# Patient Record
Sex: FEMALE | Race: OTHER | Employment: FULL TIME | URBAN - METROPOLITAN AREA
[De-identification: names, ages, dates, MRNs, and addresses within clinical notes are randomized per-mention and may not be internally consistent; named-entity substitution may affect disease eponyms.]

---
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
## 2019-01-18 ENCOUNTER — ANESTHESIA (OUTPATIENT)
Dept: LABOR AND DELIVERY | Age: 19
DRG: 540 | End: 2019-01-18
Payer: MEDICAID

## 2019-01-18 ENCOUNTER — HOSPITAL ENCOUNTER (INPATIENT)
Age: 19
LOS: 3 days | Discharge: HOME | DRG: 540 | End: 2019-01-21
Attending: OBSTETRICS & GYNECOLOGY | Admitting: OBSTETRICS & GYNECOLOGY
Payer: MEDICAID

## 2019-01-18 ENCOUNTER — ROUTINE PRENATAL (OUTPATIENT)
Dept: OBGYN CLINIC | Age: 19
DRG: 540 | End: 2019-01-18
Payer: MEDICAID

## 2019-01-18 ENCOUNTER — ANESTHESIA EVENT (OUTPATIENT)
Dept: LABOR AND DELIVERY | Age: 19
DRG: 540 | End: 2019-01-18
Payer: MEDICAID

## 2019-01-18 VITALS
WEIGHT: 242 LBS | BODY MASS INDEX: 40.27 KG/M2 | DIASTOLIC BLOOD PRESSURE: 83 MMHG | SYSTOLIC BLOOD PRESSURE: 128 MMHG | HEART RATE: 109 BPM

## 2019-01-18 DIAGNOSIS — R10.9 ACUTE POSTOPERATIVE PAIN OF ABDOMEN: Primary | ICD-10-CM

## 2019-01-18 DIAGNOSIS — O99.213 OBESITY AFFECTING PREGNANCY IN THIRD TRIMESTER: ICD-10-CM

## 2019-01-18 DIAGNOSIS — O41.03X0 OLIGOHYDRAMNIOS IN THIRD TRIMESTER, SINGLE OR UNSPECIFIED FETUS: Primary | ICD-10-CM

## 2019-01-18 DIAGNOSIS — O98.811 CHLAMYDIA INFECTION AFFECTING PREGNANCY, ANTEPARTUM, FIRST TRIMESTER: ICD-10-CM

## 2019-01-18 DIAGNOSIS — O28.8 NON-REACTIVE NST (NON-STRESS TEST): ICD-10-CM

## 2019-01-18 DIAGNOSIS — G89.18 ACUTE POSTOPERATIVE PAIN OF ABDOMEN: Primary | ICD-10-CM

## 2019-01-18 DIAGNOSIS — A74.9 CHLAMYDIA INFECTION AFFECTING PREGNANCY, ANTEPARTUM, FIRST TRIMESTER: ICD-10-CM

## 2019-01-18 DIAGNOSIS — O99.013 ANEMIA AFFECTING PREGNANCY IN THIRD TRIMESTER: ICD-10-CM

## 2019-01-18 DIAGNOSIS — Z78.9 NON-ENGLISH SPEAKING PATIENT: ICD-10-CM

## 2019-01-18 DIAGNOSIS — O36.8390 VARIABLE FETAL HEART RATE DECELERATIONS, ANTEPARTUM: ICD-10-CM

## 2019-01-18 DIAGNOSIS — Z3A.39 39 WEEKS GESTATION OF PREGNANCY: ICD-10-CM

## 2019-01-18 LAB
ABO/RH: NORMAL
ANTIBODY SCREEN: NORMAL
GLUCOSE URINE, POC: NEGATIVE
HCT VFR BLD CALC: 36.3 % (ref 34–48)
HEMOGLOBIN: 12.5 G/DL (ref 11.5–15.5)
MCH RBC QN AUTO: 32.5 PG (ref 26–35)
MCHC RBC AUTO-ENTMCNC: 34.4 % (ref 32–34.5)
MCV RBC AUTO: 94.3 FL (ref 80–99.9)
PDW BLD-RTO: 13.2 FL (ref 11.5–15)
PLATELET # BLD: 259 E9/L (ref 130–450)
PMV BLD AUTO: 9.5 FL (ref 7–12)
PROTEIN UA: NEGATIVE
RBC # BLD: 3.85 E12/L (ref 3.5–5.5)
WBC # BLD: 12 E9/L (ref 4.5–11.5)

## 2019-01-18 PROCEDURE — 1220000001 HC SEMI PRIVATE L&D R&B

## 2019-01-18 PROCEDURE — 86850 RBC ANTIBODY SCREEN: CPT

## 2019-01-18 PROCEDURE — G8427 DOCREV CUR MEDS BY ELIG CLIN: HCPCS | Performed by: OBSTETRICS & GYNECOLOGY

## 2019-01-18 PROCEDURE — 6360000002 HC RX W HCPCS: Performed by: OBSTETRICS & GYNECOLOGY

## 2019-01-18 PROCEDURE — 99211 OFF/OP EST MAY X REQ PHY/QHP: CPT | Performed by: OBSTETRICS & GYNECOLOGY

## 2019-01-18 PROCEDURE — 6370000000 HC RX 637 (ALT 250 FOR IP): Performed by: OBSTETRICS & GYNECOLOGY

## 2019-01-18 PROCEDURE — 86901 BLOOD TYPING SEROLOGIC RH(D): CPT

## 2019-01-18 PROCEDURE — G8482 FLU IMMUNIZE ORDER/ADMIN: HCPCS | Performed by: OBSTETRICS & GYNECOLOGY

## 2019-01-18 PROCEDURE — G8417 CALC BMI ABV UP PARAM F/U: HCPCS | Performed by: OBSTETRICS & GYNECOLOGY

## 2019-01-18 PROCEDURE — 81002 URINALYSIS NONAUTO W/O SCOPE: CPT | Performed by: OBSTETRICS & GYNECOLOGY

## 2019-01-18 PROCEDURE — 1036F TOBACCO NON-USER: CPT | Performed by: OBSTETRICS & GYNECOLOGY

## 2019-01-18 PROCEDURE — 86900 BLOOD TYPING SEROLOGIC ABO: CPT

## 2019-01-18 PROCEDURE — 76820 UMBILICAL ARTERY ECHO: CPT | Performed by: OBSTETRICS & GYNECOLOGY

## 2019-01-18 PROCEDURE — 36415 COLL VENOUS BLD VENIPUNCTURE: CPT

## 2019-01-18 PROCEDURE — 76805 OB US >/= 14 WKS SNGL FETUS: CPT | Performed by: OBSTETRICS & GYNECOLOGY

## 2019-01-18 PROCEDURE — 99213 OFFICE O/P EST LOW 20 MIN: CPT | Performed by: OBSTETRICS & GYNECOLOGY

## 2019-01-18 PROCEDURE — 2580000003 HC RX 258: Performed by: OBSTETRICS & GYNECOLOGY

## 2019-01-18 PROCEDURE — 76818 FETAL BIOPHYS PROFILE W/NST: CPT | Performed by: OBSTETRICS & GYNECOLOGY

## 2019-01-18 PROCEDURE — 85027 COMPLETE CBC AUTOMATED: CPT

## 2019-01-18 RX ORDER — SODIUM CHLORIDE, SODIUM LACTATE, POTASSIUM CHLORIDE, CALCIUM CHLORIDE 600; 310; 30; 20 MG/100ML; MG/100ML; MG/100ML; MG/100ML
INJECTION, SOLUTION INTRAVENOUS CONTINUOUS
Status: DISCONTINUED | OUTPATIENT
Start: 2019-01-18 | End: 2019-01-19 | Stop reason: SDUPTHER

## 2019-01-18 RX ADMIN — DINOPROSTONE 10 MG: 10 INSERT VAGINAL at 15:25

## 2019-01-18 RX ADMIN — BUTORPHANOL TARTRATE 1 MG: 2 INJECTION, SOLUTION INTRAMUSCULAR; INTRAVENOUS at 20:03

## 2019-01-18 RX ADMIN — BUTORPHANOL TARTRATE 1 MG: 2 INJECTION, SOLUTION INTRAMUSCULAR; INTRAVENOUS at 23:10

## 2019-01-18 RX ADMIN — BUTORPHANOL TARTRATE 1 MG: 2 INJECTION, SOLUTION INTRAMUSCULAR; INTRAVENOUS at 20:02

## 2019-01-18 RX ADMIN — SODIUM CHLORIDE, POTASSIUM CHLORIDE, SODIUM LACTATE AND CALCIUM CHLORIDE: 600; 310; 30; 20 INJECTION, SOLUTION INTRAVENOUS at 15:00

## 2019-01-18 RX ADMIN — BUTORPHANOL TARTRATE 1 MG: 2 INJECTION, SOLUTION INTRAMUSCULAR; INTRAVENOUS at 23:11

## 2019-01-18 ASSESSMENT — PAIN SCALES - GENERAL
PAINLEVEL_OUTOF10: 10

## 2019-01-19 VITALS — SYSTOLIC BLOOD PRESSURE: 128 MMHG | OXYGEN SATURATION: 100 % | DIASTOLIC BLOOD PRESSURE: 71 MMHG

## 2019-01-19 PROCEDURE — 2500000003 HC RX 250 WO HCPCS: Performed by: NURSE ANESTHETIST, CERTIFIED REGISTERED

## 2019-01-19 PROCEDURE — 3609079900 HC CESAREAN SECTION: Performed by: OBSTETRICS & GYNECOLOGY

## 2019-01-19 PROCEDURE — 7100000001 HC PACU RECOVERY - ADDTL 15 MIN: Performed by: OBSTETRICS & GYNECOLOGY

## 2019-01-19 PROCEDURE — 6360000002 HC RX W HCPCS

## 2019-01-19 PROCEDURE — 1220000000 HC SEMI PRIVATE OB R&B

## 2019-01-19 PROCEDURE — 6360000002 HC RX W HCPCS: Performed by: NURSE ANESTHETIST, CERTIFIED REGISTERED

## 2019-01-19 PROCEDURE — C1765 ADHESION BARRIER: HCPCS | Performed by: OBSTETRICS & GYNECOLOGY

## 2019-01-19 PROCEDURE — 51701 INSERT BLADDER CATHETER: CPT

## 2019-01-19 PROCEDURE — 2580000003 HC RX 258: Performed by: NURSE ANESTHETIST, CERTIFIED REGISTERED

## 2019-01-19 PROCEDURE — 2500000003 HC RX 250 WO HCPCS: Performed by: ANESTHESIOLOGY

## 2019-01-19 PROCEDURE — 2580000003 HC RX 258: Performed by: OBSTETRICS & GYNECOLOGY

## 2019-01-19 PROCEDURE — 6370000000 HC RX 637 (ALT 250 FOR IP)

## 2019-01-19 PROCEDURE — 3700000025 ANESTHESIA EPIDURAL BLOCK: Performed by: ANESTHESIOLOGY

## 2019-01-19 PROCEDURE — 4A1HXCZ MONITORING OF PRODUCTS OF CONCEPTION, CARDIAC RATE, EXTERNAL APPROACH: ICD-10-PCS | Performed by: OBSTETRICS & GYNECOLOGY

## 2019-01-19 PROCEDURE — 2709999900 HC NON-CHARGEABLE SUPPLY: Performed by: OBSTETRICS & GYNECOLOGY

## 2019-01-19 PROCEDURE — 7100000000 HC PACU RECOVERY - FIRST 15 MIN: Performed by: OBSTETRICS & GYNECOLOGY

## 2019-01-19 PROCEDURE — 6360000002 HC RX W HCPCS: Performed by: ANESTHESIOLOGY

## 2019-01-19 PROCEDURE — 2780000010 HC IMPLANT OTHER: Performed by: OBSTETRICS & GYNECOLOGY

## 2019-01-19 PROCEDURE — 94761 N-INVAS EAR/PLS OXIMETRY MLT: CPT

## 2019-01-19 PROCEDURE — 2500000003 HC RX 250 WO HCPCS: Performed by: OBSTETRICS & GYNECOLOGY

## 2019-01-19 RX ORDER — ONDANSETRON 2 MG/ML
4 INJECTION INTRAMUSCULAR; INTRAVENOUS EVERY 6 HOURS PRN
Status: DISCONTINUED | OUTPATIENT
Start: 2019-01-19 | End: 2019-01-19

## 2019-01-19 RX ORDER — DIPHENHYDRAMINE HYDROCHLORIDE 50 MG/ML
25 INJECTION INTRAMUSCULAR; INTRAVENOUS EVERY 6 HOURS PRN
Status: DISCONTINUED | OUTPATIENT
Start: 2019-01-19 | End: 2019-01-21 | Stop reason: HOSPADM

## 2019-01-19 RX ORDER — NALOXONE HYDROCHLORIDE 0.4 MG/ML
0.4 INJECTION, SOLUTION INTRAMUSCULAR; INTRAVENOUS; SUBCUTANEOUS PRN
Status: DISCONTINUED | OUTPATIENT
Start: 2019-01-19 | End: 2019-01-19

## 2019-01-19 RX ORDER — METHYLERGONOVINE MALEATE 0.2 MG/ML
200 INJECTION INTRAVENOUS PRN
Status: DISCONTINUED | OUTPATIENT
Start: 2019-01-19 | End: 2019-01-21 | Stop reason: HOSPADM

## 2019-01-19 RX ORDER — KETOROLAC TROMETHAMINE 30 MG/ML
15 INJECTION, SOLUTION INTRAMUSCULAR; INTRAVENOUS EVERY 6 HOURS PRN
Status: DISPENSED | OUTPATIENT
Start: 2019-01-19 | End: 2019-01-20

## 2019-01-19 RX ORDER — BETAMETHASONE SODIUM PHOSPHATE AND BETAMETHASONE ACETATE 3; 3 MG/ML; MG/ML
INJECTION, SUSPENSION INTRA-ARTICULAR; INTRALESIONAL; INTRAMUSCULAR; SOFT TISSUE
Status: DISCONTINUED
Start: 2019-01-19 | End: 2019-01-19 | Stop reason: WASHOUT

## 2019-01-19 RX ORDER — ONDANSETRON 2 MG/ML
4 INJECTION INTRAMUSCULAR; INTRAVENOUS EVERY 6 HOURS PRN
Status: DISCONTINUED | OUTPATIENT
Start: 2019-01-19 | End: 2019-01-21 | Stop reason: HOSPADM

## 2019-01-19 RX ORDER — DOCUSATE SODIUM 100 MG/1
100 CAPSULE, LIQUID FILLED ORAL 2 TIMES DAILY
Status: DISCONTINUED | OUTPATIENT
Start: 2019-01-19 | End: 2019-01-21 | Stop reason: HOSPADM

## 2019-01-19 RX ORDER — LIDOCAINE HYDROCHLORIDE 10 MG/ML
INJECTION, SOLUTION INFILTRATION; PERINEURAL
Status: DISPENSED
Start: 2019-01-19 | End: 2019-01-19

## 2019-01-19 RX ORDER — NALBUPHINE HCL 10 MG/ML
5 AMPUL (ML) INJECTION EVERY 4 HOURS PRN
Status: DISCONTINUED | OUTPATIENT
Start: 2019-01-19 | End: 2019-01-19

## 2019-01-19 RX ORDER — TRISODIUM CITRATE DIHYDRATE AND CITRIC ACID MONOHYDRATE 500; 334 MG/5ML; MG/5ML
SOLUTION ORAL
Status: COMPLETED
Start: 2019-01-19 | End: 2019-01-19

## 2019-01-19 RX ORDER — ACETAMINOPHEN 325 MG/1
650 TABLET ORAL EVERY 4 HOURS PRN
Status: DISCONTINUED | OUTPATIENT
Start: 2019-01-19 | End: 2019-01-21 | Stop reason: HOSPADM

## 2019-01-19 RX ORDER — LIDOCAINE HYDROCHLORIDE AND EPINEPHRINE 20; 5 MG/ML; UG/ML
INJECTION, SOLUTION EPIDURAL; INFILTRATION; INTRACAUDAL; PERINEURAL PRN
Status: DISCONTINUED | OUTPATIENT
Start: 2019-01-19 | End: 2019-01-19 | Stop reason: SDUPTHER

## 2019-01-19 RX ORDER — IBUPROFEN 800 MG/1
800 TABLET ORAL EVERY 8 HOURS
Status: DISCONTINUED | OUTPATIENT
Start: 2019-01-20 | End: 2019-01-21 | Stop reason: HOSPADM

## 2019-01-19 RX ORDER — OXYCODONE HYDROCHLORIDE AND ACETAMINOPHEN 5; 325 MG/1; MG/1
2 TABLET ORAL EVERY 4 HOURS PRN
Status: DISCONTINUED | OUTPATIENT
Start: 2019-01-19 | End: 2019-01-21 | Stop reason: HOSPADM

## 2019-01-19 RX ORDER — SODIUM CHLORIDE, SODIUM LACTATE, POTASSIUM CHLORIDE, CALCIUM CHLORIDE 600; 310; 30; 20 MG/100ML; MG/100ML; MG/100ML; MG/100ML
INJECTION, SOLUTION INTRAVENOUS CONTINUOUS PRN
Status: DISCONTINUED | OUTPATIENT
Start: 2019-01-19 | End: 2019-01-19 | Stop reason: SDUPTHER

## 2019-01-19 RX ORDER — ACETAMINOPHEN 650 MG
TABLET, EXTENDED RELEASE ORAL
Status: DISPENSED
Start: 2019-01-19 | End: 2019-01-19

## 2019-01-19 RX ORDER — CLINDAMYCIN PHOSPHATE 900 MG/50ML
900 INJECTION INTRAVENOUS EVERY 8 HOURS
Status: DISCONTINUED | OUTPATIENT
Start: 2019-01-19 | End: 2019-01-20

## 2019-01-19 RX ORDER — SODIUM CHLORIDE, SODIUM LACTATE, POTASSIUM CHLORIDE, CALCIUM CHLORIDE 600; 310; 30; 20 MG/100ML; MG/100ML; MG/100ML; MG/100ML
INJECTION, SOLUTION INTRAVENOUS CONTINUOUS
Status: DISCONTINUED | OUTPATIENT
Start: 2019-01-19 | End: 2019-01-21 | Stop reason: HOSPADM

## 2019-01-19 RX ORDER — LANOLIN 100 %
OINTMENT (GRAM) TOPICAL
Status: DISCONTINUED | OUTPATIENT
Start: 2019-01-19 | End: 2019-01-21 | Stop reason: HOSPADM

## 2019-01-19 RX ORDER — SODIUM CHLORIDE 0.9 % (FLUSH) 0.9 %
10 SYRINGE (ML) INJECTION EVERY 12 HOURS SCHEDULED
Status: DISCONTINUED | OUTPATIENT
Start: 2019-01-19 | End: 2019-01-21 | Stop reason: HOSPADM

## 2019-01-19 RX ORDER — OXYCODONE HYDROCHLORIDE AND ACETAMINOPHEN 5; 325 MG/1; MG/1
1 TABLET ORAL EVERY 4 HOURS PRN
Status: DISPENSED | OUTPATIENT
Start: 2019-01-19 | End: 2019-01-20

## 2019-01-19 RX ORDER — FENTANYL CITRATE 50 UG/ML
INJECTION, SOLUTION INTRAMUSCULAR; INTRAVENOUS PRN
Status: DISCONTINUED | OUTPATIENT
Start: 2019-01-19 | End: 2019-01-19

## 2019-01-19 RX ORDER — METHYLERGONOVINE MALEATE 0.2 MG/ML
INJECTION INTRAVENOUS PRN
Status: DISCONTINUED | OUTPATIENT
Start: 2019-01-19 | End: 2019-01-19

## 2019-01-19 RX ORDER — SODIUM CHLORIDE 0.9 % (FLUSH) 0.9 %
10 SYRINGE (ML) INJECTION PRN
Status: DISCONTINUED | OUTPATIENT
Start: 2019-01-19 | End: 2019-01-19 | Stop reason: SDUPTHER

## 2019-01-19 RX ORDER — DIPHENHYDRAMINE HYDROCHLORIDE 50 MG/ML
25 INJECTION INTRAMUSCULAR; INTRAVENOUS EVERY 6 HOURS PRN
Status: ACTIVE | OUTPATIENT
Start: 2019-01-19 | End: 2019-01-20

## 2019-01-19 RX ORDER — CEFAZOLIN SODIUM 2 G/50ML
SOLUTION INTRAVENOUS
Status: COMPLETED
Start: 2019-01-19 | End: 2019-01-19

## 2019-01-19 RX ORDER — LIDOCAINE HYDROCHLORIDE 10 MG/ML
INJECTION, SOLUTION EPIDURAL; INFILTRATION; INTRACAUDAL; PERINEURAL
Status: DISPENSED
Start: 2019-01-19 | End: 2019-01-20

## 2019-01-19 RX ORDER — BUPIVACAINE HYDROCHLORIDE 7.5 MG/ML
INJECTION, SOLUTION INTRASPINAL
Status: DISPENSED
Start: 2019-01-19 | End: 2019-01-20

## 2019-01-19 RX ORDER — SODIUM CHLORIDE 0.9 % (FLUSH) 0.9 %
10 SYRINGE (ML) INJECTION EVERY 12 HOURS SCHEDULED
Status: DISCONTINUED | OUTPATIENT
Start: 2019-01-19 | End: 2019-01-19 | Stop reason: SDUPTHER

## 2019-01-19 RX ORDER — TRISODIUM CITRATE DIHYDRATE AND CITRIC ACID MONOHYDRATE 500; 334 MG/5ML; MG/5ML
30 SOLUTION ORAL ONCE
Status: COMPLETED | OUTPATIENT
Start: 2019-01-19 | End: 2019-01-19

## 2019-01-19 RX ORDER — PRENATAL WITH FERROUS FUM AND FOLIC ACID 3080; 920; 120; 400; 22; 1.84; 3; 20; 10; 1; 12; 200; 27; 25; 2 [IU]/1; [IU]/1; MG/1; [IU]/1; MG/1; MG/1; MG/1; MG/1; MG/1; MG/1; UG/1; MG/1; MG/1; MG/1; MG/1
1 TABLET ORAL DAILY
Status: DISCONTINUED | OUTPATIENT
Start: 2019-01-19 | End: 2019-01-21 | Stop reason: HOSPADM

## 2019-01-19 RX ORDER — OXYCODONE HYDROCHLORIDE AND ACETAMINOPHEN 5; 325 MG/1; MG/1
1 TABLET ORAL EVERY 4 HOURS PRN
Status: DISCONTINUED | OUTPATIENT
Start: 2019-01-19 | End: 2019-01-21 | Stop reason: HOSPADM

## 2019-01-19 RX ORDER — ONDANSETRON 2 MG/ML
INJECTION INTRAMUSCULAR; INTRAVENOUS PRN
Status: DISCONTINUED | OUTPATIENT
Start: 2019-01-19 | End: 2019-01-19

## 2019-01-19 RX ORDER — ONDANSETRON 2 MG/ML
4 INJECTION INTRAMUSCULAR; INTRAVENOUS EVERY 6 HOURS PRN
Status: ACTIVE | OUTPATIENT
Start: 2019-01-19 | End: 2019-01-20

## 2019-01-19 RX ORDER — MORPHINE SULFATE 10 MG/ML
INJECTION, SOLUTION INTRAMUSCULAR; INTRAVENOUS PRN
Status: DISCONTINUED | OUTPATIENT
Start: 2019-01-19 | End: 2019-01-19

## 2019-01-19 RX ORDER — SODIUM CHLORIDE, SODIUM LACTATE, POTASSIUM CHLORIDE, CALCIUM CHLORIDE 600; 310; 30; 20 MG/100ML; MG/100ML; MG/100ML; MG/100ML
INJECTION, SOLUTION INTRAVENOUS CONTINUOUS
Status: DISCONTINUED | OUTPATIENT
Start: 2019-01-19 | End: 2019-01-19 | Stop reason: SDUPTHER

## 2019-01-19 RX ADMIN — MORPHINE SULFATE 2 MG: 10 INJECTION INTRAVENOUS at 15:53

## 2019-01-19 RX ADMIN — Medication 5 ML: at 01:27

## 2019-01-19 RX ADMIN — SODIUM CHLORIDE, POTASSIUM CHLORIDE, SODIUM LACTATE AND CALCIUM CHLORIDE: 600; 310; 30; 20 INJECTION, SOLUTION INTRAVENOUS at 01:36

## 2019-01-19 RX ADMIN — SODIUM CHLORIDE, POTASSIUM CHLORIDE, SODIUM LACTATE AND CALCIUM CHLORIDE: 600; 310; 30; 20 INJECTION, SOLUTION INTRAVENOUS at 15:21

## 2019-01-19 RX ADMIN — SODIUM BICARBONATE 2 MEQ: 84 INJECTION, SOLUTION INTRAVENOUS at 15:19

## 2019-01-19 RX ADMIN — SODIUM CHLORIDE, POTASSIUM CHLORIDE, SODIUM LACTATE AND CALCIUM CHLORIDE: 600; 310; 30; 20 INJECTION, SOLUTION INTRAVENOUS at 14:30

## 2019-01-19 RX ADMIN — CLINDAMYCIN PHOSPHATE 900 MG: 900 INJECTION, SOLUTION INTRAVENOUS at 01:47

## 2019-01-19 RX ADMIN — FENTANYL CITRATE 100 MCG: 50 INJECTION, SOLUTION INTRAMUSCULAR; INTRAVENOUS at 15:16

## 2019-01-19 RX ADMIN — CEFAZOLIN SODIUM 2 G: 2 SOLUTION INTRAVENOUS at 14:50

## 2019-01-19 RX ADMIN — KETOROLAC TROMETHAMINE 15 MG: 30 INJECTION, SOLUTION INTRAMUSCULAR; INTRAVENOUS at 18:05

## 2019-01-19 RX ADMIN — SODIUM CHLORIDE, POTASSIUM CHLORIDE, SODIUM LACTATE AND CALCIUM CHLORIDE: 600; 310; 30; 20 INJECTION, SOLUTION INTRAVENOUS at 08:30

## 2019-01-19 RX ADMIN — Medication 15 ML/HR: at 01:39

## 2019-01-19 RX ADMIN — TRISODIUM CITRATE DIHYDRATE AND CITRIC ACID MONOHYDRATE 30 ML: 500; 334 SOLUTION ORAL at 14:49

## 2019-01-19 RX ADMIN — LIDOCAINE HYDROCHLORIDE,EPINEPHRINE BITARTRATE 10 ML: 20; .005 INJECTION, SOLUTION EPIDURAL; INFILTRATION; INTRACAUDAL; PERINEURAL at 15:17

## 2019-01-19 RX ADMIN — SODIUM CITRATE AND CITRIC ACID MONOHYDRATE 30 ML: 500; 334 SOLUTION ORAL at 14:49

## 2019-01-19 RX ADMIN — LIDOCAINE HYDROCHLORIDE,EPINEPHRINE BITARTRATE 10 ML: 20; .005 INJECTION, SOLUTION EPIDURAL; INFILTRATION; INTRACAUDAL; PERINEURAL at 15:25

## 2019-01-19 RX ADMIN — Medication 999 ML/HR: at 15:48

## 2019-01-19 RX ADMIN — ONDANSETRON HYDROCHLORIDE 4 MG: 2 INJECTION, SOLUTION INTRAMUSCULAR; INTRAVENOUS at 15:48

## 2019-01-19 RX ADMIN — Medication 1 MILLI-UNITS/MIN: at 12:45

## 2019-01-19 RX ADMIN — SODIUM CHLORIDE, POTASSIUM CHLORIDE, SODIUM LACTATE AND CALCIUM CHLORIDE: 600; 310; 30; 20 INJECTION, SOLUTION INTRAVENOUS at 03:50

## 2019-01-19 RX ADMIN — CLINDAMYCIN PHOSPHATE 900 MG: 900 INJECTION, SOLUTION INTRAVENOUS at 09:35

## 2019-01-19 RX ADMIN — METHYLERGONOVINE MALEATE 200 MCG: 0.2 INJECTION INTRAVENOUS at 16:09

## 2019-01-19 RX ADMIN — Medication 15 ML/HR: at 10:01

## 2019-01-19 RX ADMIN — Medication 2 ML: at 01:38

## 2019-01-19 ASSESSMENT — PULMONARY FUNCTION TESTS
PIF_VALUE: 0

## 2019-01-19 ASSESSMENT — PAIN SCALES - GENERAL: PAINLEVEL_OUTOF10: 9

## 2019-01-20 VITALS
BODY MASS INDEX: 40.32 KG/M2 | DIASTOLIC BLOOD PRESSURE: 86 MMHG | TEMPERATURE: 98.2 F | WEIGHT: 242 LBS | OXYGEN SATURATION: 98 % | HEART RATE: 120 BPM | RESPIRATION RATE: 18 BRPM | HEIGHT: 65 IN | SYSTOLIC BLOOD PRESSURE: 137 MMHG

## 2019-01-20 LAB
HCT VFR BLD CALC: 26.6 % (ref 34–48)
HEMOGLOBIN: 8.7 G/DL (ref 11.5–15.5)
MCH RBC QN AUTO: 31.4 PG (ref 26–35)
MCHC RBC AUTO-ENTMCNC: 32.7 % (ref 32–34.5)
MCV RBC AUTO: 96 FL (ref 80–99.9)
PDW BLD-RTO: 13.2 FL (ref 11.5–15)
PLATELET # BLD: 212 E9/L (ref 130–450)
PMV BLD AUTO: 9.3 FL (ref 7–12)
RBC # BLD: 2.77 E12/L (ref 3.5–5.5)
WBC # BLD: 12 E9/L (ref 4.5–11.5)

## 2019-01-20 PROCEDURE — 36415 COLL VENOUS BLD VENIPUNCTURE: CPT

## 2019-01-20 PROCEDURE — 6370000000 HC RX 637 (ALT 250 FOR IP): Performed by: OBSTETRICS & GYNECOLOGY

## 2019-01-20 PROCEDURE — 6360000002 HC RX W HCPCS: Performed by: ANESTHESIOLOGY

## 2019-01-20 PROCEDURE — 2580000003 HC RX 258: Performed by: OBSTETRICS & GYNECOLOGY

## 2019-01-20 PROCEDURE — 1220000000 HC SEMI PRIVATE OB R&B

## 2019-01-20 PROCEDURE — 85027 COMPLETE CBC AUTOMATED: CPT

## 2019-01-20 RX ADMIN — Medication 10 ML: at 08:35

## 2019-01-20 RX ADMIN — KETOROLAC TROMETHAMINE 15 MG: 30 INJECTION, SOLUTION INTRAMUSCULAR; INTRAVENOUS at 16:08

## 2019-01-20 RX ADMIN — IBUPROFEN 800 MG: 800 TABLET ORAL at 21:23

## 2019-01-20 RX ADMIN — DOCUSATE SODIUM 100 MG: 100 CAPSULE, LIQUID FILLED ORAL at 08:35

## 2019-01-20 RX ADMIN — OXYCODONE AND ACETAMINOPHEN 2 TABLET: 5; 325 TABLET ORAL at 20:27

## 2019-01-20 RX ADMIN — Medication 10 ML: at 16:08

## 2019-01-20 RX ADMIN — Medication: at 06:27

## 2019-01-20 RX ADMIN — Medication 1 TABLET: at 08:35

## 2019-01-20 RX ADMIN — KETOROLAC TROMETHAMINE 15 MG: 30 INJECTION, SOLUTION INTRAMUSCULAR; INTRAVENOUS at 08:35

## 2019-01-20 RX ADMIN — DOCUSATE SODIUM 100 MG: 100 CAPSULE, LIQUID FILLED ORAL at 21:23

## 2019-01-20 RX ADMIN — KETOROLAC TROMETHAMINE 15 MG: 30 INJECTION, SOLUTION INTRAMUSCULAR; INTRAVENOUS at 00:26

## 2019-01-20 RX ADMIN — SODIUM CHLORIDE, POTASSIUM CHLORIDE, SODIUM LACTATE AND CALCIUM CHLORIDE: 600; 310; 30; 20 INJECTION, SOLUTION INTRAVENOUS at 00:27

## 2019-01-20 RX ADMIN — OXYCODONE AND ACETAMINOPHEN 1 TABLET: 5; 325 TABLET ORAL at 06:27

## 2019-01-20 ASSESSMENT — PAIN SCALES - GENERAL
PAINLEVEL_OUTOF10: 10
PAINLEVEL_OUTOF10: 1
PAINLEVEL_OUTOF10: 5
PAINLEVEL_OUTOF10: 6
PAINLEVEL_OUTOF10: 4
PAINLEVEL_OUTOF10: 5

## 2019-01-21 PROCEDURE — 6370000000 HC RX 637 (ALT 250 FOR IP): Performed by: OBSTETRICS & GYNECOLOGY

## 2019-01-21 RX ORDER — PSEUDOEPHEDRINE HCL 30 MG
100 TABLET ORAL 2 TIMES DAILY
Qty: 60 CAPSULE | Refills: 3 | Status: SHIPPED | OUTPATIENT
Start: 2019-01-21 | End: 2021-10-29

## 2019-01-21 RX ORDER — OXYCODONE HYDROCHLORIDE AND ACETAMINOPHEN 5; 325 MG/1; MG/1
1 TABLET ORAL EVERY 6 HOURS PRN
Qty: 25 TABLET | Refills: 0 | Status: SHIPPED | OUTPATIENT
Start: 2019-01-21 | End: 2019-01-28

## 2019-01-21 RX ORDER — LANOLIN 100 %
OINTMENT (GRAM) TOPICAL
Qty: 1 TUBE | Refills: 3 | Status: SHIPPED | OUTPATIENT
Start: 2019-01-21 | End: 2022-06-15

## 2019-01-21 RX ORDER — FERROUS SULFATE 325(65) MG
325 TABLET ORAL 2 TIMES DAILY WITH MEALS
Qty: 60 TABLET | Refills: 3 | Status: SHIPPED | OUTPATIENT
Start: 2019-01-21 | End: 2021-10-29

## 2019-01-21 RX ORDER — IBUPROFEN 800 MG/1
800 TABLET ORAL EVERY 8 HOURS
Qty: 60 TABLET | Refills: 3 | Status: SHIPPED | OUTPATIENT
Start: 2019-01-21 | End: 2020-11-19 | Stop reason: ALTCHOICE

## 2019-01-21 RX ADMIN — OXYCODONE AND ACETAMINOPHEN 2 TABLET: 5; 325 TABLET ORAL at 14:38

## 2019-01-21 RX ADMIN — OXYCODONE AND ACETAMINOPHEN 2 TABLET: 5; 325 TABLET ORAL at 20:16

## 2019-01-21 RX ADMIN — DOCUSATE SODIUM 100 MG: 100 CAPSULE, LIQUID FILLED ORAL at 20:16

## 2019-01-21 RX ADMIN — Medication 1 TABLET: at 09:27

## 2019-01-21 RX ADMIN — OXYCODONE AND ACETAMINOPHEN 2 TABLET: 5; 325 TABLET ORAL at 01:58

## 2019-01-21 RX ADMIN — DOCUSATE SODIUM 100 MG: 100 CAPSULE, LIQUID FILLED ORAL at 09:26

## 2019-01-21 RX ADMIN — OXYCODONE AND ACETAMINOPHEN 2 TABLET: 5; 325 TABLET ORAL at 09:27

## 2019-01-21 RX ADMIN — IBUPROFEN 800 MG: 800 TABLET ORAL at 05:44

## 2019-01-21 RX ADMIN — IBUPROFEN 800 MG: 800 TABLET ORAL at 14:38

## 2019-01-21 ASSESSMENT — PAIN SCALES - GENERAL
PAINLEVEL_OUTOF10: 8
PAINLEVEL_OUTOF10: 8
PAINLEVEL_OUTOF10: 2
PAINLEVEL_OUTOF10: 8
PAINLEVEL_OUTOF10: 8

## 2019-01-21 ASSESSMENT — PAIN DESCRIPTION - RADICULAR PAIN: RADICULAR_PAIN: ABSENT

## 2019-02-13 ENCOUNTER — APPOINTMENT (OUTPATIENT)
Dept: GENERAL RADIOLOGY | Age: 19
End: 2019-02-13
Payer: MEDICAID

## 2019-02-13 ENCOUNTER — HOSPITAL ENCOUNTER (EMERGENCY)
Age: 19
Discharge: HOME | End: 2019-02-14
Attending: EMERGENCY MEDICINE
Payer: MEDICAID

## 2019-02-13 ENCOUNTER — APPOINTMENT (OUTPATIENT)
Dept: ULTRASOUND IMAGING | Age: 19
End: 2019-02-13
Payer: MEDICAID

## 2019-02-13 ENCOUNTER — APPOINTMENT (OUTPATIENT)
Dept: CT IMAGING | Age: 19
End: 2019-02-13
Payer: MEDICAID

## 2019-02-13 VITALS
RESPIRATION RATE: 16 BRPM | OXYGEN SATURATION: 97 % | DIASTOLIC BLOOD PRESSURE: 64 MMHG | BODY MASS INDEX: 36.65 KG/M2 | WEIGHT: 220 LBS | SYSTOLIC BLOOD PRESSURE: 110 MMHG | HEIGHT: 65 IN | TEMPERATURE: 98.4 F | HEART RATE: 71 BPM

## 2019-02-13 DIAGNOSIS — K66.8 PNEUMOPERITONEUM: Primary | ICD-10-CM

## 2019-02-13 DIAGNOSIS — G89.18 POST-OPERATIVE PAIN: ICD-10-CM

## 2019-02-13 LAB
ALBUMIN SERPL-MCNC: 4 G/DL (ref 3.5–5.2)
ALP BLD-CCNC: 93 U/L (ref 35–104)
ALT SERPL-CCNC: 40 U/L (ref 0–32)
ANION GAP SERPL CALCULATED.3IONS-SCNC: 9 MMOL/L (ref 7–16)
AST SERPL-CCNC: 47 U/L (ref 0–31)
BACTERIA: ABNORMAL /HPF
BASOPHILS ABSOLUTE: 0.04 E9/L (ref 0–0.2)
BASOPHILS RELATIVE PERCENT: 0.5 % (ref 0–2)
BILIRUB SERPL-MCNC: 0.2 MG/DL (ref 0–1.2)
BILIRUBIN URINE: NEGATIVE
BLOOD, URINE: NEGATIVE
BUN BLDV-MCNC: 12 MG/DL (ref 6–20)
CALCIUM SERPL-MCNC: 8.9 MG/DL (ref 8.6–10.2)
CHLORIDE BLD-SCNC: 102 MMOL/L (ref 98–107)
CLARITY: CLEAR
CO2: 28 MMOL/L (ref 22–29)
COLOR: YELLOW
CREAT SERPL-MCNC: 1 MG/DL (ref 0.5–1)
D DIMER: 429 NG/ML DDU
EOSINOPHILS ABSOLUTE: 0.23 E9/L (ref 0.05–0.5)
EOSINOPHILS RELATIVE PERCENT: 2.7 % (ref 0–6)
EPITHELIAL CELLS, UA: ABNORMAL /HPF
GFR AFRICAN AMERICAN: >60
GFR NON-AFRICAN AMERICAN: >60 ML/MIN/1.73
GLUCOSE BLD-MCNC: 90 MG/DL (ref 74–99)
GLUCOSE URINE: NEGATIVE MG/DL
HCG, URINE, POC: NEGATIVE
HCT VFR BLD CALC: 34.1 % (ref 34–48)
HEMOGLOBIN: 10.9 G/DL (ref 11.5–15.5)
IMMATURE GRANULOCYTES #: 0.05 E9/L
IMMATURE GRANULOCYTES %: 0.6 % (ref 0–5)
KETONES, URINE: NEGATIVE MG/DL
LACTIC ACID: 1.4 MMOL/L (ref 0.5–2.2)
LEUKOCYTE ESTERASE, URINE: ABNORMAL
LIPASE: 27 U/L (ref 13–60)
LYMPHOCYTES ABSOLUTE: 2.67 E9/L (ref 1.5–4)
LYMPHOCYTES RELATIVE PERCENT: 31.2 % (ref 20–42)
Lab: NORMAL
MCH RBC QN AUTO: 30.5 PG (ref 26–35)
MCHC RBC AUTO-ENTMCNC: 32 % (ref 32–34.5)
MCV RBC AUTO: 95.5 FL (ref 80–99.9)
MONOCYTES ABSOLUTE: 0.49 E9/L (ref 0.1–0.95)
MONOCYTES RELATIVE PERCENT: 5.7 % (ref 2–12)
NEGATIVE QC PASS/FAIL: NORMAL
NEUTROPHILS ABSOLUTE: 5.07 E9/L (ref 1.8–7.3)
NEUTROPHILS RELATIVE PERCENT: 59.3 % (ref 43–80)
NITRITE, URINE: NEGATIVE
PDW BLD-RTO: 12.5 FL (ref 11.5–15)
PH UA: 6 (ref 5–9)
PLATELET # BLD: 274 E9/L (ref 130–450)
PMV BLD AUTO: 8.8 FL (ref 7–12)
POSITIVE QC PASS/FAIL: NORMAL
POTASSIUM REFLEX MAGNESIUM: 3.9 MMOL/L (ref 3.5–5)
PROTEIN UA: NEGATIVE MG/DL
RBC # BLD: 3.57 E12/L (ref 3.5–5.5)
RBC UA: ABNORMAL /HPF (ref 0–2)
SODIUM BLD-SCNC: 139 MMOL/L (ref 132–146)
SPECIFIC GRAVITY UA: 1.02 (ref 1–1.03)
TOTAL PROTEIN: 6.7 G/DL (ref 6.4–8.3)
UROBILINOGEN, URINE: 0.2 E.U./DL
WBC # BLD: 8.6 E9/L (ref 4.5–11.5)
WBC UA: ABNORMAL /HPF (ref 0–5)

## 2019-02-13 PROCEDURE — 83690 ASSAY OF LIPASE: CPT

## 2019-02-13 PROCEDURE — 81001 URINALYSIS AUTO W/SCOPE: CPT

## 2019-02-13 PROCEDURE — 74176 CT ABD & PELVIS W/O CONTRAST: CPT

## 2019-02-13 PROCEDURE — 76705 ECHO EXAM OF ABDOMEN: CPT

## 2019-02-13 PROCEDURE — 2500000003 HC RX 250 WO HCPCS: Performed by: EMERGENCY MEDICINE

## 2019-02-13 PROCEDURE — 85378 FIBRIN DEGRADE SEMIQUANT: CPT

## 2019-02-13 PROCEDURE — 80053 COMPREHEN METABOLIC PANEL: CPT

## 2019-02-13 PROCEDURE — 74177 CT ABD & PELVIS W/CONTRAST: CPT

## 2019-02-13 PROCEDURE — 99284 EMERGENCY DEPT VISIT MOD MDM: CPT

## 2019-02-13 PROCEDURE — 85025 COMPLETE CBC W/AUTO DIFF WBC: CPT

## 2019-02-13 PROCEDURE — 6360000004 HC RX CONTRAST MEDICATION: Performed by: RADIOLOGY

## 2019-02-13 PROCEDURE — 96374 THER/PROPH/DIAG INJ IV PUSH: CPT

## 2019-02-13 PROCEDURE — 71045 X-RAY EXAM CHEST 1 VIEW: CPT

## 2019-02-13 PROCEDURE — 83605 ASSAY OF LACTIC ACID: CPT

## 2019-02-13 PROCEDURE — 36415 COLL VENOUS BLD VENIPUNCTURE: CPT

## 2019-02-13 PROCEDURE — 71275 CT ANGIOGRAPHY CHEST: CPT

## 2019-02-13 PROCEDURE — 6370000000 HC RX 637 (ALT 250 FOR IP): Performed by: EMERGENCY MEDICINE

## 2019-02-13 RX ADMIN — FAMOTIDINE 20 MG: 10 INJECTION, SOLUTION INTRAVENOUS at 20:20

## 2019-02-13 RX ADMIN — LIDOCAINE HYDROCHLORIDE: 20 SOLUTION ORAL; TOPICAL at 20:19

## 2019-02-13 RX ADMIN — IOPAMIDOL 110 ML: 755 INJECTION, SOLUTION INTRAVENOUS at 21:36

## 2019-02-13 ASSESSMENT — ENCOUNTER SYMPTOMS
RHINORRHEA: 0
ABDOMINAL PAIN: 1
COUGH: 0
BACK PAIN: 0
COLOR CHANGE: 0
SHORTNESS OF BREATH: 0
VOMITING: 0
DIARRHEA: 0
CONSTIPATION: 0
BLOOD IN STOOL: 0
NAUSEA: 0
SORE THROAT: 0

## 2019-02-13 ASSESSMENT — PAIN DESCRIPTION - ORIENTATION: ORIENTATION: LOWER

## 2019-02-13 ASSESSMENT — PAIN DESCRIPTION - LOCATION: LOCATION: ABDOMEN;BACK

## 2019-02-13 ASSESSMENT — PAIN DESCRIPTION - FREQUENCY: FREQUENCY: CONTINUOUS

## 2019-02-13 ASSESSMENT — PAIN SCALES - GENERAL: PAINLEVEL_OUTOF10: 5

## 2019-02-13 ASSESSMENT — PAIN DESCRIPTION - PAIN TYPE: TYPE: ACUTE PAIN

## 2019-02-14 PROCEDURE — 6360000004 HC RX CONTRAST MEDICATION: Performed by: RADIOLOGY

## 2019-02-14 RX ADMIN — IOHEXOL 50 ML: 240 INJECTION, SOLUTION INTRATHECAL; INTRAVASCULAR; INTRAVENOUS; ORAL at 01:34

## 2019-02-20 LAB
EKG ATRIAL RATE: 67 BPM
EKG P AXIS: 23 DEGREES
EKG P-R INTERVAL: 170 MS
EKG Q-T INTERVAL: 410 MS
EKG QRS DURATION: 84 MS
EKG QTC CALCULATION (BAZETT): 433 MS
EKG R AXIS: 24 DEGREES
EKG T AXIS: 26 DEGREES
EKG VENTRICULAR RATE: 67 BPM

## 2019-05-07 ENCOUNTER — HOSPITAL ENCOUNTER (EMERGENCY)
Age: 19
Discharge: HOME | End: 2019-05-07
Attending: EMERGENCY MEDICINE
Payer: MEDICAID

## 2019-05-07 ENCOUNTER — APPOINTMENT (OUTPATIENT)
Dept: CT IMAGING | Age: 19
End: 2019-05-07
Payer: MEDICAID

## 2019-05-07 ENCOUNTER — APPOINTMENT (OUTPATIENT)
Dept: ULTRASOUND IMAGING | Age: 19
End: 2019-05-07
Payer: MEDICAID

## 2019-05-07 VITALS
OXYGEN SATURATION: 99 % | TEMPERATURE: 98.2 F | HEIGHT: 65 IN | SYSTOLIC BLOOD PRESSURE: 119 MMHG | BODY MASS INDEX: 36.65 KG/M2 | RESPIRATION RATE: 16 BRPM | HEART RATE: 84 BPM | DIASTOLIC BLOOD PRESSURE: 63 MMHG | WEIGHT: 220 LBS

## 2019-05-07 DIAGNOSIS — R11.2 NON-INTRACTABLE VOMITING WITH NAUSEA, UNSPECIFIED VOMITING TYPE: ICD-10-CM

## 2019-05-07 DIAGNOSIS — R10.84 GENERALIZED ABDOMINAL PAIN: Primary | ICD-10-CM

## 2019-05-07 LAB
ALBUMIN SERPL-MCNC: 4.3 G/DL (ref 3.5–5.2)
ALP BLD-CCNC: 88 U/L (ref 35–104)
ALT SERPL-CCNC: 20 U/L (ref 0–32)
ANION GAP SERPL CALCULATED.3IONS-SCNC: 11 MMOL/L (ref 7–16)
AST SERPL-CCNC: 17 U/L (ref 0–31)
BACTERIA: NORMAL /HPF
BASOPHILS ABSOLUTE: 0.04 E9/L (ref 0–0.2)
BASOPHILS RELATIVE PERCENT: 0.5 % (ref 0–2)
BILIRUB SERPL-MCNC: <0.2 MG/DL (ref 0–1.2)
BILIRUBIN URINE: NEGATIVE
BLOOD, URINE: NEGATIVE
BUN BLDV-MCNC: 10 MG/DL (ref 6–20)
CALCIUM SERPL-MCNC: 8.7 MG/DL (ref 8.6–10.2)
CHLORIDE BLD-SCNC: 100 MMOL/L (ref 98–107)
CLARITY: CLEAR
CO2: 26 MMOL/L (ref 22–29)
COLOR: YELLOW
CREAT SERPL-MCNC: 0.7 MG/DL (ref 0.5–1)
EOSINOPHILS ABSOLUTE: 0.16 E9/L (ref 0.05–0.5)
EOSINOPHILS RELATIVE PERCENT: 2.1 % (ref 0–6)
GFR AFRICAN AMERICAN: >60
GFR NON-AFRICAN AMERICAN: >60 ML/MIN/1.73
GLUCOSE BLD-MCNC: 95 MG/DL (ref 74–99)
GLUCOSE URINE: NEGATIVE MG/DL
HCG(URINE) PREGNANCY TEST: NEGATIVE
HCT VFR BLD CALC: 36.9 % (ref 34–48)
HEMOGLOBIN: 12 G/DL (ref 11.5–15.5)
IMMATURE GRANULOCYTES #: 0.04 E9/L
IMMATURE GRANULOCYTES %: 0.5 % (ref 0–5)
INFLUENZA A BY PCR: NOT DETECTED
INFLUENZA B BY PCR: NOT DETECTED
KETONES, URINE: NEGATIVE MG/DL
LACTIC ACID: 1.6 MMOL/L (ref 0.5–2.2)
LEUKOCYTE ESTERASE, URINE: NEGATIVE
LIPASE: 29 U/L (ref 13–60)
LYMPHOCYTES ABSOLUTE: 2.08 E9/L (ref 1.5–4)
LYMPHOCYTES RELATIVE PERCENT: 26.9 % (ref 20–42)
MCH RBC QN AUTO: 29.6 PG (ref 26–35)
MCHC RBC AUTO-ENTMCNC: 32.5 % (ref 32–34.5)
MCV RBC AUTO: 91.1 FL (ref 80–99.9)
MONOCYTES ABSOLUTE: 0.63 E9/L (ref 0.1–0.95)
MONOCYTES RELATIVE PERCENT: 8.1 % (ref 2–12)
NEUTROPHILS ABSOLUTE: 4.79 E9/L (ref 1.8–7.3)
NEUTROPHILS RELATIVE PERCENT: 61.9 % (ref 43–80)
NITRITE, URINE: NEGATIVE
PDW BLD-RTO: 13.1 FL (ref 11.5–15)
PH UA: 6 (ref 5–9)
PLATELET # BLD: 327 E9/L (ref 130–450)
PMV BLD AUTO: 8.9 FL (ref 7–12)
POTASSIUM SERPL-SCNC: 3.7 MMOL/L (ref 3.5–5)
PROTEIN UA: NEGATIVE MG/DL
RBC # BLD: 4.05 E12/L (ref 3.5–5.5)
RBC UA: NORMAL /HPF (ref 0–2)
SODIUM BLD-SCNC: 137 MMOL/L (ref 132–146)
SPECIFIC GRAVITY UA: 1.02 (ref 1–1.03)
TOTAL PROTEIN: 7.4 G/DL (ref 6.4–8.3)
UROBILINOGEN, URINE: 0.2 E.U./DL
WBC # BLD: 7.7 E9/L (ref 4.5–11.5)
WBC UA: NORMAL /HPF (ref 0–5)

## 2019-05-07 PROCEDURE — 81001 URINALYSIS AUTO W/SCOPE: CPT

## 2019-05-07 PROCEDURE — 81025 URINE PREGNANCY TEST: CPT

## 2019-05-07 PROCEDURE — 74177 CT ABD & PELVIS W/CONTRAST: CPT

## 2019-05-07 PROCEDURE — 83605 ASSAY OF LACTIC ACID: CPT

## 2019-05-07 PROCEDURE — 36415 COLL VENOUS BLD VENIPUNCTURE: CPT

## 2019-05-07 PROCEDURE — 87502 INFLUENZA DNA AMP PROBE: CPT

## 2019-05-07 PROCEDURE — 83690 ASSAY OF LIPASE: CPT

## 2019-05-07 PROCEDURE — C9113 INJ PANTOPRAZOLE SODIUM, VIA: HCPCS | Performed by: PHYSICIAN ASSISTANT

## 2019-05-07 PROCEDURE — 2580000003 HC RX 258: Performed by: PHYSICIAN ASSISTANT

## 2019-05-07 PROCEDURE — 99284 EMERGENCY DEPT VISIT MOD MDM: CPT

## 2019-05-07 PROCEDURE — 96374 THER/PROPH/DIAG INJ IV PUSH: CPT

## 2019-05-07 PROCEDURE — 6360000004 HC RX CONTRAST MEDICATION: Performed by: RADIOLOGY

## 2019-05-07 PROCEDURE — 80053 COMPREHEN METABOLIC PANEL: CPT

## 2019-05-07 PROCEDURE — 6360000002 HC RX W HCPCS: Performed by: PHYSICIAN ASSISTANT

## 2019-05-07 PROCEDURE — 85025 COMPLETE CBC W/AUTO DIFF WBC: CPT

## 2019-05-07 RX ORDER — FAMOTIDINE 20 MG/1
20 TABLET, FILM COATED ORAL 2 TIMES DAILY
Qty: 14 TABLET | Refills: 0 | Status: SHIPPED | OUTPATIENT
Start: 2019-05-07 | End: 2021-10-30 | Stop reason: SDUPTHER

## 2019-05-07 RX ORDER — PANTOPRAZOLE SODIUM 40 MG/10ML
40 INJECTION, POWDER, LYOPHILIZED, FOR SOLUTION INTRAVENOUS ONCE
Status: COMPLETED | OUTPATIENT
Start: 2019-05-07 | End: 2019-05-07

## 2019-05-07 RX ORDER — 0.9 % SODIUM CHLORIDE 0.9 %
1000 INTRAVENOUS SOLUTION INTRAVENOUS ONCE
Status: COMPLETED | OUTPATIENT
Start: 2019-05-07 | End: 2019-05-07

## 2019-05-07 RX ORDER — PANTOPRAZOLE SODIUM 20 MG/1
20 TABLET, DELAYED RELEASE ORAL DAILY
Qty: 30 TABLET | Refills: 0 | Status: SHIPPED | OUTPATIENT
Start: 2019-05-07 | End: 2021-10-29

## 2019-05-07 RX ADMIN — SODIUM CHLORIDE 1000 ML: 9 INJECTION, SOLUTION INTRAVENOUS at 15:01

## 2019-05-07 RX ADMIN — PANTOPRAZOLE SODIUM 40 MG: 40 INJECTION, POWDER, FOR SOLUTION INTRAVENOUS at 15:00

## 2019-05-07 RX ADMIN — IOPAMIDOL 110 ML: 755 INJECTION, SOLUTION INTRAVENOUS at 14:05

## 2019-05-07 ASSESSMENT — PAIN SCALES - GENERAL: PAINLEVEL_OUTOF10: 10

## 2019-05-07 ASSESSMENT — PAIN DESCRIPTION - LOCATION: LOCATION: ABDOMEN

## 2019-05-07 ASSESSMENT — PAIN DESCRIPTION - ORIENTATION: ORIENTATION: UPPER

## 2019-05-07 ASSESSMENT — PAIN DESCRIPTION - PAIN TYPE: TYPE: ACUTE PAIN

## 2019-05-07 NOTE — ED PROVIDER NOTES
ED Attending  CC: Emily      Department of Emergency Medicine   ED  Provider Note  Admit Date/RoomTime: 2019 12:41 PM  ED Room:   MRN: [id number]  Chief Complaint       Abdominal Pain (since yesterday morning, upper abd constant ); Generalized Body Aches; and Emesis (yesterday x 2, today x 1)    History of Present Illness   Source of history provided by:  patient. History/Exam Limitations: pt Israeli speaking and translation services utilized; patient is a vague historian. Dexter Boogie is a 20 y.o. old female who has a past medical history of:   Past Medical History:   Diagnosis Date    Asthma     presents to the emergency department by private vehicle, for complaints of gradual onset, constant aching pain in the epigastrium without radiation which began 1 day(s) prior to arrival.   There has been no similar episodes in the past. She states she has vomited twice yesterday and once today. Denies any diarrhea or fevers. Denies any chest pain, shortness of breath, nasal congestion, cough. She states she has generalized myalgias as well. She states eating seems to make the pain worse. She did not eat or drink much yesterday but today she drank soda. ROS   Pertinent positives and negatives are stated within HPI, all other systems reviewed and are negative. Past Surgical History:   Procedure Laterality Date     SECTION N/A 2019     SECTION performed by J[facility]ad Cain MD at [facility] L[facility]    TONSILLECTOMY     Social History:  reports that she has never smoked. She has never used smokeless tobacco. She reports that she does not drink alcohol or use drugs. Family History: family history is not on file.    Allergies: Penicillins    Physical Exam           ED Triage Vitals   BP Temp Temp Source Pulse Resp SpO2 Height Weight   19 1230 19 1230 19 1230 19 1230 19 1230 19 1230 19 1231 19 1231   120/89 98.2 °F (36.8 °C) Oral 94 20 96 % 5' 5\" (1.651 m) 220 lb (99.8 kg)      Oxygen Saturation Interpretation: Normal.    · General Appearance/Constitutional:  Alert, development consistent with age. · HEENT:  NC/NT. PERRLA. Airway patent. · Neck:  Supple. No lymphadenopathy. · Respiratory:  No retractions. Lungs Clear to auscultation and breath sounds equal.  · CV:  Regular rate and rhythm. · GI:  General Appearance: normal.         Bowel sounds: hypoactive bowel sounds. Distension:  None. Tenderness: mild tenderness is present in the epigastrium and in the RUQ. Liver: non-tender. Spleen:  non-tender. Pulsatile Mass: absent. · Back: CVA Tenderness: No.  · : (chaperone present during examination). Not indicated/deferred  · Integument:  Normal turgor. Warm, dry, without visible rash, unless noted elsewhere. · Lymphatics: No edema, cap.refill <3sec. · Neurological:  Orientation age-appropriate. Motor functions intact.     Lab / Imaging Results   (All laboratory and radiology results have been personally reviewed by myself)  Labs:  Results for orders placed or performed during the hospital encounter of 05/07/19   Rapid influenza A/B antigens   Result Value Ref Range    Influenza A by PCR Not Detected Not Detected    Influenza B by PCR Not Detected Not Detected   CBC Auto Differential   Result Value Ref Range    WBC 7.7 4.5 - 11.5 E9/L    RBC 4.05 3.50 - 5.50 E12/L    Hemoglobin 12.0 11.5 - 15.5 g/dL    Hematocrit 36.9 34.0 - 48.0 %    MCV 91.1 80.0 - 99.9 fL    MCH 29.6 26.0 - 35.0 pg    MCHC 32.5 32.0 - 34.5 %    RDW 13.1 11.5 - 15.0 fL    Platelets 564 412 - 687 E9/L    MPV 8.9 7.0 - 12.0 fL    Neutrophils % 61.9 43.0 - 80.0 %    Immature Granulocytes % 0.5 0.0 - 5.0 %    Lymphocytes % 26.9 20.0 - 42.0 %    Monocytes % 8.1 2.0 - 12.0 %    Eosinophils % 2.1 0.0 - 6.0 %    Basophils % 0.5 0.0 - 2.0 %    Neutrophils # 4.79 1.80 - 7.30 E9/L    Immature Granulocytes # 0.04 E9/L Lymphocytes # 2.08 1.50 - 4.00 E9/L    Monocytes # 0.63 0.10 - 0.95 E9/L    Eosinophils # 0.16 0.05 - 0.50 E9/L    Basophils # 0.04 0.00 - 0.20 E9/L   Comprehensive Metabolic Panel   Result Value Ref Range    Sodium 137 132 - 146 mmol/L    Potassium 3.7 3.5 - 5.0 mmol/L    Chloride 100 98 - 107 mmol/L    CO2 26 22 - 29 mmol/L    Anion Gap 11 7 - 16 mmol/L    Glucose 95 74 - 99 mg/dL    BUN 10 6 - 20 mg/dL    CREATININE 0.7 0.5 - 1.0 mg/dL    GFR Non-African American >60 >=60 mL/min/1.73    GFR African American >60     Calcium 8.7 8.6 - 10.2 mg/dL    Total Protein 7.4 6.4 - 8.3 g/dL    Alb 4.3 3.5 - 5.2 g/dL    Total Bilirubin <0.2 0.0 - 1.2 mg/dL    Alkaline Phosphatase 88 35 - 104 U/L    ALT 20 0 - 32 U/L    AST 17 0 - 31 U/L   Lipase   Result Value Ref Range    Lipase 29 13 - 60 U/L   Lactic Acid, Plasma   Result Value Ref Range    Lactic Acid 1.6 0.5 - 2.2 mmol/L   Urinalysis with Microscopic   Result Value Ref Range    Color, UA Yellow Straw/Yellow    Clarity, UA Clear Clear    Glucose, Ur Negative Negative mg/dL    Bilirubin Urine Negative Negative    Ketones, Urine Negative Negative mg/dL    Specific Gravity, UA 1.025 1.005 - 1.030    Blood, Urine Negative Negative    pH, UA 6.0 5.0 - 9.0    Protein, UA Negative Negative mg/dL    Urobilinogen, Urine 0.2 <2.0 E.U./dL    Nitrite, Urine Negative Negative    Leukocyte Esterase, Urine Negative Negative    WBC, UA NONE 0 - 5 /HPF    RBC, UA NONE 0 - 2 /HPF    Bacteria, UA NONE /HPF   Pregnancy, urine   Result Value Ref Range    HCG(Urine) Pregnancy Test NEGATIVE NEGATIVE     Imaging: All Radiology results interpreted by Radiologist unless otherwise noted. CT ABDOMEN PELVIS W IV CONTRAST Additional Contrast? None   Final Result   Small amount of free fluid within the pelvis, possibly physiologic in   nature.                        ED Course / Medical Decision Making     Medications   0.9 % sodium chloride bolus (0 mLs Intravenous Stopped 5/7/19 4067)   pantoprazole (PROTONIX) injection 40 mg (40 mg Intravenous Given 5/7/19 1500)   iopamidol (ISOVUE-370) 76 % injection 110 mL (110 mLs Intravenous Given 5/7/19 1405)        Re-examination:  5/7/19       Time: 5360    Patients symptoms are improving. Consults:   None    Procedures:   none    MDM:   Patient presenting for upper abdominal pain and vomiting that occurred several times in the past couple days. Upon examination, the patient had an mild tenderness in the epigastrium and right upper quadrant. CT showing no abnormalities besides some free fluid which likely is physiologic. Lab workup grossly benign. Translation services were utilized and she understood the workup and medications that she will go home with. Counseling: The emergency provider has spoken with the patient and discussed todays results, in addition to providing specific details for the plan of care and counseling regarding the diagnosis and prognosis. Questions are answered at this time and they are agreeable with the plan . Assessment      1. Generalized abdominal pain    2. Non-intractable vomiting with nausea, unspecified vomiting type      Plan   Discharge to home  Patient condition is stable    New Medications     Discharge Medication List as of 5/7/2019  3:35 PM      START taking these medications    Details   pantoprazole (PROTONIX) 20 MG tablet Take 1 tablet by mouth daily, Disp-30 tablet, R-0Print      famotidine (PEPCID) 20 MG tablet Take 1 tablet by mouth 2 times daily for 7 days, Disp-14 tablet, R-0Print           Electronically signed by Vandana Patterson PA-C   DD: 5/7/19  **This report was transcribed using voice recognition software. Every effort was made to ensure accuracy; however, inadvertent computerized transcription errors may be present.   END OF ED PROVIDER NOTE        Alison Scott PA-C  05/08/19 0785

## 2019-05-07 NOTE — ED NOTES
Triage completed using intepreter ipad.  ipad placed at bedside for provider use.      Libra Blackman RN  05/07/19 [id number]

## 2019-06-13 ENCOUNTER — APPOINTMENT (OUTPATIENT)
Dept: ULTRASOUND IMAGING | Age: 19
End: 2019-06-13
Payer: MEDICAID

## 2019-06-13 ENCOUNTER — HOSPITAL ENCOUNTER (EMERGENCY)
Age: 19
Discharge: HOME | End: 2019-06-13
Payer: MEDICAID

## 2019-06-13 VITALS
HEIGHT: 65 IN | BODY MASS INDEX: 36.65 KG/M2 | SYSTOLIC BLOOD PRESSURE: 132 MMHG | RESPIRATION RATE: 16 BRPM | HEART RATE: 94 BPM | OXYGEN SATURATION: 98 % | DIASTOLIC BLOOD PRESSURE: 68 MMHG | TEMPERATURE: 98.3 F | WEIGHT: 220 LBS

## 2019-06-13 DIAGNOSIS — N94.6 DYSMENORRHEA: ICD-10-CM

## 2019-06-13 DIAGNOSIS — R10.2 PELVIC CRAMPING: Primary | ICD-10-CM

## 2019-06-13 LAB
ALBUMIN SERPL-MCNC: 4.4 G/DL (ref 3.5–5.2)
ALP BLD-CCNC: 86 U/L (ref 35–104)
ALT SERPL-CCNC: 21 U/L (ref 0–32)
ANION GAP SERPL CALCULATED.3IONS-SCNC: 11 MMOL/L (ref 7–16)
AST SERPL-CCNC: 16 U/L (ref 0–31)
BACTERIA: NORMAL /HPF
BASOPHILS ABSOLUTE: 0.04 E9/L (ref 0–0.2)
BASOPHILS RELATIVE PERCENT: 0.4 % (ref 0–2)
BILIRUB SERPL-MCNC: 0.2 MG/DL (ref 0–1.2)
BILIRUBIN URINE: NEGATIVE
BLOOD, URINE: ABNORMAL
BUN BLDV-MCNC: 10 MG/DL (ref 6–20)
CALCIUM SERPL-MCNC: 9.5 MG/DL (ref 8.6–10.2)
CHLORIDE BLD-SCNC: 105 MMOL/L (ref 98–107)
CLARITY: CLEAR
CLUE CELLS: NORMAL
CO2: 24 MMOL/L (ref 22–29)
COLOR: YELLOW
CREAT SERPL-MCNC: 0.7 MG/DL (ref 0.5–1)
EOSINOPHILS ABSOLUTE: 0.12 E9/L (ref 0.05–0.5)
EOSINOPHILS RELATIVE PERCENT: 1.2 % (ref 0–6)
EPITHELIAL CELLS, UA: NORMAL /HPF
GFR AFRICAN AMERICAN: >60
GFR NON-AFRICAN AMERICAN: >60 ML/MIN/1.73
GLUCOSE BLD-MCNC: 106 MG/DL (ref 74–99)
GLUCOSE URINE: NEGATIVE MG/DL
HCG, URINE, POC: NEGATIVE
HCT VFR BLD CALC: 37.3 % (ref 34–48)
HEMOGLOBIN: 12.2 G/DL (ref 11.5–15.5)
IMMATURE GRANULOCYTES #: 0.06 E9/L
IMMATURE GRANULOCYTES %: 0.6 % (ref 0–5)
KETONES, URINE: ABNORMAL MG/DL
LEUKOCYTE ESTERASE, URINE: ABNORMAL
LYMPHOCYTES ABSOLUTE: 2.56 E9/L (ref 1.5–4)
LYMPHOCYTES RELATIVE PERCENT: 25.3 % (ref 20–42)
Lab: NORMAL
MCH RBC QN AUTO: 29.6 PG (ref 26–35)
MCHC RBC AUTO-ENTMCNC: 32.7 % (ref 32–34.5)
MCV RBC AUTO: 90.5 FL (ref 80–99.9)
MONOCYTES ABSOLUTE: 0.66 E9/L (ref 0.1–0.95)
MONOCYTES RELATIVE PERCENT: 6.5 % (ref 2–12)
NEGATIVE QC PASS/FAIL: NORMAL
NEUTROPHILS ABSOLUTE: 6.69 E9/L (ref 1.8–7.3)
NEUTROPHILS RELATIVE PERCENT: 66 % (ref 43–80)
NITRITE, URINE: NEGATIVE
PDW BLD-RTO: 12.5 FL (ref 11.5–15)
PH UA: 5.5 (ref 5–9)
PLATELET # BLD: 365 E9/L (ref 130–450)
PMV BLD AUTO: 8.9 FL (ref 7–12)
POSITIVE QC PASS/FAIL: NORMAL
POTASSIUM SERPL-SCNC: 4 MMOL/L (ref 3.5–5)
PROTEIN UA: NEGATIVE MG/DL
RBC # BLD: 4.12 E12/L (ref 3.5–5.5)
RBC UA: NORMAL /HPF (ref 0–2)
SODIUM BLD-SCNC: 140 MMOL/L (ref 132–146)
SOURCE WET PREP: NORMAL
SPECIFIC GRAVITY UA: >=1.03 (ref 1–1.03)
TOTAL PROTEIN: 7.6 G/DL (ref 6.4–8.3)
TRICHOMONAS PREP: NORMAL
UROBILINOGEN, URINE: 0.2 E.U./DL
WBC # BLD: 10.1 E9/L (ref 4.5–11.5)
WBC UA: NORMAL /HPF (ref 0–5)
YEAST WET PREP: NORMAL

## 2019-06-13 PROCEDURE — 80053 COMPREHEN METABOLIC PANEL: CPT

## 2019-06-13 PROCEDURE — 76856 US EXAM PELVIC COMPLETE: CPT

## 2019-06-13 PROCEDURE — 87088 URINE BACTERIA CULTURE: CPT

## 2019-06-13 PROCEDURE — 87210 SMEAR WET MOUNT SALINE/INK: CPT

## 2019-06-13 PROCEDURE — 87591 N.GONORRHOEAE DNA AMP PROB: CPT

## 2019-06-13 PROCEDURE — 85025 COMPLETE CBC W/AUTO DIFF WBC: CPT

## 2019-06-13 PROCEDURE — 81001 URINALYSIS AUTO W/SCOPE: CPT

## 2019-06-13 PROCEDURE — 87491 CHLMYD TRACH DNA AMP PROBE: CPT

## 2019-06-13 PROCEDURE — 99283 EMERGENCY DEPT VISIT LOW MDM: CPT

## 2019-06-13 RX ORDER — NAPROXEN 500 MG/1
500 TABLET ORAL 2 TIMES DAILY WITH MEALS
Qty: 20 TABLET | Refills: 0 | Status: SHIPPED | OUTPATIENT
Start: 2019-06-13 | End: 2021-10-29

## 2019-06-13 NOTE — ED PROVIDER NOTES
[facility]    Department of Emergency Medicine   ED  Provider Note  Admit Date/RoomTime: 2019  2:31 PM  ED Room:   MRN: [id number]  Chief Complaint       Back Pain (right back pain and pelvic pain) and Other    History of Present Illness   Source of history provided by:  patient. History/Exam Limitations: Language barrier, used  iPad      Dexter Boogie is a 20 y.o. old female who has a past medical history of:   Past Medical History:   Diagnosis Date    Asthma     presents to the emergency department by private vehicle, for complaints of sudden onset aching, cramping pain in the lower abdomen with radiation to the lower back which began 2 day(s) prior to arrival.   There has been no similar episodes in the past.  Patient states she is 4 months postpartum and normally sees Dr. Lenny Rivero is her ObGyn. Patient states she has no abnormal vaginal discharge and is not on her menstrual cycle. Patient states that her cycles are irregular since having the Nexplanon placed. Patient states \"I think this is what I'm supposed to have my menstrual cycle. \" Patient denies any new recent sexual partners. Since onset the symptoms have been persistent. The pain is associated with nothing pertinent. The pain is aggravated by none and relieved by nothing. There has been NO chills, cloudy urine, constipation, diarrhea, dysuria, headache, hematuria, urinary frequency, urinary incontinence, urinary urgency, vaginal discharge, vaginal itching or vomiting. GYN History: irregular periods. STD History: history of chlamydia. No LMP recorded. Current Pregnancy: No.     Birth Control: Implanon. Gravid Status:     . ROS   Pertinent positives and negatives are stated within HPI, all other systems reviewed and are negative.     Past Surgical History:   Procedure Laterality Date     SECTION N/A 2019     SECTION performed by Javad Cain MD at [facility] L&D    TONSILLECTOMY Social History:  reports that she has never smoked. She has never used smokeless tobacco. She reports that she does not drink alcohol or use drugs. Family History: family history is not on file. Allergies: Penicillins    Physical Exam           ED Triage Vitals [06/13/19 1432]   BP Temp Temp Source Pulse Resp SpO2 Height Weight   132/68 98.3 °F (36.8 °C) Oral 94 16 98 % 5' 5\" (1.651 m) 220 lb (99.8 kg)      Oxygen Saturation Interpretation: Normal.    · General Appearance/Constitutional:  Alert, development consistent with age. · HEENT:  NC/NT. PERRLA. Airway patent. · Neck:  Supple. No lymphadenopathy. · Respiratory:  No retractions. Lungs Clear to auscultation and breath sounds equal.  · CV:  Regular rate and rhythm. · GI:  General Appearance: normal.         Bowel sounds: normal bowel sounds. Distension:  None. Tenderness: mild tenderness is present in the lower abdomen. Liver: non-tender. Spleen:  non-tender. Pulsatile Mass: absent. Hernia:  no inguinal or femoral hernias noted. · Back: CVA Tenderness: No.  · : (chaperone present during examination). External Genitalia: General appearance; normal, Hair distribution; normal, Lesions absent. Urethral Meatus: Size normal, Location normal, Lesions absent, Prolapse absent. Vagina: discharge thin and white. No odor and Wet Prep/KOH no pathogens. Cervix: normal appearing cervix without discharge or lesions. Uterus:  normal, regular contour, anteverted, tender. Adenexa: no tenderness bilaterally. Anus/Perineum:  normal.  · Integument:  Normal turgor. Warm, dry, without visible rash, unless noted elsewhere. · Lymphatics: No edema, cap.refill <3sec. · Neurological:  Orientation age-appropriate. Motor functions intact.     Lab / Imaging Results   (All laboratory and radiology results have been personally reviewed by myself)  Labs:  Results for orders placed or performed during the hospital encounter of 06/13/19   Wet prep, genital   Result Value Ref Range    Trichomonas Prep None Seen     Yeast, Wet Prep None Seen     Clue Cells, Wet Prep None Seen     Source Wet Prep VAGINAL    C.trachomatis N.gonorrhoeae DNA   Result Value Ref Range    Source Cervix    Comprehensive Metabolic Panel   Result Value Ref Range    Sodium 140 132 - 146 mmol/L    Potassium 4.0 3.5 - 5.0 mmol/L    Chloride 105 98 - 107 mmol/L    CO2 24 22 - 29 mmol/L    Anion Gap 11 7 - 16 mmol/L    Glucose 106 (H) 74 - 99 mg/dL    BUN 10 6 - 20 mg/dL    CREATININE 0.7 0.5 - 1.0 mg/dL    GFR Non-African American >60 >=60 mL/min/1.73    GFR African American >60     Calcium 9.5 8.6 - 10.2 mg/dL    Total Protein 7.6 6.4 - 8.3 g/dL    Alb 4.4 3.5 - 5.2 g/dL    Total Bilirubin 0.2 0.0 - 1.2 mg/dL    Alkaline Phosphatase 86 35 - 104 U/L    ALT 21 0 - 32 U/L    AST 16 0 - 31 U/L   CBC Auto Differential   Result Value Ref Range    WBC 10.1 4.5 - 11.5 E9/L    RBC 4.12 3.50 - 5.50 E12/L    Hemoglobin 12.2 11.5 - 15.5 g/dL    Hematocrit 37.3 34.0 - 48.0 %    MCV 90.5 80.0 - 99.9 fL    MCH 29.6 26.0 - 35.0 pg    MCHC 32.7 32.0 - 34.5 %    RDW 12.5 11.5 - 15.0 fL    Platelets 486 418 - 160 E9/L    MPV 8.9 7.0 - 12.0 fL    Neutrophils % 66.0 43.0 - 80.0 %    Immature Granulocytes % 0.6 0.0 - 5.0 %    Lymphocytes % 25.3 20.0 - 42.0 %    Monocytes % 6.5 2.0 - 12.0 %    Eosinophils % 1.2 0.0 - 6.0 %    Basophils % 0.4 0.0 - 2.0 %    Neutrophils # 6.69 1.80 - 7.30 E9/L    Immature Granulocytes # 0.06 E9/L    Lymphocytes # 2.56 1.50 - 4.00 E9/L    Monocytes # 0.66 0.10 - 0.95 E9/L    Eosinophils # 0.12 0.05 - 0.50 E9/L    Basophils # 0.04 0.00 - 0.20 E9/L   Urinalysis   Result Value Ref Range    Color, UA Yellow Straw/Yellow    Clarity, UA Clear Clear    Glucose, Ur Negative Negative mg/dL    Bilirubin Urine Negative Negative    Ketones, Urine TRACE (A) Negative mg/dL    Specific Gravity, UA >=1.030 1.005 - 1.030    Blood, Urine TRACE-INTACT Negative    pH, UA 5.5 5.0 - 9.0    Protein, UA Negative Negative mg/dL    Urobilinogen, Urine 0.2 <2.0 E.U./dL    Nitrite, Urine Negative Negative    Leukocyte Esterase, Urine TRACE (A) Negative   Microscopic Urinalysis   Result Value Ref Range    WBC, UA 1-3 0 - 5 /HPF    RBC, UA 0-1 0 - 2 /HPF    Epi Cells RARE /HPF    Bacteria, UA NONE /HPF   POC Pregnancy Urine Qual   Result Value Ref Range    HCG, Urine, POC Negative Negative    Lot Number [id number]     Positive QC Pass/Fail Pass     Negative QC Pass/Fail Pass      Imaging: All Radiology results interpreted by Radiologist unless otherwise noted. US PELVIS COMPLETE   Final Result      NORMAL TRANSABDOMINAL PELVIC SONOGRAM      Patient endometrial stripe is prominent and may be secondary to near   menses. .            ED Course / Medical Decision Making   Medications - No data to display     Re-examination:  6/13/19       Time: 1840 Patients symptoms are stable  Consults:   None    Procedures:   none    MDM:   Patient is a 59-year-old female that presented to the emergency department with lower back and pelvic cramping. Patient a full workup including an ultrasound which is noted to be normal. Patient will be diagnosed with dysmenorrhea and told to follow-up with her ObGyn as soon as possible for birth control management. Patient was told she can take naproxen over-the-counter for discomfort she will be sent home with a small prescription. Patient was advised of worsening symptoms and when to return the emergency Department. Patient had negative lab work and urinalysis. Cultures pending. Patient voiced understanding and agreed. Patient was explicitly instructed on specific signs and symptoms on which to return to the emergency room for. Patient was instructed to return to the ER for any new or worsening symptoms. Additional discharge instructions were given verbally. All questions were answered. Patient is comfortable and agreeable with discharge plan. Patient in no acute distress and non-toxic in appearance. At this time the patient is without objective evidence of an acute process requiring hospitalization or inpatient management. They have remained hemodynamically stable throughout their entire ED visit and are stable for discharge with outpatient follow-up. The plan has been discussed in detail and they are aware of the specific conditions for emergent return, as well as the importance of follow-up. Counseling: The emergency provider has spoken with the patient and discussed todays results, in addition to providing specific details for the plan of care and counseling regarding the diagnosis and prognosis. Questions are answered at this time and they are agreeable with the plan. Assessment      1. Pelvic cramping    2. Dysmenorrhea      Plan   Discharge to home  Patient condition is stable    New Medications     New Prescriptions    NAPROXEN (NAPROSYN) 500 MG TABLET    Take 1 tablet by mouth 2 times daily (with meals)     Electronically signed by Darryl Funez PA-C   DD: 6/13/19  **This report was transcribed using voice recognition software. Every effort was made to ensure accuracy; however, inadvertent computerized transcription errors may be present.   END OF ED PROVIDER NOTE      Darryl Funez PA-C  06/13/19 5326

## 2019-06-15 LAB — URINE CULTURE, ROUTINE: NORMAL

## 2019-06-17 LAB
C TRACH DNA GENITAL QL NAA+PROBE: NEGATIVE
N. GONORRHOEAE DNA: NEGATIVE
SOURCE: NORMAL

## 2019-09-02 ENCOUNTER — HOSPITAL ENCOUNTER (EMERGENCY)
Age: 19
Discharge: HOME | End: 2019-09-02
Attending: EMERGENCY MEDICINE
Payer: MEDICAID

## 2019-09-02 ENCOUNTER — APPOINTMENT (OUTPATIENT)
Dept: ULTRASOUND IMAGING | Age: 19
End: 2019-09-02
Payer: MEDICAID

## 2019-09-02 VITALS
WEIGHT: 220 LBS | HEART RATE: 94 BPM | HEIGHT: 64 IN | RESPIRATION RATE: 14 BRPM | OXYGEN SATURATION: 99 % | SYSTOLIC BLOOD PRESSURE: 130 MMHG | BODY MASS INDEX: 37.56 KG/M2 | DIASTOLIC BLOOD PRESSURE: 80 MMHG | TEMPERATURE: 97.9 F

## 2019-09-02 DIAGNOSIS — Z97.5 NEXPLANON IN PLACE: ICD-10-CM

## 2019-09-02 DIAGNOSIS — M79.602 PAIN OF LEFT UPPER EXTREMITY: ICD-10-CM

## 2019-09-02 DIAGNOSIS — Z30.46 ENCOUNTER FOR NEXPLANON REMOVAL: Primary | ICD-10-CM

## 2019-09-02 PROCEDURE — 99284 EMERGENCY DEPT VISIT MOD MDM: CPT

## 2019-09-02 PROCEDURE — 93971 EXTREMITY STUDY: CPT

## 2019-09-02 RX ORDER — CEPHALEXIN 500 MG/1
500 CAPSULE ORAL 3 TIMES DAILY
Qty: 21 CAPSULE | Refills: 0 | Status: SHIPPED | OUTPATIENT
Start: 2019-09-02 | End: 2019-09-09

## 2019-09-02 RX ORDER — LIDOCAINE HYDROCHLORIDE 20 MG/ML
5 INJECTION, SOLUTION INFILTRATION; PERINEURAL ONCE
Status: DISCONTINUED | OUTPATIENT
Start: 2019-09-02 | End: 2019-09-02 | Stop reason: HOSPADM

## 2019-09-02 ASSESSMENT — PAIN DESCRIPTION - FREQUENCY: FREQUENCY: CONTINUOUS

## 2019-09-02 ASSESSMENT — PAIN DESCRIPTION - ORIENTATION: ORIENTATION: LEFT

## 2019-09-02 ASSESSMENT — PAIN SCALES - GENERAL: PAINLEVEL_OUTOF10: 10

## 2019-09-02 ASSESSMENT — PAIN DESCRIPTION - LOCATION: LOCATION: ARM;CHEST

## 2019-09-02 ASSESSMENT — PAIN DESCRIPTION - PAIN TYPE: TYPE: ACUTE PAIN

## 2019-09-02 ASSESSMENT — PAIN DESCRIPTION - DESCRIPTORS: DESCRIPTORS: BURNING

## 2020-02-18 ENCOUNTER — HOSPITAL ENCOUNTER (EMERGENCY)
Age: 20
Discharge: HOME | End: 2020-02-18
Payer: MEDICAID

## 2020-02-18 VITALS
RESPIRATION RATE: 16 BRPM | HEART RATE: 99 BPM | WEIGHT: 266 LBS | BODY MASS INDEX: 42.75 KG/M2 | OXYGEN SATURATION: 98 % | DIASTOLIC BLOOD PRESSURE: 73 MMHG | HEIGHT: 66 IN | SYSTOLIC BLOOD PRESSURE: 132 MMHG | TEMPERATURE: 98.9 F

## 2020-02-18 LAB
BILIRUBIN URINE: NEGATIVE
BLOOD, URINE: NEGATIVE
CLARITY: CLEAR
COLOR: YELLOW
GLUCOSE URINE: NEGATIVE MG/DL
HCG, URINE, POC: NEGATIVE
KETONES, URINE: NEGATIVE MG/DL
LEUKOCYTE ESTERASE, URINE: NEGATIVE
Lab: NORMAL
NEGATIVE QC PASS/FAIL: NORMAL
NITRITE, URINE: NEGATIVE
PH UA: 6 (ref 5–9)
POSITIVE QC PASS/FAIL: NORMAL
PROTEIN UA: NEGATIVE MG/DL
SPECIFIC GRAVITY UA: 1.02 (ref 1–1.03)
UROBILINOGEN, URINE: 0.2 E.U./DL

## 2020-02-18 PROCEDURE — 81003 URINALYSIS AUTO W/O SCOPE: CPT

## 2020-02-18 PROCEDURE — 99283 EMERGENCY DEPT VISIT LOW MDM: CPT

## 2020-02-18 RX ORDER — IBUPROFEN 600 MG/1
600 TABLET ORAL 4 TIMES DAILY PRN
Qty: 40 TABLET | Refills: 0 | Status: SHIPPED | OUTPATIENT
Start: 2020-02-18 | End: 2020-11-19 | Stop reason: ALTCHOICE

## 2020-02-18 ASSESSMENT — ENCOUNTER SYMPTOMS
ABDOMINAL PAIN: 0
COUGH: 0
NAUSEA: 1
SHORTNESS OF BREATH: 0
CHEST TIGHTNESS: 0
BACK PAIN: 0
COLOR CHANGE: 0
DIARRHEA: 0
VOMITING: 0

## 2020-02-18 ASSESSMENT — PAIN SCALES - GENERAL: PAINLEVEL_OUTOF10: 5

## 2020-02-18 ASSESSMENT — PAIN DESCRIPTION - LOCATION: LOCATION: ABDOMEN

## 2020-02-18 ASSESSMENT — PAIN DESCRIPTION - ORIENTATION: ORIENTATION: LOWER

## 2020-02-18 ASSESSMENT — PAIN DESCRIPTION - PAIN TYPE: TYPE: ACUTE PAIN

## 2020-02-19 NOTE — ED PROVIDER NOTES
History:  has a past surgical history that includes Tonsillectomy and  section (N/A, 2019). Social History:  reports that she has never smoked. She has never used smokeless tobacco. She reports that she does not drink alcohol or use drugs. Family History: family history is not on file. The patients home medications have been reviewed. Allergies: Penicillins    -------------------------------------------------- RESULTS -------------------------------------------------  All laboratory and radiology results have been personally reviewed by myself   LABS:  Results for orders placed or performed during the hospital encounter of 20   Urinalysis, reflex to microscopic   Result Value Ref Range    Color, UA Yellow Straw/Yellow    Clarity, UA Clear Clear    Glucose, Ur Negative Negative mg/dL    Bilirubin Urine Negative Negative    Ketones, Urine Negative Negative mg/dL    Specific Gravity, UA 1.025 1.005 - 1.030    Blood, Urine Negative Negative    pH, UA 6.0 5.0 - 9.0    Protein, UA Negative Negative mg/dL    Urobilinogen, Urine 0.2 <2.0 E.U./dL    Nitrite, Urine Negative Negative    Leukocyte Esterase, Urine Negative Negative   POC Pregnancy Urine   Result Value Ref Range    HCG, Urine, POC Negative Negative    Lot Number [id number]     Positive QC Pass/Fail Pass     Negative QC Pass/Fail Pass        RADIOLOGY:  Interpreted by Radiologist.  No orders to display       ------------------------- NURSING NOTES AND VITALS REVIEWED ---------------------------   The nursing notes within the ED encounter and vital signs as below have been reviewed. /73   Pulse 99   Temp 98.9 °F (37.2 °C) (Oral)   Resp 16   Ht 5' 6\" (1.676 m)   Wt 266 lb (120.7 kg)   SpO2 98%   BMI 42.93 kg/m²   Oxygen Saturation Interpretation: Normal      ---------------------------------------------------PHYSICAL EXAM--------------------------------------    Physical Exam  Vitals signs and nursing note reviewed. todays results, in addition to providing specific details for the plan of care and counseling regarding the diagnosis and prognosis. Questions are answered at this time and they are agreeable with the plan.      --------------------------------- IMPRESSION AND DISPOSITION ---------------------------------    IMPRESSION  1. Irregular menses    2. Menstrual cramps        DISPOSITION  Disposition: Discharge to home  Patient condition is good      Electronically signed by Cinda Ferrari PA-C   DD: 2/18/20  **This report was transcribed using voice recognition software. Every effort was made to ensure accuracy; however, inadvertent computerized transcription errors may be present.   END OF ED PROVIDER NOTE          Cinda Ferrari PA-C  02/18/20 9190

## 2020-08-25 ENCOUNTER — APPOINTMENT (OUTPATIENT)
Dept: CT IMAGING | Age: 20
End: 2020-08-25
Payer: MEDICAID

## 2020-08-25 ENCOUNTER — HOSPITAL ENCOUNTER (EMERGENCY)
Age: 20
Discharge: HOME | End: 2020-08-25
Payer: MEDICAID

## 2020-08-25 VITALS
DIASTOLIC BLOOD PRESSURE: 59 MMHG | RESPIRATION RATE: 18 BRPM | TEMPERATURE: 98.4 F | HEIGHT: 66 IN | WEIGHT: 266 LBS | BODY MASS INDEX: 42.75 KG/M2 | HEART RATE: 100 BPM | OXYGEN SATURATION: 98 % | SYSTOLIC BLOOD PRESSURE: 98 MMHG

## 2020-08-25 LAB
ALBUMIN SERPL-MCNC: 4.1 G/DL (ref 3.5–5.2)
ALP BLD-CCNC: 82 U/L (ref 35–104)
ALT SERPL-CCNC: 16 U/L (ref 0–32)
ANION GAP SERPL CALCULATED.3IONS-SCNC: 11 MMOL/L (ref 7–16)
AST SERPL-CCNC: 16 U/L (ref 0–31)
BACTERIA: ABNORMAL /HPF
BASOPHILS ABSOLUTE: 0.05 E9/L (ref 0–0.2)
BASOPHILS RELATIVE PERCENT: 0.4 % (ref 0–2)
BILIRUB SERPL-MCNC: <0.2 MG/DL (ref 0–1.2)
BILIRUBIN URINE: NEGATIVE
BLOOD, URINE: NEGATIVE
BUN BLDV-MCNC: 11 MG/DL (ref 6–20)
CALCIUM SERPL-MCNC: 9.1 MG/DL (ref 8.6–10.2)
CHLORIDE BLD-SCNC: 103 MMOL/L (ref 98–107)
CLARITY: CLEAR
CO2: 24 MMOL/L (ref 22–29)
COLOR: YELLOW
CREAT SERPL-MCNC: 0.6 MG/DL (ref 0.5–1)
EOSINOPHILS ABSOLUTE: 0.33 E9/L (ref 0.05–0.5)
EOSINOPHILS RELATIVE PERCENT: 2.8 % (ref 0–6)
EPITHELIAL CELLS, UA: ABNORMAL /HPF
GFR AFRICAN AMERICAN: >60
GFR NON-AFRICAN AMERICAN: >60 ML/MIN/1.73
GLUCOSE BLD-MCNC: 141 MG/DL (ref 74–99)
GLUCOSE URINE: NEGATIVE MG/DL
HCG, URINE, POC: NEGATIVE
HCT VFR BLD CALC: 40.5 % (ref 34–48)
HEMOGLOBIN: 13.2 G/DL (ref 11.5–15.5)
IMMATURE GRANULOCYTES #: 0.11 E9/L
IMMATURE GRANULOCYTES %: 0.9 % (ref 0–5)
KETONES, URINE: NEGATIVE MG/DL
LEUKOCYTE ESTERASE, URINE: ABNORMAL
LIPASE: 24 U/L (ref 13–60)
LYMPHOCYTES ABSOLUTE: 2.88 E9/L (ref 1.5–4)
LYMPHOCYTES RELATIVE PERCENT: 24.5 % (ref 20–42)
Lab: NORMAL
MCH RBC QN AUTO: 29.5 PG (ref 26–35)
MCHC RBC AUTO-ENTMCNC: 32.6 % (ref 32–34.5)
MCV RBC AUTO: 90.4 FL (ref 80–99.9)
MONOCYTES ABSOLUTE: 0.6 E9/L (ref 0.1–0.95)
MONOCYTES RELATIVE PERCENT: 5.1 % (ref 2–12)
NEGATIVE QC PASS/FAIL: NORMAL
NEUTROPHILS ABSOLUTE: 7.8 E9/L (ref 1.8–7.3)
NEUTROPHILS RELATIVE PERCENT: 66.3 % (ref 43–80)
NITRITE, URINE: NEGATIVE
PDW BLD-RTO: 13.2 FL (ref 11.5–15)
PH UA: 6.5 (ref 5–9)
PLATELET # BLD: 408 E9/L (ref 130–450)
PMV BLD AUTO: 8.6 FL (ref 7–12)
POSITIVE QC PASS/FAIL: NORMAL
POTASSIUM REFLEX MAGNESIUM: 4.2 MMOL/L (ref 3.5–5)
PROTEIN UA: NEGATIVE MG/DL
RBC # BLD: 4.48 E12/L (ref 3.5–5.5)
RBC UA: ABNORMAL /HPF (ref 0–2)
SODIUM BLD-SCNC: 138 MMOL/L (ref 132–146)
SPECIFIC GRAVITY UA: 1.02 (ref 1–1.03)
TOTAL PROTEIN: 7.6 G/DL (ref 6.4–8.3)
UROBILINOGEN, URINE: 0.2 E.U./DL
WBC # BLD: 11.8 E9/L (ref 4.5–11.5)
WBC UA: ABNORMAL /HPF (ref 0–5)

## 2020-08-25 PROCEDURE — 99284 EMERGENCY DEPT VISIT MOD MDM: CPT

## 2020-08-25 PROCEDURE — 85025 COMPLETE CBC W/AUTO DIFF WBC: CPT

## 2020-08-25 PROCEDURE — 80053 COMPREHEN METABOLIC PANEL: CPT

## 2020-08-25 PROCEDURE — 81001 URINALYSIS AUTO W/SCOPE: CPT

## 2020-08-25 PROCEDURE — 6360000002 HC RX W HCPCS: Performed by: PHYSICIAN ASSISTANT

## 2020-08-25 PROCEDURE — 96372 THER/PROPH/DIAG INJ SC/IM: CPT

## 2020-08-25 PROCEDURE — 6370000000 HC RX 637 (ALT 250 FOR IP): Performed by: PHYSICIAN ASSISTANT

## 2020-08-25 PROCEDURE — 99285 EMERGENCY DEPT VISIT HI MDM: CPT

## 2020-08-25 PROCEDURE — 83690 ASSAY OF LIPASE: CPT

## 2020-08-25 PROCEDURE — 74176 CT ABD & PELVIS W/O CONTRAST: CPT

## 2020-08-25 RX ORDER — ONDANSETRON 4 MG/1
4 TABLET, ORALLY DISINTEGRATING ORAL 3 TIMES DAILY PRN
Qty: 21 TABLET | Refills: 0 | Status: SHIPPED | OUTPATIENT
Start: 2020-08-25 | End: 2021-10-29

## 2020-08-25 RX ORDER — ONDANSETRON 2 MG/ML
4 INJECTION INTRAMUSCULAR; INTRAVENOUS ONCE
Status: DISCONTINUED | OUTPATIENT
Start: 2020-08-25 | End: 2020-08-25

## 2020-08-25 RX ORDER — MORPHINE SULFATE 4 MG/ML
4 INJECTION, SOLUTION INTRAMUSCULAR; INTRAVENOUS ONCE
Status: COMPLETED | OUTPATIENT
Start: 2020-08-25 | End: 2020-08-25

## 2020-08-25 RX ORDER — ONDANSETRON 4 MG/1
4 TABLET, ORALLY DISINTEGRATING ORAL ONCE
Status: COMPLETED | OUTPATIENT
Start: 2020-08-25 | End: 2020-08-25

## 2020-08-25 RX ORDER — MORPHINE SULFATE 4 MG/ML
4 INJECTION, SOLUTION INTRAMUSCULAR; INTRAVENOUS ONCE
Status: DISCONTINUED | OUTPATIENT
Start: 2020-08-25 | End: 2020-08-25

## 2020-08-25 RX ORDER — PANTOPRAZOLE SODIUM 20 MG/1
20 TABLET, DELAYED RELEASE ORAL
Qty: 14 TABLET | Refills: 0 | Status: SHIPPED | OUTPATIENT
Start: 2020-08-25 | End: 2021-10-29

## 2020-08-25 RX ADMIN — ONDANSETRON 4 MG: 4 TABLET, ORALLY DISINTEGRATING ORAL at 14:52

## 2020-08-25 RX ADMIN — LIDOCAINE HYDROCHLORIDE: 20 SOLUTION ORAL; TOPICAL at 15:44

## 2020-08-25 RX ADMIN — MORPHINE SULFATE 4 MG: 4 INJECTION, SOLUTION INTRAMUSCULAR; INTRAVENOUS at 14:51

## 2020-08-25 ASSESSMENT — PAIN SCALES - GENERAL
PAINLEVEL_OUTOF10: 4
PAINLEVEL_OUTOF10: 7

## 2020-08-25 ASSESSMENT — PAIN DESCRIPTION - LOCATION: LOCATION: ABDOMEN

## 2020-08-25 ASSESSMENT — PAIN DESCRIPTION - PAIN TYPE: TYPE: ACUTE PAIN

## 2020-08-25 ASSESSMENT — PAIN SCALES - WONG BAKER: WONGBAKER_NUMERICALRESPONSE: 8

## 2020-08-25 NOTE — ED NOTES
Unable to obtain IV access.  Pierre FREED notified            Sangeeta Guthrie [place]  93/61/40 7920

## 2020-08-25 NOTE — ED PROVIDER NOTES
MCV 90.4 80.0 - 99.9 fL    MCH 29.5 26.0 - 35.0 pg    MCHC 32.6 32.0 - 34.5 %    RDW 13.2 11.5 - 15.0 fL    Platelets 373 725 - 533 E9/L    MPV 8.6 7.0 - 12.0 fL    Neutrophils % 66.3 43.0 - 80.0 %    Immature Granulocytes % 0.9 0.0 - 5.0 %    Lymphocytes % 24.5 20.0 - 42.0 %    Monocytes % 5.1 2.0 - 12.0 %    Eosinophils % 2.8 0.0 - 6.0 %    Basophils % 0.4 0.0 - 2.0 %    Neutrophils Absolute 7.80 (H) 1.80 - 7.30 E9/L    Immature Granulocytes # 0.11 E9/L    Lymphocytes Absolute 2.88 1.50 - 4.00 E9/L    Monocytes Absolute 0.60 0.10 - 0.95 E9/L    Eosinophils Absolute 0.33 0.05 - 0.50 E9/L    Basophils Absolute 0.05 0.00 - 0.20 E9/L   Comprehensive Metabolic Panel w/ Reflex to MG   Result Value Ref Range    Sodium 138 132 - 146 mmol/L    Potassium reflex Magnesium 4.2 3.5 - 5.0 mmol/L    Chloride 103 98 - 107 mmol/L    CO2 24 22 - 29 mmol/L    Anion Gap 11 7 - 16 mmol/L    Glucose 141 (H) 74 - 99 mg/dL    BUN 11 6 - 20 mg/dL    CREATININE 0.6 0.5 - 1.0 mg/dL    GFR Non-African American >60 >=60 mL/min/1.73    GFR African American >60     Calcium 9.1 8.6 - 10.2 mg/dL    Total Protein 7.6 6.4 - 8.3 g/dL    Alb 4.1 3.5 - 5.2 g/dL    Total Bilirubin <0.2 0.0 - 1.2 mg/dL    Alkaline Phosphatase 82 35 - 104 U/L    ALT 16 0 - 32 U/L    AST 16 0 - 31 U/L   Lipase   Result Value Ref Range    Lipase 24 13 - 60 U/L   Urinalysis, reflex to microscopic   Result Value Ref Range    Color, UA Yellow Straw/Yellow    Clarity, UA Clear Clear    Glucose, Ur Negative Negative mg/dL    Bilirubin Urine Negative Negative    Ketones, Urine Negative Negative mg/dL    Specific Gravity, UA 1.020 1.005 - 1.030    Blood, Urine Negative Negative    pH, UA 6.5 5.0 - 9.0    Protein, UA Negative Negative mg/dL    Urobilinogen, Urine 0.2 <2.0 E.U./dL    Nitrite, Urine Negative Negative    Leukocyte Esterase, Urine TRACE (A) Negative   Microscopic Urinalysis   Result Value Ref Range    WBC, UA 2-5 0 - 5 /HPF    RBC, UA 0-1 0 - 2 /HPF    Epithelial Cells, UA FEW /HPF    Bacteria, UA FEW (A) None Seen /HPF   POC Pregnancy Urine   Result Value Ref Range    HCG, Urine, POC Negative Negative    Lot Number [id number]     Positive QC Pass/Fail Acceptable     Negative QC Pass/Fail Acceptable        RADIOLOGY:  Interpreted by Radiologist.  CT ABDOMEN PELVIS WO CONTRAST Additional Contrast? None   Final Result   No appendicitis. Left ovarian cyst measuring approximately 2.4 cm may be physiologic. No acute intra-abdominal or intrapelvic process. ------------------------- NURSING NOTES AND VITALS REVIEWED ---------------------------   The nursing notes within the ED encounter and vital signs as below have been reviewed. BP (!) 98/59   Pulse 100   Temp 98.4 °F (36.9 °C) (Oral)   Resp 18   Ht 5' 6\" (1.676 m)   Wt 266 lb (120.7 kg)   SpO2 98%   BMI 42.93 kg/m²   Oxygen Saturation Interpretation: Normal      ---------------------------------------------------PHYSICAL EXAM--------------------------------------      Constitutional/General: Alert and oriented x3, well appearing, non toxic in NAD, obese  Head: Normocephalic and atraumatic  Eyes: PERRL, EOMI  Mouth: Oropharynx clear, handling secretions, no trismus  Neck: Supple, full ROM,   Pulmonary: Lungs clear to auscultation bilaterally, no wheezes, rales, or rhonchi. Not in respiratory distress  Cardiovascular:  Regular rate and rhythm, no murmurs, gallops, or rubs. 2+ distal pulses  Abdomen: Soft, epigastric TTP, non distended,   Extremities: Moves all extremities x 4.  Warm and well perfused  Skin: warm and dry without rash  Neurologic: GCS 15,  Psych: Normal Affect      ------------------------------ ED COURSE/MEDICAL DECISION MAKING----------------------  Medications   ondansetron (ZOFRAN-ODT) disintegrating tablet 4 mg (4 mg Oral Given 8/25/20 1452)   morphine injection 4 mg (4 mg Intramuscular Given 8/25/20 1451)   aluminum & magnesium hydroxide-simethicone (MAALOX) 30 mL, lidocaine viscous hcl (XYLOCAINE) 5 mL (GI COCKTAIL) ( Oral Given 8/25/20 1544)         ED COURSE:  ED Course as of Aug 25 1557   Tue Aug 25, 2020   1551 Reassessed patient. Remains stable. Reports symptom improvement in the ED. Discussed results and recommendations. [MS]      ED Course User Index  [MS] Rianna MontoyaNinoska stokes       Medical Decision Making:    Patient is a 25-year-old female presenting emergency department epigastric abdominal pain and vomiting. Lab work is unremarkable. CT does not reveal any acute pathology. Patient symptoms improved after pain medication, nausea medication, and GI cocktail in the ED. Recommended close follow-up with PCP for recheck return to the emergency department any new or worsening symptoms. Patient voiced understanding is agreeable with above treatment plan. Counseling: The emergency provider has spoken with the patient and discussed todays results, in addition to providing specific details for the plan of care and counseling regarding the diagnosis and prognosis. Questions are answered at this time and they are agreeable with the plan.      --------------------------------- IMPRESSION AND DISPOSITION ---------------------------------    IMPRESSION  1. Gastroesophageal reflux disease, esophagitis presence not specified        DISPOSITION  Disposition: Discharge to home  Patient condition is stable      NOTE: This report was transcribed using voice recognition software.  Every effort was made to ensure accuracy; however, inadvertent computerized transcription errors may be present        Ninoska Kim  08/25/20 1550

## 2020-11-18 ENCOUNTER — HOSPITAL ENCOUNTER (EMERGENCY)
Age: 20
Discharge: HOME | End: 2020-11-19
Payer: MEDICAID

## 2020-11-18 VITALS
BODY MASS INDEX: 38.32 KG/M2 | HEART RATE: 116 BPM | DIASTOLIC BLOOD PRESSURE: 74 MMHG | TEMPERATURE: 98 F | HEIGHT: 65 IN | WEIGHT: 230 LBS | RESPIRATION RATE: 20 BRPM | OXYGEN SATURATION: 97 % | SYSTOLIC BLOOD PRESSURE: 153 MMHG

## 2020-11-18 PROCEDURE — U0003 INFECTIOUS AGENT DETECTION BY NUCLEIC ACID (DNA OR RNA); SEVERE ACUTE RESPIRATORY SYNDROME CORONAVIRUS 2 (SARS-COV-2) (CORONAVIRUS DISEASE [COVID-19]), AMPLIFIED PROBE TECHNIQUE, MAKING USE OF HIGH THROUGHPUT TECHNOLOGIES AS DESCRIBED BY CMS-2020-01-R: HCPCS

## 2020-11-18 PROCEDURE — 99283 EMERGENCY DEPT VISIT LOW MDM: CPT

## 2020-11-18 PROCEDURE — 6370000000 HC RX 637 (ALT 250 FOR IP): Performed by: PHYSICIAN ASSISTANT

## 2020-11-18 PROCEDURE — 87502 INFLUENZA DNA AMP PROBE: CPT

## 2020-11-18 RX ORDER — IBUPROFEN 600 MG/1
600 TABLET ORAL ONCE
Status: COMPLETED | OUTPATIENT
Start: 2020-11-18 | End: 2020-11-18

## 2020-11-18 RX ADMIN — IBUPROFEN 600 MG: 600 TABLET ORAL at 23:45

## 2020-11-18 ASSESSMENT — PAIN DESCRIPTION - LOCATION: LOCATION: GENERALIZED

## 2020-11-18 ASSESSMENT — PAIN SCALES - GENERAL
PAINLEVEL_OUTOF10: 8
PAINLEVEL_OUTOF10: 9

## 2020-11-19 LAB
INFLUENZA A BY PCR: NOT DETECTED
INFLUENZA B BY PCR: NOT DETECTED

## 2020-11-19 RX ORDER — IBUPROFEN 600 MG/1
600 TABLET ORAL EVERY 6 HOURS PRN
Qty: 40 TABLET | Refills: 0 | Status: SHIPPED | OUTPATIENT
Start: 2020-11-19 | End: 2021-10-29

## 2020-11-19 RX ORDER — BROMPHENIRAMINE MALEATE, PSEUDOEPHEDRINE HYDROCHLORIDE, AND DEXTROMETHORPHAN HYDROBROMIDE 2; 30; 10 MG/5ML; MG/5ML; MG/5ML
5 SYRUP ORAL 4 TIMES DAILY PRN
Qty: 120 ML | Refills: 0 | Status: SHIPPED | OUTPATIENT
Start: 2020-11-19 | End: 2020-11-24

## 2020-11-19 NOTE — ED PROVIDER NOTES
section (N/A, 2019). Social History:  reports that she has never smoked. She has never used smokeless tobacco. She reports that she does not drink alcohol or use drugs. Family History: family history is not on file. The patients home medications have been reviewed. Allergies: Penicillins    --------------------------------- RESULTS ------------------------------------------  All laboratory and radiology results have been personally reviewed by myself   LABS:  Results for orders placed or performed during the hospital encounter of 20   Rapid influenza A/B antigens    Specimen: Nares   Result Value Ref Range    Influenza A by PCR Not Detected Not Detected    Influenza B by PCR Not Detected Not Detected   Covid-19 Ambulatory   Result Value Ref Range    Source NP swab        RADIOLOGY:  Interpreted by Radiologist.  No orders to display       ----------------- NURSING NOTES AND VITALS REVIEWED ---------------   The nursing notes within the ED encounter and vital signs as below have been reviewed. BP (!) 153/74   Pulse 116   Temp 98 °F (36.7 °C) (Infrared)   Resp 20   Ht 5' 5\" (1.651 m)   Wt 230 lb (104.3 kg)   LMP  (LMP Unknown)   SpO2 97%   BMI 38.27 kg/m²   Oxygen Saturation Interpretation: Normal      --------------------------------PHYSICAL EXAM------------------------------------      Constitutional/General: Alert and oriented x3, well appearing, non toxic in NAD  Head: NC/AT  Eyes: PERRL, EOMI  TM's intact and clear. Posterior pharynx with mild erythema and clear PND. Mouth: Oropharynx clear, handling secretions, no trismus  Neck: Supple, full ROM, no meningeal signs  Pulmonary: Lungs decreased but clear to auscultation bilaterally, no wheezes, rales, or rhonchi. Not in respiratory distress  Cardiovascular:  Regular rate and rhythm, no murmurs, gallops, or rubs. 2+ distal pulses  Abdomen: Soft, + BS. No distension. Nontender.   No palpable rigidity, rebound or guarding  Extremities: Moves all extremities x 4. Warm and well perfused  Skin: warm and dry without rash  Neurologic: GCS 15,  Intact. No focal deficits  Psych: Normal Affect      ------------------------ ED COURSE/MEDICAL DECISION MAKING----------------------  Medications   ibuprofen (ADVIL;MOTRIN) tablet 600 mg (600 mg Oral Given 11/18/20 5044)         Medical Decision Making:    Pt has URI, likely viral.   She is negative for influenza here. Covid testing sent. Discussed need to self quarantine till she has results and is feeling better. Plan discharge home with Bromfed DM and Motrin. F/U as needed if persistent or ongoing complaints. Counseling: The emergency provider has spoken with the patient and discussed todays results, in addition to providing specific details for the plan of care and counseling regarding the diagnosis and prognosis. Questions are answered at this time and they are agreeable with the plan.      ------------------------ IMPRESSION AND DISPOSITION -------------------------------    IMPRESSION  1. Upper respiratory tract infection, unspecified type        DISPOSITION  Disposition: Discharge to home  Patient condition is stable                   Jocelyn Cerrato PA-C  11/19/20 0016      ATTENDING PROVIDER ATTESTATION:     Supervising Physician, on-site, available for consultation, non-participatory in the evaluation or care of this patient.          [address] [facility],   11/19/20 2057

## 2020-11-20 LAB
SARS-COV-2: NOT DETECTED
SOURCE: NORMAL

## 2021-02-21 ENCOUNTER — HOSPITAL ENCOUNTER (EMERGENCY)
Age: 21
Discharge: HOME | End: 2021-02-21
Attending: EMERGENCY MEDICINE
Payer: MEDICAID

## 2021-02-21 ENCOUNTER — APPOINTMENT (OUTPATIENT)
Dept: GENERAL RADIOLOGY | Age: 21
End: 2021-02-21
Payer: MEDICAID

## 2021-02-21 VITALS
HEART RATE: 87 BPM | BODY MASS INDEX: 38.32 KG/M2 | TEMPERATURE: 98.3 F | RESPIRATION RATE: 17 BRPM | SYSTOLIC BLOOD PRESSURE: 136 MMHG | OXYGEN SATURATION: 100 % | DIASTOLIC BLOOD PRESSURE: 85 MMHG | HEIGHT: 65 IN | WEIGHT: 230 LBS

## 2021-02-21 DIAGNOSIS — R53.83 FATIGUE, UNSPECIFIED TYPE: ICD-10-CM

## 2021-02-21 DIAGNOSIS — R55 SYNCOPE AND COLLAPSE: Primary | ICD-10-CM

## 2021-02-21 DIAGNOSIS — N93.8 DYSFUNCTIONAL UTERINE BLEEDING: ICD-10-CM

## 2021-02-21 LAB
ABO/RH: NORMAL
ANION GAP SERPL CALCULATED.3IONS-SCNC: 11 MMOL/L (ref 7–16)
ANTIBODY SCREEN: NORMAL
APTT: 33.2 SEC (ref 24.5–35.1)
BILIRUBIN URINE: NEGATIVE
BLOOD, URINE: NEGATIVE
BUN BLDV-MCNC: 11 MG/DL (ref 6–20)
CALCIUM SERPL-MCNC: 9.1 MG/DL (ref 8.6–10.2)
CHLORIDE BLD-SCNC: 100 MMOL/L (ref 98–107)
CLARITY: CLEAR
CO2: 26 MMOL/L (ref 22–29)
COLOR: YELLOW
CREAT SERPL-MCNC: 0.7 MG/DL (ref 0.5–1)
D DIMER: <200 NG/ML DDU
GFR AFRICAN AMERICAN: >60
GFR NON-AFRICAN AMERICAN: >60 ML/MIN/1.73
GLUCOSE BLD-MCNC: 112 MG/DL (ref 74–99)
GLUCOSE URINE: NEGATIVE MG/DL
HCG, URINE, POC: NEGATIVE
HCT VFR BLD CALC: 39.6 % (ref 34–48)
HEMOGLOBIN: 13.3 G/DL (ref 11.5–15.5)
INR BLD: 1.1
KETONES, URINE: NEGATIVE MG/DL
LEUKOCYTE ESTERASE, URINE: NEGATIVE
Lab: NORMAL
MCH RBC QN AUTO: 29.8 PG (ref 26–35)
MCHC RBC AUTO-ENTMCNC: 33.6 % (ref 32–34.5)
MCV RBC AUTO: 88.8 FL (ref 80–99.9)
NEGATIVE QC PASS/FAIL: NORMAL
NITRITE, URINE: NEGATIVE
PDW BLD-RTO: 12.5 FL (ref 11.5–15)
PH UA: 6 (ref 5–9)
PLATELET # BLD: 391 E9/L (ref 130–450)
PMV BLD AUTO: 8.3 FL (ref 7–12)
POSITIVE QC PASS/FAIL: NORMAL
POTASSIUM SERPL-SCNC: 4.3 MMOL/L (ref 3.5–5)
PROTEIN UA: NEGATIVE MG/DL
PROTHROMBIN TIME: 12.8 SEC (ref 9.3–12.4)
RBC # BLD: 4.46 E12/L (ref 3.5–5.5)
SODIUM BLD-SCNC: 137 MMOL/L (ref 132–146)
SPECIFIC GRAVITY UA: >=1.03 (ref 1–1.03)
TROPONIN: <0.01 NG/ML (ref 0–0.03)
UROBILINOGEN, URINE: 0.2 E.U./DL
WBC # BLD: 15.3 E9/L (ref 4.5–11.5)

## 2021-02-21 PROCEDURE — 84484 ASSAY OF TROPONIN QUANT: CPT

## 2021-02-21 PROCEDURE — 86900 BLOOD TYPING SEROLOGIC ABO: CPT

## 2021-02-21 PROCEDURE — 85027 COMPLETE CBC AUTOMATED: CPT

## 2021-02-21 PROCEDURE — 86850 RBC ANTIBODY SCREEN: CPT

## 2021-02-21 PROCEDURE — 85378 FIBRIN DEGRADE SEMIQUANT: CPT

## 2021-02-21 PROCEDURE — 80048 BASIC METABOLIC PNL TOTAL CA: CPT

## 2021-02-21 PROCEDURE — 86901 BLOOD TYPING SEROLOGIC RH(D): CPT

## 2021-02-21 PROCEDURE — 2580000003 HC RX 258: Performed by: EMERGENCY MEDICINE

## 2021-02-21 PROCEDURE — 99283 EMERGENCY DEPT VISIT LOW MDM: CPT

## 2021-02-21 PROCEDURE — 71045 X-RAY EXAM CHEST 1 VIEW: CPT

## 2021-02-21 PROCEDURE — 93005 ELECTROCARDIOGRAM TRACING: CPT | Performed by: EMERGENCY MEDICINE

## 2021-02-21 PROCEDURE — 85610 PROTHROMBIN TIME: CPT

## 2021-02-21 PROCEDURE — 85730 THROMBOPLASTIN TIME PARTIAL: CPT

## 2021-02-21 PROCEDURE — 81003 URINALYSIS AUTO W/O SCOPE: CPT

## 2021-02-21 RX ORDER — 0.9 % SODIUM CHLORIDE 0.9 %
1000 INTRAVENOUS SOLUTION INTRAVENOUS ONCE
Status: COMPLETED | OUTPATIENT
Start: 2021-02-21 | End: 2021-02-21

## 2021-02-21 RX ADMIN — SODIUM CHLORIDE 1000 ML: 9 INJECTION, SOLUTION INTRAVENOUS at 22:20

## 2021-02-21 ASSESSMENT — ENCOUNTER SYMPTOMS
ABDOMINAL DISTENTION: 0
SORE THROAT: 0
DIARRHEA: 0
SHORTNESS OF BREATH: 1
COUGH: 0
VOMITING: 0
EYE DISCHARGE: 0
EYE PAIN: 0
SINUS PRESSURE: 0
EYE REDNESS: 0
BACK PAIN: 0
WHEEZING: 0
NAUSEA: 0

## 2021-02-22 LAB
EKG ATRIAL RATE: 108 BPM
EKG P AXIS: 35 DEGREES
EKG P-R INTERVAL: 140 MS
EKG Q-T INTERVAL: 332 MS
EKG QRS DURATION: 80 MS
EKG QTC CALCULATION (BAZETT): 444 MS
EKG R AXIS: 45 DEGREES
EKG T AXIS: 29 DEGREES
EKG VENTRICULAR RATE: 108 BPM

## 2021-02-22 PROCEDURE — 93010 ELECTROCARDIOGRAM REPORT: CPT | Performed by: INTERNAL MEDICINE

## 2021-02-22 NOTE — ED TRIAGE NOTES
Patient had a syncopal episode while walking after standing up quickly. Patient states she had birth control implant removed beginning of 2020, started on injection right away but stopped in April due to constant bleeding. Patient reports bleeding for 3 day and then no bleeding for 4 days and a constant off and on period since April. States her OBGYN is not doing anything for the bleeding. Patient reports feeling weak.

## 2021-02-22 NOTE — ED PROVIDER NOTES
Patient is a 23 y/o female who presents to the ED with vaginal bleeding, fatigue and syncope. Patient states that she has had intermittent vaginal bleeding for the past 10 months. She states that she feels fatigued. Today, she stood up and was walking with her family when she became dizzy and passed out. She denies any chest pain. She does feel that her heart is racing. She does feel short of breath. She states that she has seen her gynecologist for this, however, \"They don't do anything. \" She denies any abdominal pain. Review of Systems   Constitutional: Positive for fatigue. Negative for chills and fever. HENT: Negative for ear pain, sinus pressure and sore throat. Eyes: Negative for pain, discharge and redness. Respiratory: Positive for shortness of breath. Negative for cough and wheezing. Cardiovascular: Positive for palpitations. Negative for chest pain. Gastrointestinal: Negative for abdominal distention, diarrhea, nausea and vomiting. Genitourinary: Positive for vaginal bleeding. Negative for dysuria and frequency. Musculoskeletal: Negative for arthralgias and back pain. Skin: Negative for rash and wound. Neurological: Positive for dizziness and syncope. Negative for weakness and headaches. Hematological: Negative for adenopathy. All other systems reviewed and are negative. Physical Exam  Vitals signs and nursing note reviewed. Constitutional:       Appearance: She is obese. HENT:      Head: Normocephalic and atraumatic. Right Ear: External ear normal.      Left Ear: External ear normal.      Nose: Nose normal.      Mouth/Throat:      Mouth: Mucous membranes are moist.   Eyes:      Conjunctiva/sclera: Conjunctivae normal.      Pupils: Pupils are equal, round, and reactive to light. Neck:      Musculoskeletal: Normal range of motion and neck supple. Cardiovascular:      Rate and Rhythm: Regular rhythm. Tachycardia present. Heart sounds: No murmur. Pulmonary:      Effort: Pulmonary effort is normal. No respiratory distress. Breath sounds: Normal breath sounds. No stridor. No wheezing, rhonchi or rales. Abdominal:      General: Bowel sounds are normal. There is no distension. Palpations: Abdomen is soft. Tenderness: There is no abdominal tenderness. There is no guarding. Musculoskeletal: Normal range of motion. General: No swelling. Skin:     General: Skin is warm and dry. Findings: No rash. Neurological:      Mental Status: She is alert and oriented to person, place, and time. Procedures     MDM           EKG:  Sinus tachycardia with ventricular rate of 108. MD interval, QRS duration and QT interval within normal range. Normal axis. No ST segment abnormalities to suggest acute ischemia.         --------------------------------------------- PAST HISTORY ---------------------------------------------  Past Medical History:  has a past medical history of Asthma. Past Surgical History:  has a past surgical history that includes Tonsillectomy and  section (N/A, 2019). Social History:  reports that she has never smoked. She has never used smokeless tobacco. She reports that she does not drink alcohol or use drugs. Family History: family history is not on file. The patients home medications have been reviewed.     Allergies: Penicillins    -------------------------------------------------- RESULTS -------------------------------------------------  Labs:  Results for orders placed or performed during the hospital encounter of    Basic metabolic panel   Result Value Ref Range    Sodium 137 132 - 146 mmol/L    Potassium 4.3 3.5 - 5.0 mmol/L    Chloride 100 98 - 107 mmol/L    CO2 26 22 - 29 mmol/L    Anion Gap 11 7 - 16 mmol/L    Glucose 112 (H) 74 - 99 mg/dL    BUN 11 6 - 20 mg/dL    CREATININE 0.7 0.5 - 1.0 mg/dL    GFR Non-African American >60 >=60 mL/min/1.73    GFR African American >60 Calcium 9.1 8.6 - 10.2 mg/dL   CBC   Result Value Ref Range    WBC 15.3 (H) 4.5 - 11.5 E9/L    RBC 4.46 3.50 - 5.50 E12/L    Hemoglobin 13.3 11.5 - 15.5 g/dL    Hematocrit 39.6 34.0 - 48.0 %    MCV 88.8 80.0 - 99.9 fL    MCH 29.8 26.0 - 35.0 pg    MCHC 33.6 32.0 - 34.5 %    RDW 12.5 11.5 - 15.0 fL    Platelets 074 837 - 464 E9/L    MPV 8.3 7.0 - 12.0 fL   Troponin   Result Value Ref Range    Troponin <0.01 0.00 - 0.03 ng/mL   D-Dimer, Quantitative   Result Value Ref Range    D-Dimer, Quant <200 ng/mL DDU   Protime-INR   Result Value Ref Range    Protime 12.8 (H) 9.3 - 12.4 sec    INR 1.1    APTT   Result Value Ref Range    aPTT 33.2 24.5 - 35.1 sec   Urinalysis   Result Value Ref Range    Color, UA Yellow Straw/Yellow    Clarity, UA Clear Clear    Glucose, Ur Negative Negative mg/dL    Bilirubin Urine Negative Negative    Ketones, Urine Negative Negative mg/dL    Specific Gravity, UA >=1.030 1.005 - 1.030    Blood, Urine Negative Negative    pH, UA 6.0 5.0 - 9.0    Protein, UA Negative Negative mg/dL    Urobilinogen, Urine 0.2 <2.0 E.U./dL    Nitrite, Urine Negative Negative    Leukocyte Esterase, Urine Negative Negative   POC Pregnancy Urine Qual   Result Value Ref Range    HCG, Urine, POC Negative Negative    Lot Number [id number]     Positive QC Pass/Fail Pass     Negative QC Pass/Fail Pass    EKG 12 Lead   Result Value Ref Range    Ventricular Rate 108 BPM    Atrial Rate 108 BPM    P-R Interval 140 ms    QRS Duration 80 ms    Q-T Interval 332 ms    QTc Calculation (Bazett) 444 ms    P Axis 35 degrees    R Axis 45 degrees    T Axis 29 degrees   TYPE AND SCREEN   Result Value Ref Range    ABO/Rh A POS     Antibody Screen NEG        Radiology:  XR CHEST PORTABLE   Final Result   Low lung volumes. No additional cardiopulmonary pathology seen.           ------------------------- NURSING NOTES AND VITALS REVIEWED ---------------------------  Date / Time Roomed:  2/21/2021  9:16 PM  ED Bed Assignment:  09/09    The nursing notes within the ED encounter and vital signs as below have been reviewed. BP (!) 146/84   Pulse 119   Temp 98.3 °F (36.8 °C) (Oral)   Resp 20   Ht 5' 5\" (1.651 m)   Wt 230 lb (104.3 kg)   SpO2 96%   BMI 38.27 kg/m²   Oxygen Saturation Interpretation: Normal      ------------------------------------------ PROGRESS NOTES ------------------------------------------  I have spoken with the patient and discussed todays results, in addition to providing specific details for the plan of care and counseling regarding the diagnosis and prognosis. Their questions are answered at this time and they are agreeable with the plan. I discussed at length with them reasons for immediate return here for re evaluation. They will followup with primary care by calling their office tomorrow. --------------------------------- ADDITIONAL PROVIDER NOTES ---------------------------------  At this time the patient is without objective evidence of an acute process requiring hospitalization or inpatient management. They have remained hemodynamically stable throughout their entire ED visit and are stable for discharge with outpatient follow-up. The plan has been discussed in detail and they are aware of the specific conditions for emergent return, as well as the importance of follow-up. New Prescriptions    No medications on file       Diagnosis:  1. Syncope and collapse    2. Fatigue, unspecified type    3. Dysfunctional uterine bleeding        Disposition:  Patient's disposition: Discharge to home  Patient's condition is stable.          [address] [facility],   02/21/21 1707

## 2021-03-16 ENCOUNTER — APPOINTMENT (OUTPATIENT)
Dept: GENERAL RADIOLOGY | Age: 21
End: 2021-03-16
Payer: MEDICAID

## 2021-03-16 ENCOUNTER — HOSPITAL ENCOUNTER (EMERGENCY)
Age: 21
Discharge: HOME | End: 2021-03-16
Attending: EMERGENCY MEDICINE
Payer: MEDICAID

## 2021-03-16 VITALS
OXYGEN SATURATION: 100 % | DIASTOLIC BLOOD PRESSURE: 75 MMHG | SYSTOLIC BLOOD PRESSURE: 115 MMHG | RESPIRATION RATE: 20 BRPM | HEIGHT: 65 IN | HEART RATE: 68 BPM | WEIGHT: 240 LBS | BODY MASS INDEX: 39.99 KG/M2 | TEMPERATURE: 98.7 F

## 2021-03-16 DIAGNOSIS — R07.89 CHEST WALL PAIN: Primary | ICD-10-CM

## 2021-03-16 DIAGNOSIS — T78.40XA ALLERGIC REACTION, INITIAL ENCOUNTER: ICD-10-CM

## 2021-03-16 LAB
ANION GAP SERPL CALCULATED.3IONS-SCNC: 11 MMOL/L (ref 7–16)
BACTERIA: ABNORMAL /HPF
BASOPHILS ABSOLUTE: 0.06 E9/L (ref 0–0.2)
BASOPHILS RELATIVE PERCENT: 0.5 % (ref 0–2)
BILIRUBIN URINE: NEGATIVE
BLOOD, URINE: NEGATIVE
BUN BLDV-MCNC: 12 MG/DL (ref 6–20)
CALCIUM SERPL-MCNC: 9.2 MG/DL (ref 8.6–10.2)
CHLORIDE BLD-SCNC: 101 MMOL/L (ref 98–107)
CLARITY: CLEAR
CO2: 25 MMOL/L (ref 22–29)
COLOR: YELLOW
CREAT SERPL-MCNC: 0.8 MG/DL (ref 0.5–1)
EKG ATRIAL RATE: 83 BPM
EKG P AXIS: 39 DEGREES
EKG P-R INTERVAL: 156 MS
EKG Q-T INTERVAL: 372 MS
EKG QRS DURATION: 84 MS
EKG QTC CALCULATION (BAZETT): 437 MS
EKG R AXIS: 54 DEGREES
EKG T AXIS: 47 DEGREES
EKG VENTRICULAR RATE: 83 BPM
EOSINOPHILS ABSOLUTE: 0.35 E9/L (ref 0.05–0.5)
EOSINOPHILS RELATIVE PERCENT: 2.8 % (ref 0–6)
EPITHELIAL CELLS, UA: ABNORMAL /HPF
GFR AFRICAN AMERICAN: >60
GFR NON-AFRICAN AMERICAN: >60 ML/MIN/1.73
GLUCOSE BLD-MCNC: 102 MG/DL (ref 74–99)
GLUCOSE URINE: NEGATIVE MG/DL
HCG, URINE, POC: NEGATIVE
HCT VFR BLD CALC: 39 % (ref 34–48)
HEMOGLOBIN: 12.9 G/DL (ref 11.5–15.5)
IMMATURE GRANULOCYTES #: 0.05 E9/L
IMMATURE GRANULOCYTES %: 0.4 % (ref 0–5)
KETONES, URINE: NEGATIVE MG/DL
LEUKOCYTE ESTERASE, URINE: ABNORMAL
LYMPHOCYTES ABSOLUTE: 3.43 E9/L (ref 1.5–4)
LYMPHOCYTES RELATIVE PERCENT: 27.1 % (ref 20–42)
Lab: NORMAL
MCH RBC QN AUTO: 30 PG (ref 26–35)
MCHC RBC AUTO-ENTMCNC: 33.1 % (ref 32–34.5)
MCV RBC AUTO: 90.7 FL (ref 80–99.9)
MONOCYTES ABSOLUTE: 0.61 E9/L (ref 0.1–0.95)
MONOCYTES RELATIVE PERCENT: 4.8 % (ref 2–12)
NEGATIVE QC PASS/FAIL: NORMAL
NEUTROPHILS ABSOLUTE: 8.15 E9/L (ref 1.8–7.3)
NEUTROPHILS RELATIVE PERCENT: 64.4 % (ref 43–80)
NITRITE, URINE: NEGATIVE
PDW BLD-RTO: 12.4 FL (ref 11.5–15)
PH UA: 5.5 (ref 5–9)
PLATELET # BLD: 372 E9/L (ref 130–450)
PMV BLD AUTO: 8.6 FL (ref 7–12)
POSITIVE QC PASS/FAIL: NORMAL
POTASSIUM REFLEX MAGNESIUM: 3.9 MMOL/L (ref 3.5–5)
PROTEIN UA: NEGATIVE MG/DL
RBC # BLD: 4.3 E12/L (ref 3.5–5.5)
RBC UA: ABNORMAL /HPF (ref 0–2)
SODIUM BLD-SCNC: 137 MMOL/L (ref 132–146)
SPECIFIC GRAVITY UA: >=1.03 (ref 1–1.03)
TROPONIN: <0.01 NG/ML (ref 0–0.03)
UROBILINOGEN, URINE: 0.2 E.U./DL
WBC # BLD: 12.7 E9/L (ref 4.5–11.5)
WBC UA: ABNORMAL /HPF (ref 0–5)

## 2021-03-16 PROCEDURE — 81001 URINALYSIS AUTO W/SCOPE: CPT

## 2021-03-16 PROCEDURE — 71045 X-RAY EXAM CHEST 1 VIEW: CPT

## 2021-03-16 PROCEDURE — 96374 THER/PROPH/DIAG INJ IV PUSH: CPT

## 2021-03-16 PROCEDURE — 6370000000 HC RX 637 (ALT 250 FOR IP): Performed by: STUDENT IN AN ORGANIZED HEALTH CARE EDUCATION/TRAINING PROGRAM

## 2021-03-16 PROCEDURE — 99284 EMERGENCY DEPT VISIT MOD MDM: CPT

## 2021-03-16 PROCEDURE — 84484 ASSAY OF TROPONIN QUANT: CPT

## 2021-03-16 PROCEDURE — 85025 COMPLETE CBC W/AUTO DIFF WBC: CPT

## 2021-03-16 PROCEDURE — 96375 TX/PRO/DX INJ NEW DRUG ADDON: CPT

## 2021-03-16 PROCEDURE — 80048 BASIC METABOLIC PNL TOTAL CA: CPT

## 2021-03-16 PROCEDURE — 6360000002 HC RX W HCPCS: Performed by: STUDENT IN AN ORGANIZED HEALTH CARE EDUCATION/TRAINING PROGRAM

## 2021-03-16 PROCEDURE — 93005 ELECTROCARDIOGRAM TRACING: CPT | Performed by: STUDENT IN AN ORGANIZED HEALTH CARE EDUCATION/TRAINING PROGRAM

## 2021-03-16 RX ORDER — HYDROCODONE BITARTRATE AND ACETAMINOPHEN 5; 325 MG/1; MG/1
2 TABLET ORAL ONCE
Status: COMPLETED | OUTPATIENT
Start: 2021-03-16 | End: 2021-03-16

## 2021-03-16 RX ORDER — PREDNISONE 10 MG/1
10 TABLET ORAL 2 TIMES DAILY
Qty: 6 TABLET | Refills: 0 | Status: SHIPPED | OUTPATIENT
Start: 2021-03-16 | End: 2021-03-16 | Stop reason: SDUPTHER

## 2021-03-16 RX ORDER — METHYLPREDNISOLONE SODIUM SUCCINATE 125 MG/2ML
80 INJECTION, POWDER, LYOPHILIZED, FOR SOLUTION INTRAMUSCULAR; INTRAVENOUS ONCE
Status: COMPLETED | OUTPATIENT
Start: 2021-03-16 | End: 2021-03-16

## 2021-03-16 RX ORDER — DIPHENHYDRAMINE HYDROCHLORIDE 50 MG/ML
25 INJECTION INTRAMUSCULAR; INTRAVENOUS ONCE
Status: COMPLETED | OUTPATIENT
Start: 2021-03-16 | End: 2021-03-16

## 2021-03-16 RX ORDER — DIPHENHYDRAMINE HCL 25 MG
25 CAPSULE ORAL EVERY 6 HOURS PRN
Qty: 12 CAPSULE | Refills: 0 | Status: SHIPPED | OUTPATIENT
Start: 2021-03-16 | End: 2021-10-29

## 2021-03-16 RX ORDER — KETOROLAC TROMETHAMINE 30 MG/ML
30 INJECTION, SOLUTION INTRAMUSCULAR; INTRAVENOUS ONCE
Status: COMPLETED | OUTPATIENT
Start: 2021-03-16 | End: 2021-03-16

## 2021-03-16 RX ORDER — PREDNISONE 10 MG/1
20 TABLET ORAL 2 TIMES DAILY
Qty: 20 TABLET | Refills: 0 | Status: SHIPPED | OUTPATIENT
Start: 2021-03-16 | End: 2021-03-21

## 2021-03-16 RX ADMIN — HYDROCODONE BITARTRATE AND ACETAMINOPHEN 2 TABLET: 5; 325 TABLET ORAL at 03:46

## 2021-03-16 RX ADMIN — DIPHENHYDRAMINE HYDROCHLORIDE 25 MG: 50 INJECTION, SOLUTION INTRAMUSCULAR; INTRAVENOUS at 04:38

## 2021-03-16 RX ADMIN — KETOROLAC TROMETHAMINE 30 MG: 30 INJECTION, SOLUTION INTRAMUSCULAR at 02:50

## 2021-03-16 RX ADMIN — METHYLPREDNISOLONE SODIUM SUCCINATE 80 MG: 125 INJECTION, POWDER, FOR SOLUTION INTRAMUSCULAR; INTRAVENOUS at 04:38

## 2021-03-16 ASSESSMENT — ENCOUNTER SYMPTOMS
NAUSEA: 0
DIARRHEA: 0
SHORTNESS OF BREATH: 0
VOMITING: 0
COUGH: 0
BACK PAIN: 0
SORE THROAT: 0
RHINORRHEA: 0
ABDOMINAL PAIN: 0

## 2021-03-16 ASSESSMENT — PAIN DESCRIPTION - PAIN TYPE: TYPE: ACUTE PAIN

## 2021-03-16 ASSESSMENT — PAIN DESCRIPTION - LOCATION: LOCATION: CHEST

## 2021-03-16 NOTE — ED PROVIDER NOTES
to chief complaint of substernal chest pain. On arrival patient's vitals are stable, she is AAO x4, answers questions and responds to examination appropriately; she is in no acute distress. On exam there is mild tenderness to palpation/pressure over mid sternum; there does not appear to be any rash, swelling, or deformity of her chest wall; she denies any trauma to the area; denies any difficulty breathing; lungs CTA bilaterally; abdomen soft and nontender. EKG shows NSR, no ST elevations; Troponin is negative; CXR unremarkable, labs generally unremarkable; urine pregnancy is negative. Patient was given IV Toradol with some improvement of symptoms however chest wall pain was still present; she was then given 10 mg PO Norco with further relief of symptoms however she began to have a possible allergic reaction after this; she reported feeling slightly itchy all over and her throat felt itchy/scratchy; she denied any difficulty breathing or difficulty swallowing; she was given IV Solu-Medrol and Benadryl with some relief of symptoms; she did not appear to have any rashes or skin changes anywhere; lungs remained CTA bilaterally. Chest pain remains reproducible to palpation, suspect likely musculoskeletal in origin; and her possible allergic reaction symptoms did improve; during ED observation patient did state she was ready to go home, feel this is appropriate and patient was provided prescriptions for prednisone and Benadryl and strict return precautions. She voices understanding and will follow up outpatient with her primary care provider. I have discussed this patient with my attending, who has seen the patient and agrees with this disposition. Patient was seen and evaluated by myself and my attending No att. providers found. Assessment and Plan discussed with attending provider, please see attestation for final plan of care.        ED Course as of Mar 16 1701   Tue Mar 16, 2021   1242 Patient reassessed s/p Toradol, her symptoms have minimally improved, she is resting comfortably and is in no acute distress; repeat exam of chest shows reproducible pain to palpation over sternum; PO Norco ordered. [VG]   [id number] Patient reassessed s/p p.o. Enrico Carrasquillo; she states that she feels slightly itchy all over and her throat feels scratchy; IV Benadryl and Solu-Medrol ordered. [VG]   0501 Patient reassessed s/p Benadryl and Solu-Medrol; her symptoms have improved; she has not had any difficulty breathing whatsoever, states that her throat does feel itchy; there are no skin eruptions or rashes noted anywhere; she does still have minimal chest pain but it is improved during this encounter, and remains reproducible to palpation; likely musculoskeletal in origin, troponin is negative, EKG is unremarkable; feel patient is appropriate for discharge with instructions for outpatient follow-up and return precautions. Will assess patient for about 1 more hour given possible allergic reaction in the department. [VG]   [id number] Patient states she has to go home now; discussed strict return precautions with her; will provide prescriptions for benadryl and prednisone.     [VG]      ED Course User Index  [VG] Katelynn Gillespie, DO       --------------------------------------------- PAST HISTORY ---------------------------------------------  Past Medical History:  has a past medical history of Asthma. Past Surgical History:  has a past surgical history that includes Tonsillectomy and  section (N/A, 2019). Social History:  reports that she has never smoked. She has never used smokeless tobacco. She reports that she does not drink alcohol or use drugs. Family History: family history is not on file. The patients home medications have been reviewed.     Allergies: Norco [hydrocodone-acetaminophen] and Penicillins    -------------------------------------------------- RESULTS -------------------------------------------------  Labs:  Results for orders placed or performed during the hospital encounter of 03/16/21   CBC Auto Differential   Result Value Ref Range    WBC 12.7 (H) 4.5 - 11.5 E9/L    RBC 4.30 3.50 - 5.50 E12/L    Hemoglobin 12.9 11.5 - 15.5 g/dL    Hematocrit 39.0 34.0 - 48.0 %    MCV 90.7 80.0 - 99.9 fL    MCH 30.0 26.0 - 35.0 pg    MCHC 33.1 32.0 - 34.5 %    RDW 12.4 11.5 - 15.0 fL    Platelets 252 615 - 057 E9/L    MPV 8.6 7.0 - 12.0 fL    Neutrophils % 64.4 43.0 - 80.0 %    Immature Granulocytes % 0.4 0.0 - 5.0 %    Lymphocytes % 27.1 20.0 - 42.0 %    Monocytes % 4.8 2.0 - 12.0 %    Eosinophils % 2.8 0.0 - 6.0 %    Basophils % 0.5 0.0 - 2.0 %    Neutrophils Absolute 8.15 (H) 1.80 - 7.30 E9/L    Immature Granulocytes # 0.05 E9/L    Lymphocytes Absolute 3.43 1.50 - 4.00 E9/L    Monocytes Absolute 0.61 0.10 - 0.95 E9/L    Eosinophils Absolute 0.35 0.05 - 0.50 E9/L    Basophils Absolute 0.06 0.00 - 0.20 N6/A   Basic Metabolic Panel w/ Reflex to MG   Result Value Ref Range    Sodium 137 132 - 146 mmol/L    Potassium reflex Magnesium 3.9 3.5 - 5.0 mmol/L    Chloride 101 98 - 107 mmol/L    CO2 25 22 - 29 mmol/L    Anion Gap 11 7 - 16 mmol/L    Glucose 102 (H) 74 - 99 mg/dL    BUN 12 6 - 20 mg/dL    CREATININE 0.8 0.5 - 1.0 mg/dL    GFR Non-African American >60 >=60 mL/min/1.73    GFR African American >60     Calcium 9.2 8.6 - 10.2 mg/dL   Troponin   Result Value Ref Range    Troponin <0.01 0.00 - 0.03 ng/mL   Urinalysis, reflex to microscopic   Result Value Ref Range    Color, UA Yellow Straw/Yellow    Clarity, UA Clear Clear    Glucose, Ur Negative Negative mg/dL    Bilirubin Urine Negative Negative    Ketones, Urine Negative Negative mg/dL    Specific Gravity, UA >=1.030 1.005 - 1.030    Blood, Urine Negative Negative    pH, UA 5.5 5.0 - 9.0    Protein, UA Negative Negative mg/dL    Urobilinogen, Urine 0.2 <2.0 E.U./dL    Nitrite, Urine Negative Negative    Leukocyte Esterase, Urine TRACE (A) Negative   Microscopic Urinalysis   Result Value Ref Range    WBC, UA 1-3 0 - 5 /HPF    RBC, UA NONE 0 - 2 /HPF    Epithelial Cells, UA FEW /HPF    Bacteria, UA RARE (A) None Seen /HPF   POC Pregnancy Urine   Result Value Ref Range    HCG, Urine, POC Negative Negative    Lot Number [id number]     Positive QC Pass/Fail Pass     Negative QC Pass/Fail Pass    EKG 12 Lead   Result Value Ref Range    Ventricular Rate 83 BPM    Atrial Rate 83 BPM    P-R Interval 156 ms    QRS Duration 84 ms    Q-T Interval 372 ms    QTc Calculation (Bazett) 437 ms    P Axis 39 degrees    R Axis 54 degrees    T Axis 47 degrees       Radiology:  XR CHEST PORTABLE   Final Result   Limited study by the patient's body habitus. No definite acute cardiopulmonary process. CT scan may be obtained if   clinical suspicion for airspace disease is high. EKG:  This EKG is signed and interpreted by the emergency department physician. Rate: 83  Rhythm: Sinus  Interpretation: Sinus, normal axis, normal [place], normal QRS, no QTc prolongation, no ST elevations  Comparison: no significant changes compared to previous EKG      ------------------------- NURSING NOTES AND VITALS REVIEWED ---------------------------  Date / Time Roomed:  3/16/2021  2:00 AM  ED Bed Assignment:  07/07    The nursing notes within the ED encounter and vital signs as below have been reviewed. /75   Pulse 68   Temp 98.7 °F (37.1 °C) (Oral)   Resp 20   Ht 5' 5\" (1.651 m)   Wt 240 lb (108.9 kg)   SpO2 100%   BMI 39.94 kg/m²   Oxygen Saturation Interpretation: Normal      ------------------------------------------ PROGRESS NOTES ------------------------------------------  2:07 AM EDT  I have spoken with the patient and discussed todays results, in addition to providing specific details for the plan of care and counseling regarding the diagnosis and prognosis.   Their questions are answered at this time and they are agreeable with the plan. I discussed at length with them reasons for immediate return here for re evaluation. They will followup with their primary care physician by calling their office tomorrow. --------------------------------- ADDITIONAL PROVIDER NOTES ---------------------------------  At this time the patient is without objective evidence of an acute process requiring hospitalization or inpatient management. They have remained hemodynamically stable throughout their entire ED visit and are stable for discharge with outpatient follow-up. The plan has been discussed in detail and they are aware of the specific conditions for emergent return, as well as the importance of follow-up. Discharge Medication List as of 3/16/2021  5:40 AM      START taking these medications    Details   diphenhydrAMINE (BENADRYL ALLERGY) 25 MG capsule Take 1 capsule by mouth every 6 hours as needed for Itching, Disp-12 capsule, R-0Print             Diagnosis:  1. Chest wall pain    2. Allergic reaction, initial encounter        Disposition:  Patient's disposition: Discharge to home  Patient's condition is stable.        Katelynn Gillespie, DO  Resident  03/16/21 [id number]

## 2021-03-16 NOTE — ED NOTES
This RN called to room. Pt. States she is itching and it is hard for her to swallow. Dr. Thompson.      Steph Navarro, EMILEE  03/16/21 [id number]

## 2021-03-16 NOTE — ED NOTES
Utilizing the [address] [facility] interperter. Pt.  Understood and accepted norco 2 tabs (PO).      Mile Love RN  03/16/21 7925

## 2021-03-16 NOTE — ED NOTES
Utilizing [address] [facility]  pt. Made aware that benadryl and prednisone would be given IVP for her complaints. Pt. Understood this.      Bertram Luther RN  03/16/21 8638

## 2021-06-13 ENCOUNTER — HOSPITAL ENCOUNTER (OUTPATIENT)
Dept: ULTRASOUND IMAGING | Age: 21
Discharge: HOME | End: 2021-06-13
Payer: MEDICAID

## 2021-06-13 DIAGNOSIS — R10.2 PERINEAL NEURALGIA, UNSPECIFIED LATERALITY: ICD-10-CM

## 2021-06-13 PROCEDURE — 76856 US EXAM PELVIC COMPLETE: CPT

## 2021-07-14 ENCOUNTER — HOSPITAL ENCOUNTER (EMERGENCY)
Age: 21
Discharge: HOME | End: 2021-07-14
Payer: MEDICAID

## 2021-07-14 ENCOUNTER — APPOINTMENT (OUTPATIENT)
Dept: ULTRASOUND IMAGING | Age: 21
End: 2021-07-14
Payer: MEDICAID

## 2021-07-14 VITALS
RESPIRATION RATE: 19 BRPM | OXYGEN SATURATION: 97 % | HEART RATE: 96 BPM | HEIGHT: 65 IN | WEIGHT: 240 LBS | BODY MASS INDEX: 39.99 KG/M2 | DIASTOLIC BLOOD PRESSURE: 71 MMHG | TEMPERATURE: 98.2 F | SYSTOLIC BLOOD PRESSURE: 153 MMHG

## 2021-07-14 DIAGNOSIS — N93.8 DYSFUNCTIONAL UTERINE BLEEDING: Primary | ICD-10-CM

## 2021-07-14 DIAGNOSIS — N30.00 ACUTE CYSTITIS WITHOUT HEMATURIA: ICD-10-CM

## 2021-07-14 LAB
ALBUMIN SERPL-MCNC: 4 G/DL (ref 3.5–5.2)
ALP BLD-CCNC: 83 U/L (ref 35–104)
ALT SERPL-CCNC: 36 U/L (ref 0–32)
ANION GAP SERPL CALCULATED.3IONS-SCNC: 9 MMOL/L (ref 7–16)
AST SERPL-CCNC: 26 U/L (ref 0–31)
BACTERIA: ABNORMAL /HPF
BASOPHILS ABSOLUTE: 0.06 E9/L (ref 0–0.2)
BASOPHILS RELATIVE PERCENT: 0.6 % (ref 0–2)
BILIRUB SERPL-MCNC: <0.2 MG/DL (ref 0–1.2)
BILIRUBIN URINE: ABNORMAL
BLOOD, URINE: ABNORMAL
BUN BLDV-MCNC: 10 MG/DL (ref 6–20)
CALCIUM SERPL-MCNC: 9.1 MG/DL (ref 8.6–10.2)
CHLORIDE BLD-SCNC: 102 MMOL/L (ref 98–107)
CLARITY: ABNORMAL
CO2: 25 MMOL/L (ref 22–29)
COLOR: ABNORMAL
CREAT SERPL-MCNC: 0.8 MG/DL (ref 0.5–1)
EOSINOPHILS ABSOLUTE: 0.5 E9/L (ref 0.05–0.5)
EOSINOPHILS RELATIVE PERCENT: 4.9 % (ref 0–6)
GFR AFRICAN AMERICAN: >60
GFR NON-AFRICAN AMERICAN: >60 ML/MIN/1.73
GLUCOSE BLD-MCNC: 97 MG/DL (ref 74–99)
GLUCOSE URINE: NEGATIVE MG/DL
HCG, URINE, POC: NEGATIVE
HCT VFR BLD CALC: 38.3 % (ref 34–48)
HEMOGLOBIN: 12.6 G/DL (ref 11.5–15.5)
IMMATURE GRANULOCYTES #: 0.06 E9/L
IMMATURE GRANULOCYTES %: 0.6 % (ref 0–5)
KETONES, URINE: ABNORMAL MG/DL
LEUKOCYTE ESTERASE, URINE: ABNORMAL
LYMPHOCYTES ABSOLUTE: 2.43 E9/L (ref 1.5–4)
LYMPHOCYTES RELATIVE PERCENT: 23.7 % (ref 20–42)
Lab: NORMAL
MCH RBC QN AUTO: 29.9 PG (ref 26–35)
MCHC RBC AUTO-ENTMCNC: 32.9 % (ref 32–34.5)
MCV RBC AUTO: 91 FL (ref 80–99.9)
MONOCYTES ABSOLUTE: 0.64 E9/L (ref 0.1–0.95)
MONOCYTES RELATIVE PERCENT: 6.3 % (ref 2–12)
NEGATIVE QC PASS/FAIL: NORMAL
NEUTROPHILS ABSOLUTE: 6.55 E9/L (ref 1.8–7.3)
NEUTROPHILS RELATIVE PERCENT: 63.9 % (ref 43–80)
NITRITE, URINE: POSITIVE
PDW BLD-RTO: 12.3 FL (ref 11.5–15)
PH UA: 5.5 (ref 5–9)
PLATELET # BLD: 379 E9/L (ref 130–450)
PMV BLD AUTO: 8.5 FL (ref 7–12)
POSITIVE QC PASS/FAIL: NORMAL
POTASSIUM REFLEX MAGNESIUM: 4.1 MMOL/L (ref 3.5–5)
PROTEIN UA: 100 MG/DL
RBC # BLD: 4.21 E12/L (ref 3.5–5.5)
RBC UA: ABNORMAL /HPF (ref 0–2)
SODIUM BLD-SCNC: 136 MMOL/L (ref 132–146)
SPECIFIC GRAVITY UA: >=1.03 (ref 1–1.03)
TOTAL PROTEIN: 7.3 G/DL (ref 6.4–8.3)
UROBILINOGEN, URINE: 1 E.U./DL
WBC # BLD: 10.2 E9/L (ref 4.5–11.5)
WBC UA: ABNORMAL /HPF (ref 0–5)

## 2021-07-14 PROCEDURE — 80053 COMPREHEN METABOLIC PANEL: CPT

## 2021-07-14 PROCEDURE — 76830 TRANSVAGINAL US NON-OB: CPT

## 2021-07-14 PROCEDURE — 96365 THER/PROPH/DIAG IV INF INIT: CPT

## 2021-07-14 PROCEDURE — 81001 URINALYSIS AUTO W/SCOPE: CPT

## 2021-07-14 PROCEDURE — 6360000002 HC RX W HCPCS: Performed by: PHYSICIAN ASSISTANT

## 2021-07-14 PROCEDURE — 99284 EMERGENCY DEPT VISIT MOD MDM: CPT

## 2021-07-14 PROCEDURE — 6370000000 HC RX 637 (ALT 250 FOR IP): Performed by: PHYSICIAN ASSISTANT

## 2021-07-14 PROCEDURE — 2580000003 HC RX 258: Performed by: PHYSICIAN ASSISTANT

## 2021-07-14 PROCEDURE — 85025 COMPLETE CBC W/AUTO DIFF WBC: CPT

## 2021-07-14 PROCEDURE — 36415 COLL VENOUS BLD VENIPUNCTURE: CPT

## 2021-07-14 PROCEDURE — 76856 US EXAM PELVIC COMPLETE: CPT

## 2021-07-14 RX ORDER — DICYCLOMINE HYDROCHLORIDE 10 MG/1
10 CAPSULE ORAL 4 TIMES DAILY
Qty: 20 CAPSULE | Refills: 0 | Status: SHIPPED | OUTPATIENT
Start: 2021-07-14 | End: 2021-10-29

## 2021-07-14 RX ORDER — GUAIFENESIN 600 MG/1
600 TABLET, EXTENDED RELEASE ORAL 2 TIMES DAILY
Qty: 30 TABLET | Refills: 0 | Status: SHIPPED | OUTPATIENT
Start: 2021-07-14 | End: 2021-07-29

## 2021-07-14 RX ORDER — CEPHALEXIN 500 MG/1
500 CAPSULE ORAL 3 TIMES DAILY
Qty: 21 CAPSULE | Refills: 0 | Status: SHIPPED | OUTPATIENT
Start: 2021-07-14 | End: 2021-07-21

## 2021-07-14 RX ORDER — DICYCLOMINE HYDROCHLORIDE 10 MG/1
10 CAPSULE ORAL ONCE
Status: COMPLETED | OUTPATIENT
Start: 2021-07-14 | End: 2021-07-14

## 2021-07-14 RX ADMIN — CEFTRIAXONE SODIUM 1000 MG: 1 INJECTION, POWDER, FOR SOLUTION INTRAMUSCULAR; INTRAVENOUS at 22:46

## 2021-07-14 RX ADMIN — DICYCLOMINE HYDROCHLORIDE 10 MG: 10 CAPSULE ORAL at 22:47

## 2021-07-14 ASSESSMENT — PAIN DESCRIPTION - LOCATION: LOCATION: ABDOMEN

## 2021-07-14 ASSESSMENT — PAIN DESCRIPTION - PAIN TYPE: TYPE: ACUTE PAIN

## 2021-07-14 ASSESSMENT — PAIN SCALES - GENERAL: PAINLEVEL_OUTOF10: 8

## 2021-07-14 NOTE — ED PROVIDER NOTES
hospital encounter of 07/14/21   CBC Auto Differential   Result Value Ref Range    WBC 10.2 4.5 - 11.5 E9/L    RBC 4.21 3.50 - 5.50 E12/L    Hemoglobin 12.6 11.5 - 15.5 g/dL    Hematocrit 38.3 34.0 - 48.0 %    MCV 91.0 80.0 - 99.9 fL    MCH 29.9 26.0 - 35.0 pg    MCHC 32.9 32.0 - 34.5 %    RDW 12.3 11.5 - 15.0 fL    Platelets 431 245 - 327 E9/L    MPV 8.5 7.0 - 12.0 fL    Neutrophils % 63.9 43.0 - 80.0 %    Immature Granulocytes % 0.6 0.0 - 5.0 %    Lymphocytes % 23.7 20.0 - 42.0 %    Monocytes % 6.3 2.0 - 12.0 %    Eosinophils % 4.9 0.0 - 6.0 %    Basophils % 0.6 0.0 - 2.0 %    Neutrophils Absolute 6.55 1.80 - 7.30 E9/L    Immature Granulocytes # 0.06 E9/L    Lymphocytes Absolute 2.43 1.50 - 4.00 E9/L    Monocytes Absolute 0.64 0.10 - 0.95 E9/L    Eosinophils Absolute 0.50 0.05 - 0.50 E9/L    Basophils Absolute 0.06 0.00 - 0.20 E9/L   Comprehensive Metabolic Panel w/ Reflex to MG   Result Value Ref Range    Sodium 136 132 - 146 mmol/L    Potassium reflex Magnesium 4.1 3.5 - 5.0 mmol/L    Chloride 102 98 - 107 mmol/L    CO2 25 22 - 29 mmol/L    Anion Gap 9 7 - 16 mmol/L    Glucose 97 74 - 99 mg/dL    BUN 10 6 - 20 mg/dL    CREATININE 0.8 0.5 - 1.0 mg/dL    GFR Non-African American >60 >=60 mL/min/1.73    GFR African American >60     Calcium 9.1 8.6 - 10.2 mg/dL    Total Protein 7.3 6.4 - 8.3 g/dL    Albumin 4.0 3.5 - 5.2 g/dL    Total Bilirubin <0.2 0.0 - 1.2 mg/dL    Alkaline Phosphatase 83 35 - 104 U/L    ALT 36 (H) 0 - 32 U/L    AST 26 0 - 31 U/L   Urinalysis, reflex to microscopic   Result Value Ref Range    Color, UA RED (A) Straw/Yellow    Clarity, UA CLOUDY (A) Clear    Glucose, Ur Negative Negative mg/dL    Bilirubin Urine SMALL (A) Negative    Ketones, Urine TRACE (A) Negative mg/dL    Specific Gravity, UA >=1.030 1.005 - 1.030    Blood, Urine LARGE (A) Negative    pH, UA 5.5 5.0 - 9.0    Protein,  (A) Negative mg/dL    Urobilinogen, Urine 1.0 <2.0 E.U./dL    Nitrite, Urine POSITIVE (A) Negative Leukocyte Esterase, Urine TRACE (A) Negative   Microscopic Urinalysis   Result Value Ref Range    WBC, UA 0-1 0 - 5 /HPF    RBC, UA PACKED 0 - 2 /HPF    Bacteria, UA FEW (A) None Seen /HPF   POC Pregnancy Urine   Result Value Ref Range    HCG, Urine, POC Negative Negative    Lot Number [id number]     Positive QC Pass/Fail Pass     Negative QC Pass/Fail Pass        RADIOLOGY:  Interpreted by Radiologist.  US PELVIS COMPLETE   Final Result   Unremarkable pelvic ultrasound. US NON OB TRANSVAGINAL   Final Result   Unremarkable pelvic ultrasound. No Doppler evidence of ovarian torsion.             ------------------------- NURSING NOTES AND VITALS REVIEWED ---------------------------   The nursing notes within the ED encounter and vital signs as below have been reviewed. BP (!) 153/71   Pulse 96   Temp 98.2 °F (36.8 °C) (Temporal)   Resp 19   Ht 5' 5\" (1.651 m)   Wt 240 lb (108.9 kg)   LMP 03/14/2021   SpO2 97%   BMI 39.94 kg/m²   Oxygen Saturation Interpretation: Normal      ---------------------------------------------------PHYSICAL EXAM--------------------------------------      Constitutional/General: Alert and oriented x3, well appearing, non toxic in NAD  Head: Normocephalic and atraumatic  Eyes: PERRL, EOMI  Mouth: Oropharynx clear, handling secretions, no trismus  Neck: Supple, full ROM,   Pulmonary: Lungs clear to auscultation bilaterally, no wheezes, rales, or rhonchi. Not in respiratory distress  Cardiovascular:  Regular rate and rhythm, no murmurs, gallops, or rubs. 2+ distal pulses  Abdomen: Soft, non tender, non distended,   Extremities: Moves all extremities x 4.  Warm and well perfused  Skin: warm and dry without rash  Neurologic: GCS 15,  Psych: Normal Affect      ------------------------------ ED COURSE/MEDICAL DECISION MAKING----------------------  Medications   cefTRIAXone (ROCEPHIN) 1,000 mg in sterile water 10 mL IV syringe (has no administration in time range)   dicyclomine (BENTYL) capsule 10 mg (has no administration in time range)         ED COURSE:       Medical Decision Making:    Patient is a 79-year-old female presented emergency department lower abdominal pain and vaginal bleeding. Ultrasound is revealing acute pathology. Lab work is stable. Patient was found have urinary tract infection. At this time I understand biotics for the UTI as well as symptomatically treat. She is to follow-up with PCP for recheck return to the emergency department with any new or worsening symptoms. Patient voiced understanding is agreeable to the above treatment plan. Counseling: The emergency provider has spoken with the patient and discussed todays results, in addition to providing specific details for the plan of care and counseling regarding the diagnosis and prognosis. Questions are answered at this time and they are agreeable with the plan.      --------------------------------- IMPRESSION AND DISPOSITION ---------------------------------    IMPRESSION  1. Dysfunctional uterine bleeding    2. Acute cystitis without hematuria        DISPOSITION  Disposition: Discharge to home  Patient condition is stable      NOTE: This report was transcribed using voice recognition software.  Every effort was made to ensure accuracy; however, inadvertent computerized transcription errors may be present        Ninoska Frost  07/14/21 1443

## 2021-10-29 ENCOUNTER — APPOINTMENT (OUTPATIENT)
Dept: GENERAL RADIOLOGY | Age: 21
End: 2021-10-29
Payer: MEDICAID

## 2021-10-29 ENCOUNTER — APPOINTMENT (OUTPATIENT)
Dept: CT IMAGING | Age: 21
End: 2021-10-29
Payer: MEDICAID

## 2021-10-29 ENCOUNTER — HOSPITAL ENCOUNTER (EMERGENCY)
Age: 21
Discharge: HOME | End: 2021-10-30
Attending: EMERGENCY MEDICINE
Payer: MEDICAID

## 2021-10-29 VITALS
DIASTOLIC BLOOD PRESSURE: 70 MMHG | SYSTOLIC BLOOD PRESSURE: 101 MMHG | OXYGEN SATURATION: 100 % | TEMPERATURE: 98.2 F | HEART RATE: 119 BPM

## 2021-10-29 DIAGNOSIS — R13.10 DYSPHAGIA, UNSPECIFIED TYPE: Primary | ICD-10-CM

## 2021-10-29 LAB
ALBUMIN SERPL-MCNC: 4.6 G/DL (ref 3.5–5.2)
ALP BLD-CCNC: 80 U/L (ref 35–104)
ALT SERPL-CCNC: 86 U/L (ref 0–32)
ANION GAP SERPL CALCULATED.3IONS-SCNC: 15 MMOL/L (ref 7–16)
AST SERPL-CCNC: 47 U/L (ref 0–31)
BASOPHILS ABSOLUTE: 0.06 E9/L (ref 0–0.2)
BASOPHILS RELATIVE PERCENT: 0.4 % (ref 0–2)
BILIRUB SERPL-MCNC: 0.3 MG/DL (ref 0–1.2)
BUN BLDV-MCNC: 9 MG/DL (ref 6–20)
CALCIUM SERPL-MCNC: 10.1 MG/DL (ref 8.6–10.2)
CHLORIDE BLD-SCNC: 100 MMOL/L (ref 98–107)
CO2: 24 MMOL/L (ref 22–29)
CREAT SERPL-MCNC: 0.8 MG/DL (ref 0.5–1)
D DIMER: 884 NG/ML DDU
EOSINOPHILS ABSOLUTE: 0.56 E9/L (ref 0.05–0.5)
EOSINOPHILS RELATIVE PERCENT: 4.2 % (ref 0–6)
GFR AFRICAN AMERICAN: >60
GFR NON-AFRICAN AMERICAN: >60 ML/MIN/1.73
GLUCOSE BLD-MCNC: 86 MG/DL (ref 74–99)
HCG, URINE, POC: NEGATIVE
HCT VFR BLD CALC: 37.3 % (ref 34–48)
HEMOGLOBIN: 12 G/DL (ref 11.5–15.5)
IMMATURE GRANULOCYTES #: 0.1 E9/L
IMMATURE GRANULOCYTES %: 0.7 % (ref 0–5)
LACTIC ACID: 0.9 MMOL/L (ref 0.5–2.2)
LIPASE: 29 U/L (ref 13–60)
LYMPHOCYTES ABSOLUTE: 2.69 E9/L (ref 1.5–4)
LYMPHOCYTES RELATIVE PERCENT: 20.1 % (ref 20–42)
Lab: NORMAL
MCH RBC QN AUTO: 27.6 PG (ref 26–35)
MCHC RBC AUTO-ENTMCNC: 32.2 % (ref 32–34.5)
MCV RBC AUTO: 85.7 FL (ref 80–99.9)
MONOCYTES ABSOLUTE: 0.64 E9/L (ref 0.1–0.95)
MONOCYTES RELATIVE PERCENT: 4.8 % (ref 2–12)
NEGATIVE QC PASS/FAIL: NORMAL
NEUTROPHILS ABSOLUTE: 9.35 E9/L (ref 1.8–7.3)
NEUTROPHILS RELATIVE PERCENT: 69.8 % (ref 43–80)
PDW BLD-RTO: 12.9 FL (ref 11.5–15)
PLATELET # BLD: 501 E9/L (ref 130–450)
PMV BLD AUTO: 8.6 FL (ref 7–12)
POSITIVE QC PASS/FAIL: NORMAL
POTASSIUM REFLEX MAGNESIUM: 3.9 MMOL/L (ref 3.5–5)
RBC # BLD: 4.35 E12/L (ref 3.5–5.5)
SODIUM BLD-SCNC: 139 MMOL/L (ref 132–146)
TOTAL PROTEIN: 8.2 G/DL (ref 6.4–8.3)
TROPONIN, HIGH SENSITIVITY: <6 NG/L (ref 0–9)
WBC # BLD: 13.4 E9/L (ref 4.5–11.5)

## 2021-10-29 PROCEDURE — 85025 COMPLETE CBC W/AUTO DIFF WBC: CPT

## 2021-10-29 PROCEDURE — 83605 ASSAY OF LACTIC ACID: CPT

## 2021-10-29 PROCEDURE — 71046 X-RAY EXAM CHEST 2 VIEWS: CPT

## 2021-10-29 PROCEDURE — 93005 ELECTROCARDIOGRAM TRACING: CPT | Performed by: PHYSICIAN ASSISTANT

## 2021-10-29 PROCEDURE — 6370000000 HC RX 637 (ALT 250 FOR IP): Performed by: EMERGENCY MEDICINE

## 2021-10-29 PROCEDURE — 83690 ASSAY OF LIPASE: CPT

## 2021-10-29 PROCEDURE — 84484 ASSAY OF TROPONIN QUANT: CPT

## 2021-10-29 PROCEDURE — 80053 COMPREHEN METABOLIC PANEL: CPT

## 2021-10-29 PROCEDURE — 71275 CT ANGIOGRAPHY CHEST: CPT

## 2021-10-29 PROCEDURE — 85378 FIBRIN DEGRADE SEMIQUANT: CPT

## 2021-10-29 PROCEDURE — 6360000004 HC RX CONTRAST MEDICATION: Performed by: RADIOLOGY

## 2021-10-29 PROCEDURE — 99283 EMERGENCY DEPT VISIT LOW MDM: CPT

## 2021-10-29 RX ORDER — 0.9 % SODIUM CHLORIDE 0.9 %
1000 INTRAVENOUS SOLUTION INTRAVENOUS ONCE
Status: DISCONTINUED | OUTPATIENT
Start: 2021-10-29 | End: 2021-10-30 | Stop reason: HOSPADM

## 2021-10-29 RX ADMIN — LIDOCAINE HYDROCHLORIDE: 20 SOLUTION ORAL; TOPICAL at 23:16

## 2021-10-29 RX ADMIN — IOPAMIDOL 75 ML: 755 INJECTION, SOLUTION INTRAVENOUS at 22:52

## 2021-10-29 ASSESSMENT — ENCOUNTER SYMPTOMS
CHEST TIGHTNESS: 0
VOMITING: 0
SHORTNESS OF BREATH: 0
NAUSEA: 0
ABDOMINAL PAIN: 0

## 2021-10-29 NOTE — ED NOTES
FIRST PROVIDER CONTACT ASSESSMENT NOTE           Department of Emergency Medicine                 First Provider Note            10/29/21  7:31 PM EDT    Date of Encounter: No admission date for patient encounter. Patient Name: Corona Morris  : 2000  MRN: [id number]    Chief Complaint: No chief complaint on file. History of Present Illness:   Adam Pierre is a 24 y.o. female who presents to the ED for  chest pain history of gastric sleeve on the      Focused Physical Exam:  VS:    ED Triage Vitals [10/29/21 1929]   BP Temp Temp Source Pulse Resp SpO2 Height Weight   -- 98.2 °F (36.8 °C) Infrared 119 -- 100 % -- --        Physical Ex: Constitutional: Alert and non-toxic. Heart rrr   Lungs clear      Medical History:  has a past medical history of Asthma. Surgical History:  has a past surgical history that includes Tonsillectomy and  section (N/A, 2019). Social History:  reports that she has never smoked. She has never used smokeless tobacco. She reports that she does not drink alcohol and does not use drugs. Family History: family history is not on file.     Allergies: Norco [hydrocodone-acetaminophen] and Penicillins     Initial Plan of Care: Initiate Treatment-Testing, Proceed toTreatment Area When Bed Available for ED Attending/MLP to Continue Care      ---END OF FIRST PROVIDER CONTACT ASSESSMENT NOTE---  Electronically signed by ABDIAZIZ Noble   DD: 10/29/21     ABDIAZIZ Noble  10/29/21 1934

## 2021-10-30 LAB
EKG ATRIAL RATE: 104 BPM
EKG P AXIS: 31 DEGREES
EKG P-R INTERVAL: 130 MS
EKG Q-T INTERVAL: 342 MS
EKG QRS DURATION: 84 MS
EKG QTC CALCULATION (BAZETT): 449 MS
EKG R AXIS: 59 DEGREES
EKG T AXIS: 0 DEGREES
EKG VENTRICULAR RATE: 104 BPM

## 2021-10-30 RX ORDER — FAMOTIDINE 20 MG/1
20 TABLET, FILM COATED ORAL 2 TIMES DAILY
Qty: 60 TABLET | Refills: 0 | Status: SHIPPED | OUTPATIENT
Start: 2021-10-30 | End: 2022-06-15

## 2021-10-30 NOTE — ED PROVIDER NOTES
74-year-old female presenting from home with 2 days of chest discomfort. She says it comes and goes and its sometimes worse when she eats or drinks something. She had gastric sleeve surgery about 10 days ago up in [facility] [employer] [facility] [employer]. She was given Lovenox injections but she has not been taking them. She does not feel short of breath, no chest pain currently, no cardiac disease, no high blood pressure and no other risk factors other than the surgery recently. Is awake, alert, oriented x4, no hypoxia, no respiratory distress, no dyspnea. Gradual onset, intermittent, mild severity, 2 to 3 days duration, associate with a recent surgery. No family history on file. Past Surgical History:   Procedure Laterality Date     SECTION N/A 2019     SECTION performed by Dariaan Calix MD at [facility] L[facility]    TONSILLECTOMY         Review of Systems   Constitutional: Negative for chills and fever. Respiratory: Negative for chest tightness and shortness of breath. Cardiovascular: Positive for chest pain. Gastrointestinal: Negative for abdominal pain, nausea and vomiting. Esophageal pain   All other systems reviewed and are negative. Physical Exam  Constitutional:       General: She is not in acute distress. Appearance: She is well-developed. HENT:      Head: Normocephalic and atraumatic. Eyes:      Conjunctiva/sclera: Conjunctivae normal.      Pupils: Pupils are equal, round, and reactive to light. Neck:      Thyroid: No thyromegaly. Cardiovascular:      Rate and Rhythm: Normal rate and regular rhythm. Pulmonary:      Effort: Pulmonary effort is normal. No respiratory distress. Breath sounds: Normal breath sounds. Abdominal:      General: There is no distension. Palpations: Abdomen is soft. Tenderness: There is no abdominal tenderness. There is no guarding or rebound. Musculoskeletal:         General: No tenderness. Normal range of motion.       Cervical back: Normal range of motion. Skin:     General: Skin is warm and dry. Findings: No erythema. Neurological:      Mental Status: She is alert and oriented to person, place, and time. Cranial Nerves: No cranial nerve deficit. Coordination: Coordination normal.          Procedures     MDM   EKG: Interpreted by me  Sinus tachycardia, rate of 104, normal axis, no ST elevations or depressions, no T wave abnormalities, no signs of right heart strain or bundle-branch blocks. S1Q3T3 present.         --------------------------------------------- PAST HISTORY ---------------------------------------------  Past Medical History:  has a past medical history of Asthma. Past Surgical History:  has a past surgical history that includes Tonsillectomy and  section (N/A, 2019). Social History:  reports that she has never smoked. She has never used smokeless tobacco. She reports that she does not drink alcohol and does not use drugs. Family History: family history is not on file. The patients home medications have been reviewed.     Allergies: Norco [hydrocodone-acetaminophen] and Penicillins    -------------------------------------------------- RESULTS -------------------------------------------------  Labs:  Results for orders placed or performed during the hospital encounter of 10/29/21   CBC auto differential   Result Value Ref Range    WBC 13.4 (H) 4.5 - 11.5 E9/L    RBC 4.35 3.50 - 5.50 E12/L    Hemoglobin 12.0 11.5 - 15.5 g/dL    Hematocrit 37.3 34.0 - 48.0 %    MCV 85.7 80.0 - 99.9 fL    MCH 27.6 26.0 - 35.0 pg    MCHC 32.2 32.0 - 34.5 %    RDW 12.9 11.5 - 15.0 fL    Platelets 731 (H) 527 - 450 E9/L    MPV 8.6 7.0 - 12.0 fL    Neutrophils % 69.8 43.0 - 80.0 %    Immature Granulocytes % 0.7 0.0 - 5.0 %    Lymphocytes % 20.1 20.0 - 42.0 %    Monocytes % 4.8 2.0 - 12.0 %    Eosinophils % 4.2 0.0 - 6.0 %    Basophils % 0.4 0.0 - 2.0 %    Neutrophils Absolute 9.35 (H) 1.80 - 7.30 E9/L Immature Granulocytes # 0.10 E9/L    Lymphocytes Absolute 2.69 1.50 - 4.00 E9/L    Monocytes Absolute 0.64 0.10 - 0.95 E9/L    Eosinophils Absolute 0.56 (H) 0.05 - 0.50 E9/L    Basophils Absolute 0.06 0.00 - 0.20 E9/L   Comprehensive Metabolic Panel w/ Reflex to MG   Result Value Ref Range    Sodium 139 132 - 146 mmol/L    Potassium reflex Magnesium 3.9 3.5 - 5.0 mmol/L    Chloride 100 98 - 107 mmol/L    CO2 24 22 - 29 mmol/L    Anion Gap 15 7 - 16 mmol/L    Glucose 86 74 - 99 mg/dL    BUN 9 6 - 20 mg/dL    CREATININE 0.8 0.5 - 1.0 mg/dL    GFR Non-African American >60 >=60 mL/min/1.73    GFR African American >60     Calcium 10.1 8.6 - 10.2 mg/dL    Total Protein 8.2 6.4 - 8.3 g/dL    Albumin 4.6 3.5 - 5.2 g/dL    Total Bilirubin 0.3 0.0 - 1.2 mg/dL    Alkaline Phosphatase 80 35 - 104 U/L    ALT 86 (H) 0 - 32 U/L    AST 47 (H) 0 - 31 U/L   Troponin   Result Value Ref Range    Troponin, High Sensitivity <6 0 - 9 ng/L   D-Dimer, Quantitative   Result Value Ref Range    D-Dimer, Quant 884 ng/mL DDU   Lipase   Result Value Ref Range    Lipase 29 13 - 60 U/L   Lactic Acid, Plasma   Result Value Ref Range    Lactic Acid 0.9 0.5 - 2.2 mmol/L   POC Pregnancy Urine Qual   Result Value Ref Range    HCG, Urine, POC Negative Negative    Lot Number [id number]     Positive QC Pass/Fail Pass     Negative QC Pass/Fail Pass    EKG 12 Lead   Result Value Ref Range    Ventricular Rate 104 BPM    Atrial Rate 104 BPM    P-R Interval 130 ms    QRS Duration 84 ms    Q-T Interval 342 ms    QTc Calculation (Bazett) 449 ms    P Axis 31 degrees    R Axis 59 degrees    T Axis 0 degrees       Radiology:  CTA CHEST W CONTRAST   Final Result   1. No evidence of pulmonary embolism. No evidence of right heart strain. No acute cardiopulmonary pathology. 2.  Postprocedural changes partially imaged about the stomach suggestive of   gastric sleeve surgery. No complications in this region. 3.  Hepatic steatosis.       4.  Remainder of the study is as above. XR CHEST (2 VW)   Final Result   No acute process. ------------------------- NURSING NOTES AND VITALS REVIEWED ---------------------------  Date / Time Roomed:  10/29/2021  9:28 PM  ED Bed Assignment:  [id number]    The nursing notes within the ED encounter and vital signs as below have been reviewed. /70   Pulse 119   Temp 98.2 °F (36.8 °C) (Infrared)   LMP 10/20/2021   SpO2 100%   Oxygen Saturation Interpretation: Normal      ------------------------------------------ PROGRESS NOTES ------------------------------------------  I have spoken with the patient and discussed todays results, in addition to providing specific details for the plan of care and counseling regarding the diagnosis and prognosis. Their questions are answered at this time and they are agreeable with the plan. I discussed at length with them reasons for immediate return here for re evaluation. They will followup with primary care by calling their office tomorrow. --------------------------------- ADDITIONAL PROVIDER NOTES ---------------------------------  At this time the patient is without objective evidence of an acute process requiring hospitalization or inpatient management. They have remained hemodynamically stable throughout their entire ED visit and are stable for discharge with outpatient follow-up. The plan has been discussed in detail and they are aware of the specific conditions for emergent return, as well as the importance of follow-up. Discharge Medication List as of 10/30/2021  1:05 AM          Diagnosis:  1. Dysphagia, unspecified type        Disposition:  Patient's disposition: Discharge to home  Patient's condition is stable.            Raymond Isabel DO  11/01/21 0221

## 2021-12-26 ENCOUNTER — APPOINTMENT (OUTPATIENT)
Dept: GENERAL RADIOLOGY | Age: 21
End: 2021-12-26
Payer: MEDICAID

## 2021-12-26 ENCOUNTER — HOSPITAL ENCOUNTER (EMERGENCY)
Age: 21
Discharge: HOME | End: 2021-12-26
Payer: MEDICAID

## 2021-12-26 VITALS
OXYGEN SATURATION: 98 % | DIASTOLIC BLOOD PRESSURE: 83 MMHG | RESPIRATION RATE: 18 BRPM | HEART RATE: 95 BPM | SYSTOLIC BLOOD PRESSURE: 126 MMHG | TEMPERATURE: 96.8 F

## 2021-12-26 DIAGNOSIS — U07.1 COVID-19: Primary | ICD-10-CM

## 2021-12-26 LAB
ALBUMIN SERPL-MCNC: 4.4 G/DL (ref 3.5–5.2)
ALP BLD-CCNC: 72 U/L (ref 35–104)
ALT SERPL-CCNC: 26 U/L (ref 0–32)
ANION GAP SERPL CALCULATED.3IONS-SCNC: 12 MMOL/L (ref 7–16)
AST SERPL-CCNC: 24 U/L (ref 0–31)
BASOPHILS ABSOLUTE: 0.03 E9/L (ref 0–0.2)
BASOPHILS RELATIVE PERCENT: 0.9 % (ref 0–2)
BILIRUB SERPL-MCNC: 0.2 MG/DL (ref 0–1.2)
BUN BLDV-MCNC: 8 MG/DL (ref 6–20)
CALCIUM SERPL-MCNC: 9.3 MG/DL (ref 8.6–10.2)
CHLORIDE BLD-SCNC: 102 MMOL/L (ref 98–107)
CO2: 26 MMOL/L (ref 22–29)
CREAT SERPL-MCNC: 0.8 MG/DL (ref 0.5–1)
EOSINOPHILS ABSOLUTE: 0.05 E9/L (ref 0.05–0.5)
EOSINOPHILS RELATIVE PERCENT: 1.4 % (ref 0–6)
GFR AFRICAN AMERICAN: >60
GFR NON-AFRICAN AMERICAN: >60 ML/MIN/1.73
GLUCOSE BLD-MCNC: 79 MG/DL (ref 74–99)
HCT VFR BLD CALC: 35.1 % (ref 34–48)
HEMOGLOBIN: 10.8 G/DL (ref 11.5–15.5)
IMMATURE GRANULOCYTES #: 0.01 E9/L
IMMATURE GRANULOCYTES %: 0.3 % (ref 0–5)
LYMPHOCYTES ABSOLUTE: 1.29 E9/L (ref 1.5–4)
LYMPHOCYTES RELATIVE PERCENT: 37.1 % (ref 20–42)
MCH RBC QN AUTO: 27.2 PG (ref 26–35)
MCHC RBC AUTO-ENTMCNC: 30.8 % (ref 32–34.5)
MCV RBC AUTO: 88.4 FL (ref 80–99.9)
MONOCYTES ABSOLUTE: 0.43 E9/L (ref 0.1–0.95)
MONOCYTES RELATIVE PERCENT: 12.4 % (ref 2–12)
NEUTROPHILS ABSOLUTE: 1.67 E9/L (ref 1.8–7.3)
NEUTROPHILS RELATIVE PERCENT: 47.9 % (ref 43–80)
PDW BLD-RTO: 14.7 FL (ref 11.5–15)
PLATELET # BLD: 301 E9/L (ref 130–450)
PMV BLD AUTO: 9.4 FL (ref 7–12)
POTASSIUM SERPL-SCNC: 3.7 MMOL/L (ref 3.5–5)
RBC # BLD: 3.97 E12/L (ref 3.5–5.5)
SARS-COV-2, NAAT: DETECTED
SODIUM BLD-SCNC: 140 MMOL/L (ref 132–146)
TOTAL PROTEIN: 7.5 G/DL (ref 6.4–8.3)
TROPONIN, HIGH SENSITIVITY: <6 NG/L (ref 0–9)
WBC # BLD: 3.5 E9/L (ref 4.5–11.5)

## 2021-12-26 PROCEDURE — 93005 ELECTROCARDIOGRAM TRACING: CPT | Performed by: PHYSICIAN ASSISTANT

## 2021-12-26 PROCEDURE — 85025 COMPLETE CBC W/AUTO DIFF WBC: CPT

## 2021-12-26 PROCEDURE — 99283 EMERGENCY DEPT VISIT LOW MDM: CPT

## 2021-12-26 PROCEDURE — 80053 COMPREHEN METABOLIC PANEL: CPT

## 2021-12-26 PROCEDURE — 87635 SARS-COV-2 COVID-19 AMP PRB: CPT

## 2021-12-26 PROCEDURE — 84484 ASSAY OF TROPONIN QUANT: CPT

## 2021-12-26 PROCEDURE — 71046 X-RAY EXAM CHEST 2 VIEWS: CPT

## 2021-12-26 RX ORDER — BENZONATATE 100 MG/1
100 CAPSULE ORAL 3 TIMES DAILY PRN
Qty: 21 CAPSULE | Refills: 0 | Status: SHIPPED | OUTPATIENT
Start: 2021-12-26 | End: 2022-01-02

## 2021-12-26 ASSESSMENT — PAIN - FUNCTIONAL ASSESSMENT: PAIN_FUNCTIONAL_ASSESSMENT: ACTIVITIES ARE NOT PREVENTED

## 2021-12-26 ASSESSMENT — PAIN DESCRIPTION - FREQUENCY: FREQUENCY: CONTINUOUS

## 2021-12-26 ASSESSMENT — PAIN DESCRIPTION - DESCRIPTORS: DESCRIPTORS: ACHING

## 2021-12-26 ASSESSMENT — PAIN DESCRIPTION - LOCATION: LOCATION: CHEST

## 2021-12-26 ASSESSMENT — PAIN DESCRIPTION - PAIN TYPE: TYPE: ACUTE PAIN

## 2021-12-26 ASSESSMENT — PAIN SCALES - GENERAL: PAINLEVEL_OUTOF10: 8

## 2021-12-26 ASSESSMENT — PAIN DESCRIPTION - PROGRESSION: CLINICAL_PROGRESSION: GRADUALLY WORSENING

## 2021-12-26 ASSESSMENT — PAIN DESCRIPTION - ONSET: ONSET: GRADUAL

## 2021-12-26 NOTE — Clinical Note
Kenyon Higgins was seen and treated in our emergency department on 12/26/2021. She may return to work on 01/04/2022. If you have any questions or concerns, please don't hesitate to call.       ABDIAZIZ Knapp

## 2021-12-26 NOTE — ED NOTES
FIRST PROVIDER CONTACT ASSESSMENT NOTE           Department of Emergency Medicine                 First Provider Note            21  5:05 PM EST    Date of Encounter: No admission date for patient encounter. Patient Name: Shannon Alvarado  : 2000  MRN: [id number]    Chief Complaint: Fever (Began yesterday), Generalized Body Aches, and Cough      History of Present Illness:   Jillian Encinas is a 24 y.o. female who presents to the ED for multiple symptoms. Fever, cough, chest pain, myalgias, fatigue. Focused Physical Exam:  VS:    ED Triage Vitals [21 1608]   BP Temp Temp Source Pulse Resp SpO2 Height Weight   126/83 96.8 °F (36 °C) Temporal 95 18 98 % -- --        Physical Ex: Constitutional: Alert and non-toxic. Medical History:  has a past medical history of Asthma. Surgical History:  has a past surgical history that includes Tonsillectomy and  section (N/A, 2019). Social History:  reports that she has never smoked. She has never used smokeless tobacco. She reports that she does not drink alcohol and does not use drugs. Family History: family history is not on file.     Allergies: Norco [hydrocodone-acetaminophen] and Penicillins     Initial Plan of Care: Initiate Treatment-Testing, Proceed toTreatment Area When Bed Available for ED Attending/MLP to Continue Care      ---END OF FIRST PROVIDER CONTACT ASSESSMENT NOTE---  Electronically signed by Moira Rutledge PA-C   DD: 21       Moira Rutledge PA-C  21 2219

## 2021-12-27 LAB
EKG ATRIAL RATE: 78 BPM
EKG P AXIS: 37 DEGREES
EKG P-R INTERVAL: 148 MS
EKG Q-T INTERVAL: 356 MS
EKG QRS DURATION: 86 MS
EKG QTC CALCULATION (BAZETT): 405 MS
EKG R AXIS: 58 DEGREES
EKG T AXIS: 39 DEGREES
EKG VENTRICULAR RATE: 78 BPM

## 2022-01-30 ENCOUNTER — HOSPITAL ENCOUNTER (EMERGENCY)
Age: 22
Discharge: HOME | End: 2022-01-30
Attending: EMERGENCY MEDICINE
Payer: MEDICAID

## 2022-01-30 ENCOUNTER — APPOINTMENT (OUTPATIENT)
Dept: GENERAL RADIOLOGY | Age: 22
End: 2022-01-30
Payer: MEDICAID

## 2022-01-30 VITALS
DIASTOLIC BLOOD PRESSURE: 58 MMHG | BODY MASS INDEX: 39.94 KG/M2 | HEART RATE: 93 BPM | SYSTOLIC BLOOD PRESSURE: 122 MMHG | RESPIRATION RATE: 16 BRPM | OXYGEN SATURATION: 100 % | TEMPERATURE: 96.9 F | WEIGHT: 240 LBS

## 2022-01-30 DIAGNOSIS — J45.31 MILD PERSISTENT ASTHMA WITH EXACERBATION: Primary | ICD-10-CM

## 2022-01-30 PROCEDURE — 99283 EMERGENCY DEPT VISIT LOW MDM: CPT

## 2022-01-30 PROCEDURE — 6370000000 HC RX 637 (ALT 250 FOR IP): Performed by: EMERGENCY MEDICINE

## 2022-01-30 PROCEDURE — 71046 X-RAY EXAM CHEST 2 VIEWS: CPT

## 2022-01-30 PROCEDURE — 94640 AIRWAY INHALATION TREATMENT: CPT

## 2022-01-30 RX ORDER — PREDNISONE 20 MG/1
40 TABLET ORAL DAILY
Qty: 10 TABLET | Refills: 0 | Status: SHIPPED | OUTPATIENT
Start: 2022-01-30 | End: 2022-02-04

## 2022-01-30 RX ORDER — ALBUTEROL SULFATE 90 UG/1
2 AEROSOL, METERED RESPIRATORY (INHALATION) 4 TIMES DAILY PRN
Qty: 54 G | Refills: 0 | Status: SHIPPED | OUTPATIENT
Start: 2022-01-30 | End: 2022-06-15

## 2022-01-30 RX ORDER — PREDNISONE 20 MG/1
40 TABLET ORAL ONCE
Status: COMPLETED | OUTPATIENT
Start: 2022-01-30 | End: 2022-01-30

## 2022-01-30 RX ORDER — IPRATROPIUM BROMIDE AND ALBUTEROL SULFATE 2.5; .5 MG/3ML; MG/3ML
1 SOLUTION RESPIRATORY (INHALATION) ONCE
Status: COMPLETED | OUTPATIENT
Start: 2022-01-30 | End: 2022-01-30

## 2022-01-30 RX ADMIN — IPRATROPIUM BROMIDE AND ALBUTEROL SULFATE 1 AMPULE: .5; 3 SOLUTION RESPIRATORY (INHALATION) at 03:25

## 2022-01-30 RX ADMIN — PREDNISONE 40 MG: 20 TABLET ORAL at 03:52

## 2022-01-30 ASSESSMENT — ENCOUNTER SYMPTOMS
SHORTNESS OF BREATH: 1
WHEEZING: 1
SORE THROAT: 0
BACK PAIN: 0
DIARRHEA: 0
EYE PAIN: 0
ABDOMINAL DISTENTION: 0
NAUSEA: 0
SINUS PRESSURE: 0
VOMITING: 0
EYE REDNESS: 0
EYE DISCHARGE: 0
COUGH: 1

## 2022-01-30 NOTE — ED PROVIDER NOTES
Patient is a 19 y/o female who presents to the ED with shortness of breath. Patient states her symptoms began yesterday. She does have a history of asthma. She has had a nonproductive cough and wheezing. She denies any fever. She denies any chest pain. Review of Systems   Constitutional: Negative for chills and fever. HENT: Negative for ear pain, sinus pressure and sore throat. Eyes: Negative for pain, discharge and redness. Respiratory: Positive for cough, shortness of breath and wheezing. Cardiovascular: Negative for chest pain. Gastrointestinal: Negative for abdominal distention, diarrhea, nausea and vomiting. Genitourinary: Negative for dysuria and frequency. Musculoskeletal: Negative for arthralgias and back pain. Skin: Negative for rash and wound. Neurological: Negative for weakness and headaches. Hematological: Negative for adenopathy. All other systems reviewed and are negative. Physical Exam  Vitals and nursing note reviewed. Constitutional:       General: She is not in acute distress. Appearance: She is obese. HENT:      Head: Normocephalic and atraumatic. Mouth/Throat:      Mouth: Mucous membranes are moist.   Eyes:      Pupils: Pupils are equal, round, and reactive to light. Cardiovascular:      Rate and Rhythm: Normal rate and regular rhythm. Heart sounds: No murmur heard. Pulmonary:      Effort: Pulmonary effort is normal. No respiratory distress. Breath sounds: Normal breath sounds. No stridor. No wheezing, rhonchi or rales. Abdominal:      General: Bowel sounds are normal.      Palpations: Abdomen is soft. Tenderness: There is no abdominal tenderness. There is no guarding. Musculoskeletal:         General: Normal range of motion. Cervical back: Normal range of motion and neck supple. Skin:     General: Skin is warm and dry. Findings: No rash.    Neurological:      Mental Status: She is alert and oriented to person, place, and time. Procedures     [facility]             --------------------------------------------- PAST HISTORY ---------------------------------------------  Past Medical History:  has a past medical history of Asthma. Past Surgical History:  has a past surgical history that includes Tonsillectomy and  section (N/A, 2019). Social History:  reports that she has never smoked. She has never used smokeless tobacco. She reports that she does not drink alcohol and does not use drugs. Family History: family history is not on file. The patients home medications have been reviewed. Allergies: Norco [hydrocodone-acetaminophen] and Penicillins    -------------------------------------------------- RESULTS -------------------------------------------------  Labs:  No results found for this visit on 22. Radiology:  XR CHEST (2 VW)   Final Result   No acute abnormality.             ------------------------- NURSING NOTES AND VITALS REVIEWED ---------------------------  Date / Time Roomed:  2022  2:33 AM  ED Bed Assignment:      The nursing notes within the ED encounter and vital signs as below have been reviewed. BP (!) 122/58   Pulse 93   Temp 96.9 °F (36.1 °C) (Temporal)   Resp 16   Wt 240 lb (108.9 kg)   SpO2 100%   BMI 39.94 kg/m²   Oxygen Saturation Interpretation: Normal      ------------------------------------------ PROGRESS NOTES ------------------------------------------  I have spoken with the patient and discussed todays results, in addition to providing specific details for the plan of care and counseling regarding the diagnosis and prognosis. Their questions are answered at this time and they are agreeable with the plan. I discussed at length with them reasons for immediate return here for re evaluation. They will followup with primary care by calling their office tomorrow.       --------------------------------- ADDITIONAL PROVIDER NOTES ---------------------------------  At this time the patient is without objective evidence of an acute process requiring hospitalization or inpatient management. They have remained hemodynamically stable throughout their entire ED visit and are stable for discharge with outpatient follow-up. The plan has been discussed in detail and they are aware of the specific conditions for emergent return, as well as the importance of follow-up. New Prescriptions    ALBUTEROL SULFATE HFA (VENTOLIN HFA) 108 (90 BASE) MCG/ACT INHALER    Inhale 2 puffs into the lungs 4 times daily as needed for Wheezing    PREDNISONE (DELTASONE) 20 MG TABLET    Take 2 tablets by mouth daily for 5 days       Diagnosis:  1. Mild persistent asthma with exacerbation        Disposition:  Patient's disposition: Discharge to home  Patient's condition is stable.          [address] [facility],   01/30/22 4799

## 2022-02-28 ENCOUNTER — HOSPITAL ENCOUNTER (EMERGENCY)
Age: 22
Discharge: HOME | End: 2022-03-01
Attending: EMERGENCY MEDICINE
Payer: MEDICAID

## 2022-02-28 DIAGNOSIS — R10.13 EPIGASTRIC PAIN: Primary | ICD-10-CM

## 2022-02-28 PROCEDURE — 99283 EMERGENCY DEPT VISIT LOW MDM: CPT

## 2022-02-28 PROCEDURE — 96374 THER/PROPH/DIAG INJ IV PUSH: CPT

## 2022-02-28 ASSESSMENT — PAIN DESCRIPTION - FREQUENCY: FREQUENCY: INTERMITTENT

## 2022-02-28 ASSESSMENT — PAIN SCALES - GENERAL: PAINLEVEL_OUTOF10: 4

## 2022-02-28 ASSESSMENT — PAIN DESCRIPTION - LOCATION: LOCATION: ABDOMEN

## 2022-03-01 ENCOUNTER — APPOINTMENT (OUTPATIENT)
Dept: CT IMAGING | Age: 22
End: 2022-03-01
Payer: MEDICAID

## 2022-03-01 VITALS
WEIGHT: 200 LBS | HEART RATE: 80 BPM | HEIGHT: 65 IN | RESPIRATION RATE: 16 BRPM | OXYGEN SATURATION: 96 % | TEMPERATURE: 98 F | SYSTOLIC BLOOD PRESSURE: 106 MMHG | DIASTOLIC BLOOD PRESSURE: 48 MMHG | BODY MASS INDEX: 33.32 KG/M2

## 2022-03-01 LAB
ALBUMIN SERPL-MCNC: 4 G/DL (ref 3.5–5.2)
ALP BLD-CCNC: 83 U/L (ref 35–104)
ALT SERPL-CCNC: 15 U/L (ref 0–32)
ANION GAP SERPL CALCULATED.3IONS-SCNC: 11 MMOL/L (ref 7–16)
AST SERPL-CCNC: 16 U/L (ref 0–31)
BACTERIA: NORMAL /HPF
BASOPHILS ABSOLUTE: 0.03 E9/L (ref 0–0.2)
BASOPHILS RELATIVE PERCENT: 0.4 % (ref 0–2)
BILIRUB SERPL-MCNC: 0.3 MG/DL (ref 0–1.2)
BILIRUBIN URINE: NEGATIVE
BLOOD, URINE: ABNORMAL
BUN BLDV-MCNC: 13 MG/DL (ref 6–20)
CALCIUM SERPL-MCNC: 9.4 MG/DL (ref 8.6–10.2)
CHLORIDE BLD-SCNC: 102 MMOL/L (ref 98–107)
CLARITY: ABNORMAL
CO2: 24 MMOL/L (ref 22–29)
COLOR: YELLOW
CREAT SERPL-MCNC: 0.8 MG/DL (ref 0.5–1)
EKG ATRIAL RATE: 85 BPM
EKG P AXIS: 31 DEGREES
EKG P-R INTERVAL: 154 MS
EKG Q-T INTERVAL: 370 MS
EKG QRS DURATION: 76 MS
EKG QTC CALCULATION (BAZETT): 440 MS
EKG R AXIS: 16 DEGREES
EKG T AXIS: 21 DEGREES
EKG VENTRICULAR RATE: 85 BPM
EOSINOPHILS ABSOLUTE: 0.13 E9/L (ref 0.05–0.5)
EOSINOPHILS RELATIVE PERCENT: 1.9 % (ref 0–6)
GFR AFRICAN AMERICAN: >60
GFR NON-AFRICAN AMERICAN: >60 ML/MIN/1.73
GLUCOSE BLD-MCNC: 93 MG/DL (ref 74–99)
GLUCOSE URINE: NEGATIVE MG/DL
HCG, URINE, POC: NEGATIVE
HCT VFR BLD CALC: 34.9 % (ref 34–48)
HEMOGLOBIN: 11.2 G/DL (ref 11.5–15.5)
IMMATURE GRANULOCYTES #: 0.02 E9/L
IMMATURE GRANULOCYTES %: 0.3 % (ref 0–5)
KETONES, URINE: NEGATIVE MG/DL
LEUKOCYTE ESTERASE, URINE: NEGATIVE
LIPASE: 23 U/L (ref 13–60)
LYMPHOCYTES ABSOLUTE: 2.2 E9/L (ref 1.5–4)
LYMPHOCYTES RELATIVE PERCENT: 31.9 % (ref 20–42)
Lab: NORMAL
MCH RBC QN AUTO: 27.5 PG (ref 26–35)
MCHC RBC AUTO-ENTMCNC: 32.1 % (ref 32–34.5)
MCV RBC AUTO: 85.7 FL (ref 80–99.9)
MONOCYTES ABSOLUTE: 0.43 E9/L (ref 0.1–0.95)
MONOCYTES RELATIVE PERCENT: 6.2 % (ref 2–12)
NEGATIVE QC PASS/FAIL: NORMAL
NEUTROPHILS ABSOLUTE: 4.08 E9/L (ref 1.8–7.3)
NEUTROPHILS RELATIVE PERCENT: 59.3 % (ref 43–80)
NITRITE, URINE: NEGATIVE
PDW BLD-RTO: 14.2 FL (ref 11.5–15)
PH UA: 6 (ref 5–9)
PLATELET # BLD: 352 E9/L (ref 130–450)
PMV BLD AUTO: 9.2 FL (ref 7–12)
POSITIVE QC PASS/FAIL: NORMAL
POTASSIUM SERPL-SCNC: 3.7 MMOL/L (ref 3.5–5)
PROTEIN UA: ABNORMAL MG/DL
RBC # BLD: 4.07 E12/L (ref 3.5–5.5)
RBC UA: >20 /HPF (ref 0–2)
SODIUM BLD-SCNC: 137 MMOL/L (ref 132–146)
SPECIFIC GRAVITY UA: >=1.03 (ref 1–1.03)
TOTAL PROTEIN: 7.3 G/DL (ref 6.4–8.3)
TROPONIN, HIGH SENSITIVITY: <6 NG/L (ref 0–9)
UROBILINOGEN, URINE: 0.2 E.U./DL
WBC # BLD: 6.9 E9/L (ref 4.5–11.5)
WBC UA: NORMAL /HPF (ref 0–5)

## 2022-03-01 PROCEDURE — 93005 ELECTROCARDIOGRAM TRACING: CPT | Performed by: EMERGENCY MEDICINE

## 2022-03-01 PROCEDURE — 84484 ASSAY OF TROPONIN QUANT: CPT

## 2022-03-01 PROCEDURE — 83690 ASSAY OF LIPASE: CPT

## 2022-03-01 PROCEDURE — 6370000000 HC RX 637 (ALT 250 FOR IP): Performed by: EMERGENCY MEDICINE

## 2022-03-01 PROCEDURE — 6360000002 HC RX W HCPCS: Performed by: EMERGENCY MEDICINE

## 2022-03-01 PROCEDURE — 2580000003 HC RX 258: Performed by: EMERGENCY MEDICINE

## 2022-03-01 PROCEDURE — 80053 COMPREHEN METABOLIC PANEL: CPT

## 2022-03-01 PROCEDURE — 6360000004 HC RX CONTRAST MEDICATION: Performed by: RADIOLOGY

## 2022-03-01 PROCEDURE — 81001 URINALYSIS AUTO W/SCOPE: CPT

## 2022-03-01 PROCEDURE — 74177 CT ABD & PELVIS W/CONTRAST: CPT

## 2022-03-01 PROCEDURE — 36415 COLL VENOUS BLD VENIPUNCTURE: CPT

## 2022-03-01 PROCEDURE — 85025 COMPLETE CBC W/AUTO DIFF WBC: CPT

## 2022-03-01 RX ORDER — ONDANSETRON 2 MG/ML
4 INJECTION INTRAMUSCULAR; INTRAVENOUS ONCE
Status: COMPLETED | OUTPATIENT
Start: 2022-03-01 | End: 2022-03-01

## 2022-03-01 RX ORDER — 0.9 % SODIUM CHLORIDE 0.9 %
1000 INTRAVENOUS SOLUTION INTRAVENOUS ONCE
Status: COMPLETED | OUTPATIENT
Start: 2022-03-01 | End: 2022-03-01

## 2022-03-01 RX ADMIN — LIDOCAINE HYDROCHLORIDE: 20 SOLUTION ORAL; TOPICAL at 02:38

## 2022-03-01 RX ADMIN — IOPAMIDOL 80 ML: 755 INJECTION, SOLUTION INTRAVENOUS at 01:46

## 2022-03-01 RX ADMIN — SODIUM CHLORIDE 1000 ML: 9 INJECTION, SOLUTION INTRAVENOUS at 00:52

## 2022-03-01 RX ADMIN — ONDANSETRON 4 MG: 2 INJECTION INTRAMUSCULAR; INTRAVENOUS at 00:54

## 2022-03-01 ASSESSMENT — ENCOUNTER SYMPTOMS
COUGH: 0
SINUS PRESSURE: 0
ABDOMINAL PAIN: 1
NAUSEA: 1
EYE PAIN: 0
VOMITING: 0
DIARRHEA: 0
SHORTNESS OF BREATH: 0
BACK PAIN: 0
EYE REDNESS: 0
WHEEZING: 0
EYE DISCHARGE: 0
SORE THROAT: 0
ABDOMINAL DISTENTION: 0

## 2022-03-01 NOTE — ED TRIAGE NOTES
Pt.  Has been with child (original pt.) in room 13. Now decides to sign herself in for abdominal pain.

## 2022-03-01 NOTE — ED PROVIDER NOTES
Patient is a 19 y/o female who presents to the ED with abdominal pain. Patient states she had onset of epigastric abdominal pain yesterday. The pain has been constant and is currently 4/10. It does not radiate. She is nauseated but denies any vomiting. She denies any fever or diarrhea. She denies any chest pain or shortness of breath. Review of Systems   Constitutional: Negative for chills and fever. HENT: Negative for ear pain, sinus pressure and sore throat. Eyes: Negative for pain, discharge and redness. Respiratory: Negative for cough, shortness of breath and wheezing. Cardiovascular: Negative for chest pain. Gastrointestinal: Positive for abdominal pain and nausea. Negative for abdominal distention, diarrhea and vomiting. Genitourinary: Negative for dysuria and frequency. Musculoskeletal: Negative for arthralgias and back pain. Skin: Negative for rash and wound. Neurological: Negative for weakness and headaches. Hematological: Negative for adenopathy. All other systems reviewed and are negative. Physical Exam  Vitals and nursing note reviewed. Constitutional:       General: She is not in acute distress. Appearance: She is obese. HENT:      Head: Normocephalic and atraumatic. Mouth/Throat:      Mouth: Mucous membranes are moist.   Eyes:      Pupils: Pupils are equal, round, and reactive to light. Cardiovascular:      Rate and Rhythm: Normal rate and regular rhythm. Heart sounds: No murmur heard. Pulmonary:      Effort: Pulmonary effort is normal. No respiratory distress. Breath sounds: Normal breath sounds. No stridor. No wheezing, rhonchi or rales. Abdominal:      General: Abdomen is flat. Bowel sounds are normal.      Palpations: Abdomen is soft. Tenderness: There is no abdominal tenderness. There is no guarding. Negative signs include Boss's sign and McBurney's sign. Skin:     General: Skin is warm and dry.       Findings: No rash.   Neurological:      Mental Status: She is alert and oriented to person, place, and time. Procedures     MDM        EKG:  Normal sinus rhythm with ventricular rate of 85. DC interval, QRS duration and QT interval within normal range. Normal axis. No ST segment abnormalities to suggest acute ischemia.         --------------------------------------------- PAST HISTORY ---------------------------------------------  Past Medical History:  has a past medical history of Asthma. Past Surgical History:  has a past surgical history that includes Tonsillectomy and  section (N/A, 2019). Social History:  reports that she has never smoked. She has never used smokeless tobacco. She reports that she does not drink alcohol and does not use drugs. Family History: family history is not on file. The patients home medications have been reviewed.     Allergies: Norco [hydrocodone-acetaminophen] and Penicillins    -------------------------------------------------- RESULTS -------------------------------------------------  Labs:  Results for orders placed or performed during the hospital encounter of 22   CBC with Auto Differential   Result Value Ref Range    WBC 6.9 4.5 - 11.5 E9/L    RBC 4.07 3.50 - 5.50 E12/L    Hemoglobin 11.2 (L) 11.5 - 15.5 g/dL    Hematocrit 34.9 34.0 - 48.0 %    MCV 85.7 80.0 - 99.9 fL    MCH 27.5 26.0 - 35.0 pg    MCHC 32.1 32.0 - 34.5 %    RDW 14.2 11.5 - 15.0 fL    Platelets 142 225 - 187 E9/L    MPV 9.2 7.0 - 12.0 fL    Neutrophils % 59.3 43.0 - 80.0 %    Immature Granulocytes % 0.3 0.0 - 5.0 %    Lymphocytes % 31.9 20.0 - 42.0 %    Monocytes % 6.2 2.0 - 12.0 %    Eosinophils % 1.9 0.0 - 6.0 %    Basophils % 0.4 0.0 - 2.0 %    Neutrophils Absolute 4.08 1.80 - 7.30 E9/L    Immature Granulocytes # 0.02 E9/L    Lymphocytes Absolute 2.20 1.50 - 4.00 E9/L    Monocytes Absolute 0.43 0.10 - 0.95 E9/L    Eosinophils Absolute 0.13 0.05 - 0.50 E9/L    Basophils Absolute 0. 03 0.00 - 0.20 E9/L   Comprehensive Metabolic Panel   Result Value Ref Range    Sodium 137 132 - 146 mmol/L    Potassium 3.7 3.5 - 5.0 mmol/L    Chloride 102 98 - 107 mmol/L    CO2 24 22 - 29 mmol/L    Anion Gap 11 7 - 16 mmol/L    Glucose 93 74 - 99 mg/dL    BUN 13 6 - 20 mg/dL    CREATININE 0.8 0.5 - 1.0 mg/dL    GFR Non-African American >60 >=60 mL/min/1.73    GFR African American >60     Calcium 9.4 8.6 - 10.2 mg/dL    Total Protein 7.3 6.4 - 8.3 g/dL    Albumin 4.0 3.5 - 5.2 g/dL    Total Bilirubin 0.3 0.0 - 1.2 mg/dL    Alkaline Phosphatase 83 35 - 104 U/L    ALT 15 0 - 32 U/L    AST 16 0 - 31 U/L   Lipase   Result Value Ref Range    Lipase 23 13 - 60 U/L   Troponin   Result Value Ref Range    Troponin, High Sensitivity <6 0 - 9 ng/L   Urinalysis   Result Value Ref Range    Color, UA Yellow Straw/Yellow    Clarity, UA CLOUDY (A) Clear    Glucose, Ur Negative Negative mg/dL    Bilirubin Urine Negative Negative    Ketones, Urine Negative Negative mg/dL    Specific Gravity, UA >=1.030 1.005 - 1.030    Blood, Urine LARGE (A) Negative    pH, UA 6.0 5.0 - 9.0    Protein, UA TRACE Negative mg/dL    Urobilinogen, Urine 0.2 <2.0 E.U./dL    Nitrite, Urine Negative Negative    Leukocyte Esterase, Urine Negative Negative   Microscopic Urinalysis   Result Value Ref Range    WBC, UA 1-3 0 - 5 /HPF    RBC, UA >20 0 - 2 /HPF    Bacteria, UA NONE SEEN None Seen /HPF   POC Pregnancy Urine Qual   Result Value Ref Range    HCG, Urine, POC Negative Negative    Lot Number [id number]     Positive QC Pass/Fail Pass     Negative QC Pass/Fail Pass    EKG 12 Lead   Result Value Ref Range    Ventricular Rate 85 BPM    Atrial Rate 85 BPM    P-R Interval 154 ms    QRS Duration 76 ms    Q-T Interval 370 ms    QTc Calculation (Bazett) 440 ms    P Axis 31 degrees    R Axis 16 degrees    T Axis 21 degrees       Radiology:  CT ABDOMEN PELVIS W IV CONTRAST Additional Contrast? None   Final Result   No acute finding in the abdomen and pelvis. Suggestion of diffuse hepatic steatosis. Status post sleeve gastrectomy. No evidence of bowel obstruction. Normal appendix.             ------------------------- NURSING NOTES AND VITALS REVIEWED ---------------------------  Date / Time Roomed:  2/28/2022 11:50 PM  ED Bed Assignment:  15/15    The nursing notes within the ED encounter and vital signs as below have been reviewed. /66   Pulse 85   Temp 98 °F (36.7 °C) (Infrared)   Resp 18   Ht 5' 5\" (1.651 m)   Wt 200 lb (90.7 kg)   LMP 02/28/2022   SpO2 97%   BMI 33.28 kg/m²   Oxygen Saturation Interpretation: Normal      ------------------------------------------ PROGRESS NOTES ------------------------------------------  I have spoken with the patient and discussed todays results, in addition to providing specific details for the plan of care and counseling regarding the diagnosis and prognosis. Their questions are answered at this time and they are agreeable with the plan. I discussed at length with them reasons for immediate return here for re evaluation. They will followup with primary care by calling their office tomorrow. --------------------------------- ADDITIONAL PROVIDER NOTES ---------------------------------  At this time the patient is without objective evidence of an acute process requiring hospitalization or inpatient management. They have remained hemodynamically stable throughout their entire ED visit and are stable for discharge with outpatient follow-up. The plan has been discussed in detail and they are aware of the specific conditions for emergent return, as well as the importance of follow-up. New Prescriptions    No medications on file       Diagnosis:  1. Epigastric pain        Disposition:  Patient's disposition: Discharge to home  Patient's condition is stable.          [address] [facility],   03/01/22 9126

## 2022-04-10 ENCOUNTER — HOSPITAL ENCOUNTER (EMERGENCY)
Age: 22
Discharge: HOME | End: 2022-04-10
Attending: EMERGENCY MEDICINE
Payer: MEDICAID

## 2022-04-10 ENCOUNTER — APPOINTMENT (OUTPATIENT)
Dept: GENERAL RADIOLOGY | Age: 22
End: 2022-04-10
Payer: MEDICAID

## 2022-04-10 VITALS
WEIGHT: 220 LBS | TEMPERATURE: 97.2 F | HEART RATE: 55 BPM | OXYGEN SATURATION: 98 % | HEIGHT: 65 IN | DIASTOLIC BLOOD PRESSURE: 62 MMHG | SYSTOLIC BLOOD PRESSURE: 94 MMHG | RESPIRATION RATE: 16 BRPM | BODY MASS INDEX: 36.65 KG/M2

## 2022-04-10 DIAGNOSIS — F41.1 ANXIETY STATE: Primary | ICD-10-CM

## 2022-04-10 DIAGNOSIS — R07.9 CHEST PAIN, UNSPECIFIED TYPE: ICD-10-CM

## 2022-04-10 LAB
ALBUMIN SERPL-MCNC: 4.3 G/DL (ref 3.5–5.2)
ALP BLD-CCNC: 83 U/L (ref 35–104)
ALT SERPL-CCNC: 16 U/L (ref 0–32)
ANION GAP SERPL CALCULATED.3IONS-SCNC: 7 MMOL/L (ref 7–16)
AST SERPL-CCNC: 15 U/L (ref 0–31)
BASOPHILS ABSOLUTE: 0.06 E9/L (ref 0–0.2)
BASOPHILS RELATIVE PERCENT: 0.7 % (ref 0–2)
BILIRUB SERPL-MCNC: 0.3 MG/DL (ref 0–1.2)
BUN BLDV-MCNC: 10 MG/DL (ref 6–20)
CALCIUM SERPL-MCNC: 9 MG/DL (ref 8.6–10.2)
CHLORIDE BLD-SCNC: 103 MMOL/L (ref 98–107)
CO2: 27 MMOL/L (ref 22–29)
CREAT SERPL-MCNC: 0.7 MG/DL (ref 0.5–1)
EOSINOPHILS ABSOLUTE: 0.12 E9/L (ref 0.05–0.5)
EOSINOPHILS RELATIVE PERCENT: 1.4 % (ref 0–6)
GFR AFRICAN AMERICAN: >60
GFR NON-AFRICAN AMERICAN: >60 ML/MIN/1.73
GLUCOSE BLD-MCNC: 101 MG/DL (ref 74–99)
HCG, URINE, POC: NEGATIVE
HCT VFR BLD CALC: 33.1 % (ref 34–48)
HEMOGLOBIN: 10.5 G/DL (ref 11.5–15.5)
IMMATURE GRANULOCYTES #: 0.03 E9/L
IMMATURE GRANULOCYTES %: 0.4 % (ref 0–5)
LYMPHOCYTES ABSOLUTE: 2.05 E9/L (ref 1.5–4)
LYMPHOCYTES RELATIVE PERCENT: 23.9 % (ref 20–42)
Lab: NORMAL
MCH RBC QN AUTO: 27.7 PG (ref 26–35)
MCHC RBC AUTO-ENTMCNC: 31.7 % (ref 32–34.5)
MCV RBC AUTO: 87.3 FL (ref 80–99.9)
MONOCYTES ABSOLUTE: 0.45 E9/L (ref 0.1–0.95)
MONOCYTES RELATIVE PERCENT: 5.3 % (ref 2–12)
NEGATIVE QC PASS/FAIL: NORMAL
NEUTROPHILS ABSOLUTE: 5.86 E9/L (ref 1.8–7.3)
NEUTROPHILS RELATIVE PERCENT: 68.3 % (ref 43–80)
PDW BLD-RTO: 14.3 FL (ref 11.5–15)
PLATELET # BLD: 399 E9/L (ref 130–450)
PMV BLD AUTO: 8.7 FL (ref 7–12)
POSITIVE QC PASS/FAIL: NORMAL
POTASSIUM REFLEX MAGNESIUM: 3.9 MMOL/L (ref 3.5–5)
RBC # BLD: 3.79 E12/L (ref 3.5–5.5)
SODIUM BLD-SCNC: 137 MMOL/L (ref 132–146)
TOTAL PROTEIN: 7.4 G/DL (ref 6.4–8.3)
TROPONIN, HIGH SENSITIVITY: <6 NG/L (ref 0–9)
WBC # BLD: 8.6 E9/L (ref 4.5–11.5)

## 2022-04-10 PROCEDURE — 6360000002 HC RX W HCPCS: Performed by: EMERGENCY MEDICINE

## 2022-04-10 PROCEDURE — 93005 ELECTROCARDIOGRAM TRACING: CPT | Performed by: PHYSICIAN ASSISTANT

## 2022-04-10 PROCEDURE — 85025 COMPLETE CBC W/AUTO DIFF WBC: CPT

## 2022-04-10 PROCEDURE — 71046 X-RAY EXAM CHEST 2 VIEWS: CPT

## 2022-04-10 PROCEDURE — 99284 EMERGENCY DEPT VISIT MOD MDM: CPT

## 2022-04-10 PROCEDURE — 80053 COMPREHEN METABOLIC PANEL: CPT

## 2022-04-10 PROCEDURE — 84484 ASSAY OF TROPONIN QUANT: CPT

## 2022-04-10 PROCEDURE — 96374 THER/PROPH/DIAG INJ IV PUSH: CPT

## 2022-04-10 PROCEDURE — 36415 COLL VENOUS BLD VENIPUNCTURE: CPT

## 2022-04-10 RX ORDER — KETOROLAC TROMETHAMINE 30 MG/ML
15 INJECTION, SOLUTION INTRAMUSCULAR; INTRAVENOUS ONCE
Status: COMPLETED | OUTPATIENT
Start: 2022-04-10 | End: 2022-04-10

## 2022-04-10 RX ADMIN — KETOROLAC TROMETHAMINE 15 MG: 30 INJECTION, SOLUTION INTRAMUSCULAR at 15:32

## 2022-04-10 ASSESSMENT — ENCOUNTER SYMPTOMS
NAUSEA: 1
DIARRHEA: 0
VOICE CHANGE: 0
SHORTNESS OF BREATH: 0
VOMITING: 0
COUGH: 0
TROUBLE SWALLOWING: 0
PHOTOPHOBIA: 0
ABDOMINAL PAIN: 0

## 2022-04-10 ASSESSMENT — PAIN SCALES - GENERAL: PAINLEVEL_OUTOF10: 5

## 2022-04-10 NOTE — ED PROVIDER NOTES
HPI   Patient is a 79-year-old female with no pertinent medical history presenting to the emergency department due to worsening acute chest pain. Patient notes that her chest pain has been ongoing for 3 days. She denies any inciting factors for the pain. She localizes the pain to her mid chest.  She describes it as intermittent, nonradiating and sharp in nature. Nothing in particular makes it better or worse. Patient also noting worsening anxiety. She states that she has been wanting to cry a lot lately and thought that her chest pain could be related to anxiety or stress. Patient apparently has increased stress at home due to separation from her  recently. Also endorsing globus feeling and mild nausea at times. She has no personal cardiac history. Patient denying any fever, chills, shortness of breath, vomiting, abdominal pain, lightheadedness, syncope, urinary symptoms, constipation or diarrhea. Her symptoms are mild to moderate with no remitting or exacerbating factors. Patient denying any PE risk factors including hx of VTE, recent surgery/immobilization, leg swelling, hemoptysis, syncope, or hormonal medication use. Patient is Afghan-speaking and history is obtained via  Pad. Review of Systems   Constitutional: Negative for chills and fever. HENT: Negative for trouble swallowing and voice change. Eyes: Negative for photophobia and visual disturbance. Respiratory: Negative for cough and shortness of breath. Cardiovascular: Positive for chest pain. Negative for palpitations. Gastrointestinal: Positive for nausea. Negative for abdominal pain, diarrhea and vomiting. Genitourinary: Negative for dysuria. Musculoskeletal: Negative for arthralgias. Skin: Negative for rash and wound. Neurological: Negative for dizziness and headaches. Psychiatric/Behavioral: Negative for behavioral problems and confusion.         Anxiety        Physical Exam  Constitutional: General: She is not in acute distress. Appearance: Normal appearance. She is not ill-appearing. HENT:      Head: Normocephalic and atraumatic. Eyes:      Pupils: Pupils are equal, round, and reactive to light. Cardiovascular:      Rate and Rhythm: Normal rate and regular rhythm. Pulses: Normal pulses. Heart sounds: Normal heart sounds. Pulmonary:      Effort: Pulmonary effort is normal. No respiratory distress. Breath sounds: Normal breath sounds. No wheezing or rales. Abdominal:      Tenderness: There is no abdominal tenderness. There is no guarding or rebound. Musculoskeletal:      Cervical back: Normal range of motion and neck supple. Skin:     General: Skin is warm and dry. Neurological:      General: No focal deficit present. Mental Status: She is alert and oriented to person, place, and time. Cranial Nerves: No cranial nerve deficit. Coordination: Coordination normal.        MDM   Patient is a 59-year-old female with no pertinent medical history presents to the emergency department due to worsening acute chest pain. Patient symptoms have been ongoing for 3 days. On initial evaluation, patient was nontoxic appearing, afebrile, hemodynamically stable in no acute distress. Patient denying active chest pain on initial evaluation. She thought that her pain may be related to her increased anxiety or stress at home. Work-up in the emergency department generally unremarkable. Cardiac work-up negative. EKG sinus and nonspecific. Chest x-ray with no acute cardiopulmonary process. Negative troponin. No PE risk factors noted. Patient not hypoxic with normal heart rate. Work-up and results were discussed with the patient. She was agreeable to discharge with outpatient follow-up as needed. Given IV Toradol in the ED with some improvement in her presenting symptoms. Patient given strict return precautions in case of any new or worsening symptoms.     ED Course MCV 87.3 80.0 - 99.9 fL    MCH 27.7 26.0 - 35.0 pg    MCHC 31.7 (L) 32.0 - 34.5 %    RDW 14.3 11.5 - 15.0 fL    Platelets 233 790 - 722 E9/L    MPV 8.7 7.0 - 12.0 fL    Neutrophils % 68.3 43.0 - 80.0 %    Immature Granulocytes % 0.4 0.0 - 5.0 %    Lymphocytes % 23.9 20.0 - 42.0 %    Monocytes % 5.3 2.0 - 12.0 %    Eosinophils % 1.4 0.0 - 6.0 %    Basophils % 0.7 0.0 - 2.0 %    Neutrophils Absolute 5.86 1.80 - 7.30 E9/L    Immature Granulocytes # 0.03 E9/L    Lymphocytes Absolute 2.05 1.50 - 4.00 E9/L    Monocytes Absolute 0.45 0.10 - 0.95 E9/L    Eosinophils Absolute 0.12 0.05 - 0.50 E9/L    Basophils Absolute 0.06 0.00 - 0.20 E9/L   Comprehensive Metabolic Panel w/ Reflex to MG   Result Value Ref Range    Sodium 137 132 - 146 mmol/L    Potassium reflex Magnesium 3.9 3.5 - 5.0 mmol/L    Chloride 103 98 - 107 mmol/L    CO2 27 22 - 29 mmol/L    Anion Gap 7 7 - 16 mmol/L    Glucose 101 (H) 74 - 99 mg/dL    BUN 10 6 - 20 mg/dL    CREATININE 0.7 0.5 - 1.0 mg/dL    GFR Non-African American >60 >=60 mL/min/1.73    GFR African American >60     Calcium 9.0 8.6 - 10.2 mg/dL    Total Protein 7.4 6.4 - 8.3 g/dL    Albumin 4.3 3.5 - 5.2 g/dL    Total Bilirubin 0.3 0.0 - 1.2 mg/dL    Alkaline Phosphatase 83 35 - 104 U/L    ALT 16 0 - 32 U/L    AST 15 0 - 31 U/L   Troponin   Result Value Ref Range    Troponin, High Sensitivity <6 0 - 9 ng/L   POC Pregnancy Urine Qual   Result Value Ref Range    HCG, Urine, POC Negative Negative    Lot Number [id number]     Positive QC Pass/Fail Pass     Negative QC Pass/Fail Pass    EKG 12 Lead   Result Value Ref Range    Ventricular Rate 59 BPM    Atrial Rate 59 BPM    P-R Interval 128 ms    QRS Duration 92 ms    Q-T Interval 404 ms    QTc Calculation (Bazett) 399 ms    P Axis 14 degrees    R Axis 25 degrees    T Axis 23 degrees       Radiology:  XR CHEST (2 VW)   Final Result   No pneumonia or pleural effusion.             ------------------------- NURSING NOTES AND VITALS REVIEWED ---------------------------  Date / Time Roomed:  4/10/2022  2:30 PM  ED Bed Assignment:  05/05    The nursing notes within the ED encounter and vital signs as below have been reviewed. BP 94/62   Pulse 55   Temp 97.2 °F (36.2 °C) (Oral)   Resp 16   Ht 5' 5\" (1.651 m)   Wt 220 lb (99.8 kg)   SpO2 98%   BMI 36.61 kg/m²   Oxygen Saturation Interpretation: Normal      ------------------------------------------ PROGRESS NOTES ------------------------------------------  I have spoken with the patient and discussed todays results, in addition to providing specific details for the plan of care and counseling regarding the diagnosis and prognosis. Their questions are answered at this time and they are agreeable with the plan. I discussed at length with them reasons for immediate return here for re evaluation. They will followup with primary care by calling their office tomorrow. --------------------------------- ADDITIONAL PROVIDER NOTES ---------------------------------  At this time the patient is without objective evidence of an acute process requiring hospitalization or inpatient management. They have remained hemodynamically stable throughout their entire ED visit and are stable for discharge with outpatient follow-up. The plan has been discussed in detail and they are aware of the specific conditions for emergent return, as well as the importance of follow-up. Discharge Medication List as of 4/10/2022  3:44 PM          Diagnosis:  1. Anxiety state    2. Chest pain, unspecified type        Disposition:  Patient's disposition: Discharge to home  Patient's condition is stable.             George Montemayor, DO  Resident  04/10/22 1083

## 2022-04-10 NOTE — ED NOTES
Department of Emergency Medicine    FIRST PROVIDER TRIAGE NOTE             Independent MLP           4/10/22  1:33 PM EDT    Date of Encounter: 4/10/22   MRN: [id number]    Vitals:    04/10/22 1330 04/10/22 1453 04/10/22 1534   BP: 133/63 132/68 94/62   Pulse: 64 68 55   Resp: 14 18 16   Temp: 97 °F (36.1 °C) 97.2 °F (36.2 °C)    TempSrc: Temporal Oral    SpO2: 100% 99% 98%   Weight: 220 lb (99.8 kg)     Height: 5' 5\" (1.651 m)        HPI: Deng Client is a 20 y.o. female who presents to the ED for Chest Pain (mid x3 days, pain increases with palpation, reports hx anxiety)    ROS: Negative for abd pain or back pain. Physical Exam:   Gen Appearance/Constitutional: alert  CV: regular rate     Initial Plan of Care: All treatment areas with department are currently occupied. Plan to order/Initiate the following while awaiting opening in ED: labs, EKG and imaging studies.     Initial Plan of Care: Initiate Treatment-Testing, Proceed toTreatment Area When Bed Available for ED Attending/MLP to Continue Care    Electronically signed by Dariana Monahan PA-C   DD: 4/10/22       Dariana Monahan PA-C  04/10/22 1154

## 2022-04-11 LAB
EKG ATRIAL RATE: 59 BPM
EKG P AXIS: 14 DEGREES
EKG P-R INTERVAL: 128 MS
EKG Q-T INTERVAL: 404 MS
EKG QRS DURATION: 92 MS
EKG QTC CALCULATION (BAZETT): 399 MS
EKG R AXIS: 25 DEGREES
EKG T AXIS: 23 DEGREES
EKG VENTRICULAR RATE: 59 BPM

## 2022-05-18 ENCOUNTER — HOSPITAL ENCOUNTER (EMERGENCY)
Age: 22
Discharge: HOME | End: 2022-05-18
Attending: EMERGENCY MEDICINE
Payer: MEDICAID

## 2022-05-18 VITALS
TEMPERATURE: 97.3 F | HEIGHT: 65 IN | DIASTOLIC BLOOD PRESSURE: 61 MMHG | SYSTOLIC BLOOD PRESSURE: 109 MMHG | HEART RATE: 79 BPM | BODY MASS INDEX: 35.82 KG/M2 | OXYGEN SATURATION: 100 % | WEIGHT: 215 LBS | RESPIRATION RATE: 18 BRPM

## 2022-05-18 DIAGNOSIS — R42 LIGHTHEADEDNESS: Primary | ICD-10-CM

## 2022-05-18 LAB
ALBUMIN SERPL-MCNC: 4.1 G/DL (ref 3.5–5.2)
ALP BLD-CCNC: 81 U/L (ref 35–104)
ALT SERPL-CCNC: 13 U/L (ref 0–32)
ANION GAP SERPL CALCULATED.3IONS-SCNC: 8 MMOL/L (ref 7–16)
AST SERPL-CCNC: 14 U/L (ref 0–31)
BACTERIA: ABNORMAL /HPF
BASOPHILS ABSOLUTE: 0.07 E9/L (ref 0–0.2)
BASOPHILS RELATIVE PERCENT: 0.6 % (ref 0–2)
BILIRUB SERPL-MCNC: <0.2 MG/DL (ref 0–1.2)
BILIRUBIN URINE: NEGATIVE
BLOOD, URINE: ABNORMAL
BUN BLDV-MCNC: 12 MG/DL (ref 6–20)
CALCIUM SERPL-MCNC: 9 MG/DL (ref 8.6–10.2)
CHLORIDE BLD-SCNC: 105 MMOL/L (ref 98–107)
CLARITY: ABNORMAL
CO2: 26 MMOL/L (ref 22–29)
COLOR: ABNORMAL
CREAT SERPL-MCNC: 0.7 MG/DL (ref 0.5–1)
EOSINOPHILS ABSOLUTE: 0.23 E9/L (ref 0.05–0.5)
EOSINOPHILS RELATIVE PERCENT: 1.8 % (ref 0–6)
EPITHELIAL CELLS, UA: ABNORMAL /HPF
GFR AFRICAN AMERICAN: >60
GFR NON-AFRICAN AMERICAN: >60 ML/MIN/1.73
GLUCOSE BLD-MCNC: 103 MG/DL (ref 74–99)
GLUCOSE URINE: NEGATIVE MG/DL
HCG, URINE, POC: NEGATIVE
HCT VFR BLD CALC: 35.2 % (ref 34–48)
HEMOGLOBIN: 10.7 G/DL (ref 11.5–15.5)
IMMATURE GRANULOCYTES #: 0.04 E9/L
IMMATURE GRANULOCYTES %: 0.3 % (ref 0–5)
KETONES, URINE: NEGATIVE MG/DL
LEUKOCYTE ESTERASE, URINE: NEGATIVE
LIPASE: 35 U/L (ref 13–60)
LYMPHOCYTES ABSOLUTE: 2.85 E9/L (ref 1.5–4)
LYMPHOCYTES RELATIVE PERCENT: 22.7 % (ref 20–42)
Lab: NORMAL
MCH RBC QN AUTO: 26.8 PG (ref 26–35)
MCHC RBC AUTO-ENTMCNC: 30.4 % (ref 32–34.5)
MCV RBC AUTO: 88 FL (ref 80–99.9)
MONOCYTES ABSOLUTE: 0.65 E9/L (ref 0.1–0.95)
MONOCYTES RELATIVE PERCENT: 5.2 % (ref 2–12)
NEGATIVE QC PASS/FAIL: NORMAL
NEUTROPHILS ABSOLUTE: 8.7 E9/L (ref 1.8–7.3)
NEUTROPHILS RELATIVE PERCENT: 69.4 % (ref 43–80)
NITRITE, URINE: NEGATIVE
PDW BLD-RTO: 13.2 FL (ref 11.5–15)
PH UA: 5.5 (ref 5–9)
PLATELET # BLD: 436 E9/L (ref 130–450)
PMV BLD AUTO: 9.2 FL (ref 7–12)
POSITIVE QC PASS/FAIL: NORMAL
POTASSIUM REFLEX MAGNESIUM: 3.8 MMOL/L (ref 3.5–5)
PROTEIN UA: 30 MG/DL
RBC # BLD: 4 E12/L (ref 3.5–5.5)
RBC UA: >20 /HPF (ref 0–2)
SODIUM BLD-SCNC: 139 MMOL/L (ref 132–146)
SPECIFIC GRAVITY UA: >=1.03 (ref 1–1.03)
TOTAL PROTEIN: 7.7 G/DL (ref 6.4–8.3)
UROBILINOGEN, URINE: 2 E.U./DL
WBC # BLD: 12.5 E9/L (ref 4.5–11.5)
WBC UA: ABNORMAL /HPF (ref 0–5)

## 2022-05-18 PROCEDURE — 85025 COMPLETE CBC W/AUTO DIFF WBC: CPT

## 2022-05-18 PROCEDURE — 80053 COMPREHEN METABOLIC PANEL: CPT

## 2022-05-18 PROCEDURE — 83690 ASSAY OF LIPASE: CPT

## 2022-05-18 PROCEDURE — 99284 EMERGENCY DEPT VISIT MOD MDM: CPT

## 2022-05-18 PROCEDURE — 81001 URINALYSIS AUTO W/SCOPE: CPT

## 2022-05-18 PROCEDURE — 2580000003 HC RX 258: Performed by: EMERGENCY MEDICINE

## 2022-05-18 RX ORDER — 0.9 % SODIUM CHLORIDE 0.9 %
1000 INTRAVENOUS SOLUTION INTRAVENOUS ONCE
Status: COMPLETED | OUTPATIENT
Start: 2022-05-18 | End: 2022-05-18

## 2022-05-18 RX ADMIN — SODIUM CHLORIDE 1000 ML: 9 INJECTION, SOLUTION INTRAVENOUS at 16:41

## 2022-05-18 ASSESSMENT — ENCOUNTER SYMPTOMS: ABDOMINAL PAIN: 1

## 2022-05-18 NOTE — ED PROVIDER NOTES
57-year-old female presenting from home with concern about abdominal cramping associated with it. Started on  and she feels little heavier than usual.  She also feels a bit lightheaded. Has not passed out, no chest pain, no abdominal pain otherwise. She is tolerating oral intake without any difficulties. Just recently started seeing a new OB/GYN. This is a new problem, persistent, mild to moderate severity, a few days duration, associate with lightheadedness. No family history on file. Past Surgical History:   Procedure Laterality Date     SECTION N/A 2019     SECTION performed by Flavio Ray MD at [facility] L[facility]D    TONSILLECTOMY         Review of Systems   Constitutional: Negative for chills, fever and unexpected weight change. Cardiovascular: Negative for chest pain. Gastrointestinal: Positive for abdominal pain. Genitourinary: Positive for vaginal bleeding. Neurological: Positive for light-headedness. All other systems reviewed and are negative. Physical Exam  Constitutional:       General: She is not in acute distress. Appearance: She is well-developed. HENT:      Head: Normocephalic and atraumatic. Eyes:      Conjunctiva/sclera: Conjunctivae normal.      Pupils: Pupils are equal, round, and reactive to light. Neck:      Thyroid: No thyromegaly. Cardiovascular:      Rate and Rhythm: Normal rate and regular rhythm. Pulmonary:      Effort: Pulmonary effort is normal. No respiratory distress. Breath sounds: Normal breath sounds. Abdominal:      General: There is no distension. Palpations: Abdomen is soft. Tenderness: There is no abdominal tenderness. There is no guarding or rebound. Musculoskeletal:         General: No tenderness. Normal range of motion. Cervical back: Normal range of motion. Skin:     General: Skin is warm and dry. Findings: No erythema.    Neurological:      Mental Status: She is alert and oriented to person, place, and time. Cranial Nerves: No cranial nerve deficit. Coordination: Coordination normal.          Procedures     MDM   ED Course as of 22 8727   Wed May 18, 2022   0915 Patient advises that she needs to leave. She understands that this is prior to the complete evaluation and treatment. She understands she knows return to the ED for new problems or any worsening. No acute abnormality seen immediately, she did start seeing a new gynecologist. [SO]      ED Course User Index  [SO] Britni Loza, DO       --------------------------------------------- PAST HISTORY ---------------------------------------------  Past Medical History:  has a past medical history of Asthma. Past Surgical History:  has a past surgical history that includes Tonsillectomy and  section (N/A, 2019). Social History:  reports that she has never smoked. She has never used smokeless tobacco. She reports that she does not drink alcohol and does not use drugs. Family History: family history is not on file. The patients home medications have been reviewed.     Allergies: Norco [hydrocodone-acetaminophen] and Penicillins    -------------------------------------------------- RESULTS -------------------------------------------------  Labs:  Results for orders placed or performed during the hospital encounter of 22   Urinalysis with Microscopic   Result Value Ref Range    Color, UA FLOWER (A) Straw/Yellow    Clarity, UA CLOUDY (A) Clear    Glucose, Ur Negative Negative mg/dL    Bilirubin Urine Negative Negative    Ketones, Urine Negative Negative mg/dL    Specific Gravity, UA >=1.030 1.005 - 1.030    Blood, Urine LARGE (A) Negative    pH, UA 5.5 5.0 - 9.0    Protein, UA 30 (A) Negative mg/dL    Urobilinogen, Urine 2.0 (A) <2.0 E.U./dL    Nitrite, Urine Negative Negative    Leukocyte Esterase, Urine Negative Negative    WBC, UA 0-1 0 - 5 /HPF    RBC, UA >20 0 - 2 /HPF    Epithelial Cells, UA RARE /HPF    Bacteria, UA RARE (A) None Seen /HPF   CBC with Auto Differential   Result Value Ref Range    WBC 12.5 (H) 4.5 - 11.5 E9/L    RBC 4.00 3.50 - 5.50 E12/L    Hemoglobin 10.7 (L) 11.5 - 15.5 g/dL    Hematocrit 35.2 34.0 - 48.0 %    MCV 88.0 80.0 - 99.9 fL    MCH 26.8 26.0 - 35.0 pg    MCHC 30.4 (L) 32.0 - 34.5 %    RDW 13.2 11.5 - 15.0 fL    Platelets 998 362 - 771 E9/L    MPV 9.2 7.0 - 12.0 fL    Neutrophils % 69.4 43.0 - 80.0 %    Immature Granulocytes % 0.3 0.0 - 5.0 %    Lymphocytes % 22.7 20.0 - 42.0 %    Monocytes % 5.2 2.0 - 12.0 %    Eosinophils % 1.8 0.0 - 6.0 %    Basophils % 0.6 0.0 - 2.0 %    Neutrophils Absolute 8.70 (H) 1.80 - 7.30 E9/L    Immature Granulocytes # 0.04 E9/L    Lymphocytes Absolute 2.85 1.50 - 4.00 E9/L    Monocytes Absolute 0.65 0.10 - 0.95 E9/L    Eosinophils Absolute 0.23 0.05 - 0.50 E9/L    Basophils Absolute 0.07 0.00 - 0.20 E9/L   Comprehensive Metabolic Panel w/ Reflex to MG   Result Value Ref Range    Sodium 139 132 - 146 mmol/L    Potassium reflex Magnesium 3.8 3.5 - 5.0 mmol/L    Chloride 105 98 - 107 mmol/L    CO2 26 22 - 29 mmol/L    Anion Gap 8 7 - 16 mmol/L    Glucose 103 (H) 74 - 99 mg/dL    BUN 12 6 - 20 mg/dL    CREATININE 0.7 0.5 - 1.0 mg/dL    GFR Non-African American >60 >=60 mL/min/1.73    GFR African American >60     Calcium 9.0 8.6 - 10.2 mg/dL    Total Protein 7.7 6.4 - 8.3 g/dL    Albumin 4.1 3.5 - 5.2 g/dL    Total Bilirubin <0.2 0.0 - 1.2 mg/dL    Alkaline Phosphatase 81 35 - 104 U/L    ALT 13 0 - 32 U/L    AST 14 0 - 31 U/L   Lipase   Result Value Ref Range    Lipase 35 13 - 60 U/L   POC Pregnancy Urine   Result Value Ref Range    HCG, Urine, POC Negative Negative    Lot Number [id number]     Positive QC Pass/Fail Pass     Negative QC Pass/Fail Pass        Radiology:  No orders to display       ------------------------- NURSING NOTES AND VITALS REVIEWED ---------------------------  Date / Time Roomed:  5/18/2022  4:12 PM  ED Bed Assignment: [id number]    The nursing notes within the ED encounter and vital signs as below have been reviewed. /61   Pulse 79   Temp 97.3 °F (36.3 °C) (Infrared)   Resp 18   Ht 5' 5\" (1.651 m)   Wt 215 lb (97.5 kg)   LMP 05/18/2022   SpO2 100%   BMI 35.78 kg/m²   Oxygen Saturation Interpretation: Normal      ------------------------------------------ PROGRESS NOTES ------------------------------------------  I have spoken with the patient and discussed todays results, in addition to providing specific details for the plan of care and counseling regarding the diagnosis and prognosis. Their questions are answered at this time and they are agreeable with the plan. I discussed at length with them reasons for immediate return here for re evaluation. They will followup with primary care by calling their office tomorrow. --------------------------------- ADDITIONAL PROVIDER NOTES ---------------------------------  At this time the patient is without objective evidence of an acute process requiring hospitalization or inpatient management. They have remained hemodynamically stable throughout their entire ED visit and are stable for discharge with outpatient follow-up. The plan has been discussed in detail and they are aware of the specific conditions for emergent return, as well as the importance of follow-up. Discharge Medication List as of 5/18/2022  5:57 PM          Diagnosis:  1. Lightheadedness        Disposition:  Patient's disposition: Discharge to home  Patient's condition is stable.            An Hoffman DO  05/18/22 6231

## 2022-06-15 PROCEDURE — 96372 THER/PROPH/DIAG INJ SC/IM: CPT

## 2022-06-15 PROCEDURE — 99284 EMERGENCY DEPT VISIT MOD MDM: CPT

## 2022-06-16 ENCOUNTER — HOSPITAL ENCOUNTER (EMERGENCY)
Age: 22
Discharge: HOME | End: 2022-06-16
Attending: EMERGENCY MEDICINE
Payer: MEDICAID

## 2022-06-16 VITALS
RESPIRATION RATE: 18 BRPM | HEART RATE: 73 BPM | HEIGHT: 65 IN | SYSTOLIC BLOOD PRESSURE: 91 MMHG | BODY MASS INDEX: 36.65 KG/M2 | TEMPERATURE: 98 F | WEIGHT: 220 LBS | OXYGEN SATURATION: 100 % | DIASTOLIC BLOOD PRESSURE: 43 MMHG

## 2022-06-16 DIAGNOSIS — N30.00 ACUTE CYSTITIS WITHOUT HEMATURIA: Primary | ICD-10-CM

## 2022-06-16 LAB
ALBUMIN SERPL-MCNC: 4.2 G/DL (ref 3.5–5.2)
ALP BLD-CCNC: 72 U/L (ref 35–104)
ALT SERPL-CCNC: 12 U/L (ref 0–32)
ANION GAP SERPL CALCULATED.3IONS-SCNC: 8 MMOL/L (ref 7–16)
AST SERPL-CCNC: 15 U/L (ref 0–31)
BACTERIA: ABNORMAL /HPF
BASOPHILS ABSOLUTE: 0.07 E9/L (ref 0–0.2)
BASOPHILS RELATIVE PERCENT: 0.6 % (ref 0–2)
BILIRUB SERPL-MCNC: <0.2 MG/DL (ref 0–1.2)
BILIRUBIN URINE: NEGATIVE
BLOOD, URINE: NEGATIVE
BUN BLDV-MCNC: 13 MG/DL (ref 6–20)
CALCIUM SERPL-MCNC: 8.9 MG/DL (ref 8.6–10.2)
CHLORIDE BLD-SCNC: 105 MMOL/L (ref 98–107)
CLARITY: ABNORMAL
CO2: 25 MMOL/L (ref 22–29)
COLOR: YELLOW
CREAT SERPL-MCNC: 0.8 MG/DL (ref 0.5–1)
EOSINOPHILS ABSOLUTE: 0.2 E9/L (ref 0.05–0.5)
EOSINOPHILS RELATIVE PERCENT: 1.6 % (ref 0–6)
EPITHELIAL CELLS, UA: ABNORMAL /HPF
GFR AFRICAN AMERICAN: >60
GFR NON-AFRICAN AMERICAN: >60 ML/MIN/1.73
GLUCOSE BLD-MCNC: 91 MG/DL (ref 74–99)
GLUCOSE URINE: NEGATIVE MG/DL
HCG QUALITATIVE: NEGATIVE
HCG, URINE, POC: NEGATIVE
HCT VFR BLD CALC: 31.1 % (ref 34–48)
HEMOGLOBIN: 9.6 G/DL (ref 11.5–15.5)
IMMATURE GRANULOCYTES #: 0.06 E9/L
IMMATURE GRANULOCYTES %: 0.5 % (ref 0–5)
KETONES, URINE: NEGATIVE MG/DL
LEUKOCYTE ESTERASE, URINE: ABNORMAL
LYMPHOCYTES ABSOLUTE: 3.51 E9/L (ref 1.5–4)
LYMPHOCYTES RELATIVE PERCENT: 28.4 % (ref 20–42)
Lab: NORMAL
MCH RBC QN AUTO: 25.9 PG (ref 26–35)
MCHC RBC AUTO-ENTMCNC: 30.9 % (ref 32–34.5)
MCV RBC AUTO: 83.8 FL (ref 80–99.9)
MONOCYTES ABSOLUTE: 0.65 E9/L (ref 0.1–0.95)
MONOCYTES RELATIVE PERCENT: 5.3 % (ref 2–12)
NEGATIVE QC PASS/FAIL: NORMAL
NEUTROPHILS ABSOLUTE: 7.87 E9/L (ref 1.8–7.3)
NEUTROPHILS RELATIVE PERCENT: 63.6 % (ref 43–80)
NITRITE, URINE: NEGATIVE
PDW BLD-RTO: 13.6 FL (ref 11.5–15)
PH UA: 7.5 (ref 5–9)
PLATELET # BLD: 378 E9/L (ref 130–450)
PMV BLD AUTO: 8.7 FL (ref 7–12)
POSITIVE QC PASS/FAIL: NORMAL
POTASSIUM SERPL-SCNC: 4.1 MMOL/L (ref 3.5–5)
PROTEIN UA: NEGATIVE MG/DL
RBC # BLD: 3.71 E12/L (ref 3.5–5.5)
RBC UA: ABNORMAL /HPF (ref 0–2)
SODIUM BLD-SCNC: 138 MMOL/L (ref 132–146)
SPECIFIC GRAVITY UA: 1.01 (ref 1–1.03)
TOTAL PROTEIN: 7.2 G/DL (ref 6.4–8.3)
UROBILINOGEN, URINE: 0.2 E.U./DL
WBC # BLD: 12.4 E9/L (ref 4.5–11.5)
WBC UA: ABNORMAL /HPF (ref 0–5)

## 2022-06-16 PROCEDURE — 6360000002 HC RX W HCPCS: Performed by: EMERGENCY MEDICINE

## 2022-06-16 PROCEDURE — 81001 URINALYSIS AUTO W/SCOPE: CPT

## 2022-06-16 PROCEDURE — 85025 COMPLETE CBC W/AUTO DIFF WBC: CPT

## 2022-06-16 PROCEDURE — 80053 COMPREHEN METABOLIC PANEL: CPT

## 2022-06-16 PROCEDURE — 36415 COLL VENOUS BLD VENIPUNCTURE: CPT

## 2022-06-16 PROCEDURE — 6370000000 HC RX 637 (ALT 250 FOR IP): Performed by: EMERGENCY MEDICINE

## 2022-06-16 PROCEDURE — 84703 CHORIONIC GONADOTROPIN ASSAY: CPT

## 2022-06-16 RX ORDER — NITROFURANTOIN 25; 75 MG/1; MG/1
100 CAPSULE ORAL 2 TIMES DAILY
Qty: 10 CAPSULE | Refills: 0 | Status: SHIPPED | OUTPATIENT
Start: 2022-06-16 | End: 2022-06-21

## 2022-06-16 RX ORDER — ORPHENADRINE CITRATE 30 MG/ML
60 INJECTION INTRAMUSCULAR; INTRAVENOUS ONCE
Status: COMPLETED | OUTPATIENT
Start: 2022-06-16 | End: 2022-06-16

## 2022-06-16 RX ORDER — NITROFURANTOIN 25; 75 MG/1; MG/1
100 CAPSULE ORAL ONCE
Status: COMPLETED | OUTPATIENT
Start: 2022-06-16 | End: 2022-06-16

## 2022-06-16 RX ADMIN — ORPHENADRINE CITRATE 60 MG: 30 INJECTION INTRAMUSCULAR; INTRAVENOUS at 01:49

## 2022-06-16 RX ADMIN — NITROFURANTOIN (MONOHYDRATE/MACROCRYSTALS) 100 MG: 75; 25 CAPSULE ORAL at 02:51

## 2022-06-16 ASSESSMENT — ENCOUNTER SYMPTOMS
CHEST TIGHTNESS: 0
SHORTNESS OF BREATH: 0

## 2022-06-16 ASSESSMENT — PAIN DESCRIPTION - LOCATION
LOCATION: ABDOMEN
LOCATION: ABDOMEN

## 2022-06-16 ASSESSMENT — PAIN - FUNCTIONAL ASSESSMENT
PAIN_FUNCTIONAL_ASSESSMENT: 0-10
PAIN_FUNCTIONAL_ASSESSMENT: 0-10

## 2022-06-16 ASSESSMENT — PAIN SCALES - GENERAL
PAINLEVEL_OUTOF10: 8
PAINLEVEL_OUTOF10: 4
PAINLEVEL_OUTOF10: 9

## 2022-06-16 ASSESSMENT — PAIN DESCRIPTION - ORIENTATION: ORIENTATION: LOWER

## 2022-06-16 NOTE — ED PROVIDER NOTES
74-year-old female presenting with lower quad abdominal pain. She always gets this pain when she has her period, her period started 3 days ago. Secondarily, she complaining of a cramping sensation in bilateral lower legs. No recent travel or surgeries, no shortness of breath or trouble breathing. This is a recurrent problem, intermittent, associate with her period, mild severity, improves when the period is over. She has been evaluated by her OB/GYN and they have not given her medications for. History reviewed. No pertinent family history. Past Surgical History:   Procedure Laterality Date     SECTION N/A 2019     SECTION performed by Kash Santana MD at [facility] L&D    TONSILLECTOMY         Review of Systems   Constitutional: Negative for chills and fever. Respiratory: Negative for chest tightness and shortness of breath. Genitourinary: Positive for menstrual problem. Musculoskeletal:        Cramping in the legs   All other systems reviewed and are negative. Physical Exam  Constitutional:       General: She is not in acute distress. Appearance: She is well-developed. HENT:      Head: Normocephalic and atraumatic. Eyes:      Conjunctiva/sclera: Conjunctivae normal.      Pupils: Pupils are equal, round, and reactive to light. Neck:      Thyroid: No thyromegaly. Cardiovascular:      Rate and Rhythm: Normal rate and regular rhythm. Pulmonary:      Effort: Pulmonary effort is normal. No respiratory distress. Breath sounds: Normal breath sounds. Abdominal:      General: There is no distension. Palpations: Abdomen is soft. Tenderness: There is no abdominal tenderness. There is no guarding or rebound. Musculoskeletal:         General: No tenderness. Normal range of motion. Cervical back: Normal range of motion. Skin:     General: Skin is warm and dry. Findings: No erythema.    Neurological:      Mental Status: She is alert and oriented to person, place, and time. Cranial Nerves: No cranial nerve deficit. Coordination: Coordination normal.          Procedures     [facility]              --------------------------------------------- PAST HISTORY ---------------------------------------------  Past Medical History:  has a past medical history of Asthma. Past Surgical History:  has a past surgical history that includes Tonsillectomy and  section (N/A, 2019). Social History:  reports that she has never smoked. She has never used smokeless tobacco. She reports that she does not drink alcohol and does not use drugs. Family History: family history is not on file. The patients home medications have been reviewed.     Allergies: Norco [hydrocodone-acetaminophen] and Penicillins    -------------------------------------------------- RESULTS -------------------------------------------------  Labs:  Results for orders placed or performed during the hospital encounter of 22   Urinalysis with Microscopic   Result Value Ref Range    Color, UA Yellow Straw/Yellow    Clarity, UA CLOUDY (A) Clear    Glucose, Ur Negative Negative mg/dL    Bilirubin Urine Negative Negative    Ketones, Urine Negative Negative mg/dL    Specific Gravity, UA 1.015 1.005 - 1.030    Blood, Urine Negative Negative    pH, UA 7.5 5.0 - 9.0    Protein, UA Negative Negative mg/dL    Urobilinogen, Urine 0.2 <2.0 E.U./dL    Nitrite, Urine Negative Negative    Leukocyte Esterase, Urine LARGE (A) Negative    WBC, UA 1-3 0 - 5 /HPF    RBC, UA NONE 0 - 2 /HPF    Epithelial Cells, UA RARE /HPF    Bacteria, UA NONE SEEN None Seen /HPF   CBC with Auto Differential   Result Value Ref Range    WBC 12.4 (H) 4.5 - 11.5 E9/L    RBC 3.71 3.50 - 5.50 E12/L    Hemoglobin 9.6 (L) 11.5 - 15.5 g/dL    Hematocrit 31.1 (L) 34.0 - 48.0 %    MCV 83.8 80.0 - 99.9 fL    MCH 25.9 (L) 26.0 - 35.0 pg    MCHC 30.9 (L) 32.0 - 34.5 %    RDW 13.6 11.5 - 15.0 fL    Platelets 589 253 - 450 E9/L    MPV 8.7 7.0 - 12.0 fL    Neutrophils % 63.6 43.0 - 80.0 %    Immature Granulocytes % 0.5 0.0 - 5.0 %    Lymphocytes % 28.4 20.0 - 42.0 %    Monocytes % 5.3 2.0 - 12.0 %    Eosinophils % 1.6 0.0 - 6.0 %    Basophils % 0.6 0.0 - 2.0 %    Neutrophils Absolute 7.87 (H) 1.80 - 7.30 E9/L    Immature Granulocytes # 0.06 E9/L    Lymphocytes Absolute 3.51 1.50 - 4.00 E9/L    Monocytes Absolute 0.65 0.10 - 0.95 E9/L    Eosinophils Absolute 0.20 0.05 - 0.50 E9/L    Basophils Absolute 0.07 0.00 - 0.20 E9/L   Comprehensive Metabolic Panel   Result Value Ref Range    Sodium 138 132 - 146 mmol/L    Potassium 4.1 3.5 - 5.0 mmol/L    Chloride 105 98 - 107 mmol/L    CO2 25 22 - 29 mmol/L    Anion Gap 8 7 - 16 mmol/L    Glucose 91 74 - 99 mg/dL    BUN 13 6 - 20 mg/dL    CREATININE 0.8 0.5 - 1.0 mg/dL    GFR Non-African American >60 >=60 mL/min/1.73    GFR African American >60     Calcium 8.9 8.6 - 10.2 mg/dL    Total Protein 7.2 6.4 - 8.3 g/dL    Albumin 4.2 3.5 - 5.2 g/dL    Total Bilirubin <0.2 0.0 - 1.2 mg/dL    Alkaline Phosphatase 72 35 - 104 U/L    ALT 12 0 - 32 U/L    AST 15 0 - 31 U/L   HCG, SERUM, QUALITATIVE   Result Value Ref Range    hCG Qual NEGATIVE NEGATIVE   POC Pregnancy Urine   Result Value Ref Range    HCG, Urine, POC Negative Negative    Lot Number [id number]     Positive QC Pass/Fail Pass     Negative QC Pass/Fail Pass        Radiology:  No orders to display       ------------------------- NURSING NOTES AND VITALS REVIEWED ---------------------------  Date / Time Roomed:  6/16/2022 12:02 AM  ED Bed Assignment:  22/22    The nursing notes within the ED encounter and vital signs as below have been reviewed.    BP (!) 91/43   Pulse 73   Temp 98 °F (36.7 °C) (Oral)   Resp 18   Ht 5' 5\" (1.651 m)   Wt 220 lb (99.8 kg)   LMP 05/18/2022   SpO2 100%   BMI 36.61 kg/m²   Oxygen Saturation Interpretation: Normal      ------------------------------------------ PROGRESS NOTES ------------------------------------------  I have spoken with the patient and discussed todays results, in addition to providing specific details for the plan of care and counseling regarding the diagnosis and prognosis. Their questions are answered at this time and they are agreeable with the plan. I discussed at length with them reasons for immediate return here for re evaluation. They will followup with primary care by calling their office tomorrow. --------------------------------- ADDITIONAL PROVIDER NOTES ---------------------------------  At this time the patient is without objective evidence of an acute process requiring hospitalization or inpatient management. They have remained hemodynamically stable throughout their entire ED visit and are stable for discharge with outpatient follow-up. The plan has been discussed in detail and they are aware of the specific conditions for emergent return, as well as the importance of follow-up. Discharge Medication List as of 6/16/2022  3:11 AM      START taking these medications    Details   nitrofurantoin, macrocrystal-monohydrate, (MACROBID) 100 MG capsule Take 1 capsule by mouth 2 times daily for 5 days, Disp-10 capsule, R-0Normal             Diagnosis:  1. Acute cystitis without hematuria        Disposition:  Patient's disposition: Discharge to home  Patient's condition is stable.                 Harleen Funes DO  06/17/22 0600

## 2022-06-16 NOTE — ED NOTES
Department of Emergency Medicine  FIRST PROVIDER TRIAGE NOTE             Independent MLP           6/15/22  10:17 PM EDT    Date of Encounter: 6/15/22   MRN: [id number]      HPI: Kita Vidales is a 20 y.o. female who presents to the ED for abdominal pain , nausea, vomiting, x 3 days    ROS: Negative for cp, sob, urinary complaints or vaginal bleeding or vaginal discharge. PE: Gen Appearance/Constitutional: alert  HEENT: NC/NT. PERRLA,  Airway patent. CV: regular rate  Pulm: respirations easy and unlabored  GI: [id number]    Vitals:    06/15/22 2157   Pulse: 78   Resp: 18   SpO2: 98%       Past medical history, surgical history, and medications reviewed. Initial Plan of Care: All treatment areas within department are currently occupied. Plan: awaiting opening in ED  Initiate treatment/testing, proceed to treatment area when bed available for ED Attending/MLP to continue care.     Electronically signed by PAZ Sandhu CNP   DD: 6/15/22           PAZ Sandhu CNP  06/15/22 [id number]

## 2022-06-16 NOTE — ED NOTES
Patient states that she is suppose to be on her period two days ago but has not had it yet.       Suzie Simeon RN  06/16/22 7240

## 2022-06-16 NOTE — Clinical Note
Kenyon Lee was seen and treated in our emergency department on 6/15/2022. She may return to work on 06/17/2022. If you have any questions or concerns, please don't hesitate to call.       Pia [place], DO

## 2022-06-16 NOTE — Clinical Note
Kenyon Carmichael was seen and treated in our emergency department on 6/15/2022. She may return to work on 06/17/2022. If you have any questions or concerns, please don't hesitate to call.        [place], DO

## 2022-08-09 ENCOUNTER — HOSPITAL ENCOUNTER (EMERGENCY)
Age: 22
Discharge: HOME | End: 2022-08-09
Attending: EMERGENCY MEDICINE
Payer: MEDICAID

## 2022-08-09 ENCOUNTER — APPOINTMENT (OUTPATIENT)
Dept: GENERAL RADIOLOGY | Age: 22
End: 2022-08-09
Payer: MEDICAID

## 2022-08-09 VITALS
SYSTOLIC BLOOD PRESSURE: 116 MMHG | HEART RATE: 74 BPM | DIASTOLIC BLOOD PRESSURE: 74 MMHG | RESPIRATION RATE: 18 BRPM | TEMPERATURE: 97.7 F | OXYGEN SATURATION: 99 %

## 2022-08-09 DIAGNOSIS — R07.89 CHEST WALL PAIN: Primary | ICD-10-CM

## 2022-08-09 LAB — SARS-COV-2, NAAT: NOT DETECTED

## 2022-08-09 PROCEDURE — 87635 SARS-COV-2 COVID-19 AMP PRB: CPT

## 2022-08-09 PROCEDURE — 93005 ELECTROCARDIOGRAM TRACING: CPT | Performed by: EMERGENCY MEDICINE

## 2022-08-09 PROCEDURE — 71046 X-RAY EXAM CHEST 2 VIEWS: CPT

## 2022-08-09 PROCEDURE — 99285 EMERGENCY DEPT VISIT HI MDM: CPT

## 2022-08-09 ASSESSMENT — ENCOUNTER SYMPTOMS
SORE THROAT: 0
ABDOMINAL PAIN: 0
SINUS PRESSURE: 0
EYE DISCHARGE: 0
EYE REDNESS: 0
NAUSEA: 1
DIARRHEA: 0
WHEEZING: 0
EYE PAIN: 0
BACK PAIN: 0
VOMITING: 0
COUGH: 0
SHORTNESS OF BREATH: 1
ABDOMINAL DISTENTION: 0

## 2022-08-09 NOTE — ED PROVIDER NOTES
Patient states last night, onset of chest discomfort, especially with breathing. Shortness of breath, especially with exertion. And nausea but, no emesis. No fever or chills. The history is provided by the patient. Chest Pain  Pain location:  Substernal area  Pain quality: aching    Pain radiates to:  Does not radiate  Pain severity:  Mild  Onset quality:  Sudden  Duration:  1 day  Timing:  Constant  Progression:  Waxing and waning  Chronicity:  New  Context: breathing    Relieved by:  None tried  Worsened by:  Nothing  Ineffective treatments:  None tried  Associated symptoms: fatigue, nausea and shortness of breath    Associated symptoms: no abdominal pain, no altered mental status, no back pain, no cough, no fever, no headache, no numbness, no vomiting and no weakness       Review of Systems   Constitutional:  Positive for fatigue. Negative for chills and fever. HENT:  Negative for ear pain, sinus pressure and sore throat. Eyes:  Negative for pain, discharge and redness. Respiratory:  Positive for shortness of breath. Negative for cough and wheezing. Cardiovascular:  Positive for chest pain. Gastrointestinal:  Positive for nausea. Negative for abdominal distention, abdominal pain, diarrhea and vomiting. Genitourinary:  Negative for dysuria and frequency. Musculoskeletal:  Negative for arthralgias and back pain. Skin:  Negative for rash and wound. Neurological:  Negative for weakness, numbness and headaches. Hematological:  Negative for adenopathy. All other systems reviewed and are negative. Physical Exam  Vitals and nursing note reviewed. Constitutional:       Appearance: She is well-developed. HENT:      Head: Normocephalic and atraumatic. Eyes:      Pupils: Pupils are equal, round, and reactive to light. Cardiovascular:      Rate and Rhythm: Normal rate and regular rhythm. Heart sounds: Normal heart sounds. No murmur heard.   Pulmonary:      Effort: Pulmonary effort is normal. No respiratory distress. Breath sounds: Normal breath sounds. No decreased breath sounds, wheezing, rhonchi or rales. Chest:      Chest wall: Tenderness present. Abdominal:      General: Bowel sounds are normal.      Palpations: Abdomen is soft. Tenderness: There is no abdominal tenderness. There is no guarding or rebound. Musculoskeletal:      Cervical back: Normal range of motion and neck supple. Skin:     General: Skin is warm and dry. Neurological:      Mental Status: She is alert and oriented to person, place, and time. Cranial Nerves: No cranial nerve deficit. Coordination: Coordination normal.        Procedures     MDM       EKG: This EKG is signed and interpreted by me. Rate: 70  Rhythm: Sinus  Interpretation: no acute changes  Comparison: stable as compared to patient's most recent EKG           --------------------------------------------- PAST HISTORY ---------------------------------------------  Past Medical History:  has a past medical history of Asthma. Past Surgical History:  has a past surgical history that includes Tonsillectomy and  section (N/A, 2019). Social History:  reports that she has never smoked. She has never used smokeless tobacco. She reports that she does not drink alcohol and does not use drugs. Family History: family history is not on file. The patients home medications have been reviewed.     Allergies: Norco [hydrocodone-acetaminophen] and Penicillins    -------------------------------------------------- RESULTS -------------------------------------------------  Labs:  Results for orders placed or performed during the hospital encounter of 22   COVID-19, Rapid    Specimen: Nasopharyngeal Swab   Result Value Ref Range    SARS-CoV-2, NAAT Not Detected Not Detected   EKG 12 Lead   Result Value Ref Range    Ventricular Rate 70 BPM    Atrial Rate 70 BPM    P-R Interval 140 ms    QRS Duration 78 ms    Q-T Interval 390 ms    QTc Calculation (Bazett) 421 ms    P Axis 37 degrees    R Axis 27 degrees    T Axis 39 degrees       Radiology:  XR CHEST (2 VW)   Final Result   No acute process. ------------------------- NURSING NOTES AND VITALS REVIEWED ---------------------------  Date / Time Roomed:  8/9/2022  2:20 PM  ED Bed Assignment:  24/24    The nursing notes within the ED encounter and vital signs as below have been reviewed. /74   Pulse 74   Temp 97.7 °F (36.5 °C) (Oral)   Resp 18   SpO2 99%   Oxygen Saturation Interpretation: Normal      ------------------------------------------ PROGRESS NOTES ------------------------------------------  4:49 PM EDT  I have spoken with the patient and discussed todays results, in addition to providing specific details for the plan of care and counseling regarding the diagnosis and prognosis. Their questions are answered at this time and they are agreeable with the plan. I discussed at length with them reasons for immediate return here for re evaluation. They will followup with their primary care physician by calling their office tomorrow. --------------------------------- ADDITIONAL PROVIDER NOTES ---------------------------------  At this time the patient is without objective evidence of an acute process requiring hospitalization or inpatient management. They have remained hemodynamically stable throughout their entire ED visit and are stable for discharge with outpatient follow-up. The plan has been discussed in detail and they are aware of the specific conditions for emergent return, as well as the importance of follow-up. New Prescriptions    No medications on file       Diagnosis:  1. Chest wall pain        Disposition:  Patient's disposition: Discharge to home  Patient's condition is stable.          Juju Creamer, [place]  08/09/22 3524

## 2022-08-09 NOTE — Clinical Note
Kenyon Bradfordángel Franco was seen and treated in our emergency department on 8/9/2022. She may return to work on 08/10/2022. If you have any questions or concerns, please don't hesitate to call.       Alexandru Listen, DO

## 2022-08-10 LAB
EKG ATRIAL RATE: 70 BPM
EKG P AXIS: 37 DEGREES
EKG P-R INTERVAL: 140 MS
EKG Q-T INTERVAL: 390 MS
EKG QRS DURATION: 78 MS
EKG QTC CALCULATION (BAZETT): 421 MS
EKG R AXIS: 27 DEGREES
EKG T AXIS: 39 DEGREES
EKG VENTRICULAR RATE: 70 BPM

## 2022-09-18 ENCOUNTER — HOSPITAL ENCOUNTER (EMERGENCY)
Age: 22
Discharge: HOME | End: 2022-09-18
Payer: MEDICAID

## 2022-09-18 VITALS
BODY MASS INDEX: 35.3 KG/M2 | WEIGHT: 212.13 LBS | RESPIRATION RATE: 16 BRPM | HEART RATE: 72 BPM | DIASTOLIC BLOOD PRESSURE: 59 MMHG | SYSTOLIC BLOOD PRESSURE: 109 MMHG | OXYGEN SATURATION: 100 % | TEMPERATURE: 98.6 F

## 2022-09-18 DIAGNOSIS — J06.9 VIRAL URI: ICD-10-CM

## 2022-09-18 DIAGNOSIS — N94.6 DYSMENORRHEA: Primary | ICD-10-CM

## 2022-09-18 LAB
ALBUMIN SERPL-MCNC: 4.2 G/DL (ref 3.5–5.2)
ALP BLD-CCNC: 69 U/L (ref 35–104)
ALT SERPL-CCNC: 8 U/L (ref 0–32)
ANION GAP SERPL CALCULATED.3IONS-SCNC: 10 MMOL/L (ref 7–16)
AST SERPL-CCNC: 14 U/L (ref 0–31)
BACTERIA: ABNORMAL /HPF
BASOPHILS ABSOLUTE: 0.06 E9/L (ref 0–0.2)
BASOPHILS RELATIVE PERCENT: 0.7 % (ref 0–2)
BILIRUB SERPL-MCNC: <0.2 MG/DL (ref 0–1.2)
BILIRUBIN URINE: NEGATIVE
BLOOD, URINE: NEGATIVE
BUN BLDV-MCNC: 15 MG/DL (ref 6–20)
CALCIUM SERPL-MCNC: 8.9 MG/DL (ref 8.6–10.2)
CHLORIDE BLD-SCNC: 101 MMOL/L (ref 98–107)
CLARITY: CLEAR
CO2: 24 MMOL/L (ref 22–29)
COLOR: YELLOW
CREAT SERPL-MCNC: 0.8 MG/DL (ref 0.5–1)
EOSINOPHILS ABSOLUTE: 0.14 E9/L (ref 0.05–0.5)
EOSINOPHILS RELATIVE PERCENT: 1.7 % (ref 0–6)
EPITHELIAL CELLS, UA: ABNORMAL /HPF
GFR AFRICAN AMERICAN: >60
GFR NON-AFRICAN AMERICAN: >60 ML/MIN/1.73
GLUCOSE BLD-MCNC: 88 MG/DL (ref 74–99)
GLUCOSE URINE: NEGATIVE MG/DL
HCG, URINE, POC: NEGATIVE
HCT VFR BLD CALC: 33.4 % (ref 34–48)
HEMOGLOBIN: 10.3 G/DL (ref 11.5–15.5)
IMMATURE GRANULOCYTES #: 0.03 E9/L
IMMATURE GRANULOCYTES %: 0.4 % (ref 0–5)
KETONES, URINE: NEGATIVE MG/DL
LACTIC ACID: 0.8 MMOL/L (ref 0.5–2.2)
LEUKOCYTE ESTERASE, URINE: NEGATIVE
LIPASE: 35 U/L (ref 13–60)
LYMPHOCYTES ABSOLUTE: 1.57 E9/L (ref 1.5–4)
LYMPHOCYTES RELATIVE PERCENT: 18.8 % (ref 20–42)
Lab: NORMAL
MCH RBC QN AUTO: 25.2 PG (ref 26–35)
MCHC RBC AUTO-ENTMCNC: 30.8 % (ref 32–34.5)
MCV RBC AUTO: 81.7 FL (ref 80–99.9)
MONOCYTES ABSOLUTE: 0.55 E9/L (ref 0.1–0.95)
MONOCYTES RELATIVE PERCENT: 6.6 % (ref 2–12)
NEGATIVE QC PASS/FAIL: NORMAL
NEUTROPHILS ABSOLUTE: 5.98 E9/L (ref 1.8–7.3)
NEUTROPHILS RELATIVE PERCENT: 71.8 % (ref 43–80)
NITRITE, URINE: NEGATIVE
PDW BLD-RTO: 15 FL (ref 11.5–15)
PH UA: 7 (ref 5–9)
PLATELET # BLD: 371 E9/L (ref 130–450)
PMV BLD AUTO: 9.7 FL (ref 7–12)
POSITIVE QC PASS/FAIL: NORMAL
POTASSIUM REFLEX MAGNESIUM: 4.2 MMOL/L (ref 3.5–5)
PROTEIN UA: NEGATIVE MG/DL
RBC # BLD: 4.09 E12/L (ref 3.5–5.5)
RBC UA: ABNORMAL /HPF (ref 0–2)
SODIUM BLD-SCNC: 135 MMOL/L (ref 132–146)
SPECIFIC GRAVITY UA: 1.02 (ref 1–1.03)
TOTAL PROTEIN: 7.6 G/DL (ref 6.4–8.3)
UROBILINOGEN, URINE: 0.2 E.U./DL
WBC # BLD: 8.3 E9/L (ref 4.5–11.5)
WBC UA: ABNORMAL /HPF (ref 0–5)

## 2022-09-18 PROCEDURE — 81001 URINALYSIS AUTO W/SCOPE: CPT

## 2022-09-18 PROCEDURE — 99284 EMERGENCY DEPT VISIT MOD MDM: CPT

## 2022-09-18 PROCEDURE — 83690 ASSAY OF LIPASE: CPT

## 2022-09-18 PROCEDURE — 2580000003 HC RX 258: Performed by: PHYSICIAN ASSISTANT

## 2022-09-18 PROCEDURE — 36415 COLL VENOUS BLD VENIPUNCTURE: CPT

## 2022-09-18 PROCEDURE — 85025 COMPLETE CBC W/AUTO DIFF WBC: CPT

## 2022-09-18 PROCEDURE — 6360000002 HC RX W HCPCS: Performed by: PHYSICIAN ASSISTANT

## 2022-09-18 PROCEDURE — 83605 ASSAY OF LACTIC ACID: CPT

## 2022-09-18 PROCEDURE — 80053 COMPREHEN METABOLIC PANEL: CPT

## 2022-09-18 PROCEDURE — 96374 THER/PROPH/DIAG INJ IV PUSH: CPT

## 2022-09-18 RX ORDER — 0.9 % SODIUM CHLORIDE 0.9 %
1000 INTRAVENOUS SOLUTION INTRAVENOUS ONCE
Status: COMPLETED | OUTPATIENT
Start: 2022-09-18 | End: 2022-09-18

## 2022-09-18 RX ORDER — ONDANSETRON 2 MG/ML
4 INJECTION INTRAMUSCULAR; INTRAVENOUS ONCE
Status: COMPLETED | OUTPATIENT
Start: 2022-09-18 | End: 2022-09-18

## 2022-09-18 RX ORDER — GUAIFENESIN AND DEXTROMETHORPHAN HYDROBROMIDE 600; 30 MG/1; MG/1
1 TABLET, EXTENDED RELEASE ORAL EVERY 12 HOURS
Qty: 10 TABLET | Refills: 0 | Status: SHIPPED | OUTPATIENT
Start: 2022-09-18 | End: 2022-09-23

## 2022-09-18 RX ORDER — NAPROXEN 500 MG/1
500 TABLET ORAL 2 TIMES DAILY
Qty: 14 TABLET | Refills: 0 | Status: SHIPPED | OUTPATIENT
Start: 2022-09-18 | End: 2022-09-25

## 2022-09-18 RX ADMIN — SODIUM CHLORIDE 1000 ML: 9 INJECTION, SOLUTION INTRAVENOUS at 19:44

## 2022-09-18 RX ADMIN — ONDANSETRON 4 MG: 2 INJECTION INTRAMUSCULAR; INTRAVENOUS at 19:41

## 2022-09-18 ASSESSMENT — PAIN DESCRIPTION - LOCATION: LOCATION: ABDOMEN

## 2022-09-18 ASSESSMENT — PAIN SCALES - GENERAL: PAINLEVEL_OUTOF10: 8

## 2022-09-18 ASSESSMENT — PAIN - FUNCTIONAL ASSESSMENT: PAIN_FUNCTIONAL_ASSESSMENT: 0-10

## 2023-01-03 ENCOUNTER — HOSPITAL ENCOUNTER (EMERGENCY)
Age: 23
Discharge: HOME | End: 2023-01-03
Payer: MEDICAID

## 2023-01-03 ENCOUNTER — APPOINTMENT (OUTPATIENT)
Dept: ULTRASOUND IMAGING | Age: 23
End: 2023-01-03
Payer: MEDICAID

## 2023-01-03 VITALS
SYSTOLIC BLOOD PRESSURE: 101 MMHG | OXYGEN SATURATION: 96 % | RESPIRATION RATE: 18 BRPM | HEART RATE: 87 BPM | DIASTOLIC BLOOD PRESSURE: 52 MMHG | TEMPERATURE: 98.7 F

## 2023-01-03 DIAGNOSIS — O26.91 COMPLICATION OF PREGNANCY IN FIRST TRIMESTER: Primary | ICD-10-CM

## 2023-01-03 DIAGNOSIS — O20.0 THREATENED MISCARRIAGE IN EARLY PREGNANCY: ICD-10-CM

## 2023-01-03 LAB
ALBUMIN SERPL-MCNC: 4 G/DL (ref 3.5–5.2)
ALP BLD-CCNC: 63 U/L (ref 35–104)
ALT SERPL-CCNC: 11 U/L (ref 0–32)
ANION GAP SERPL CALCULATED.3IONS-SCNC: 10 MMOL/L (ref 7–16)
AST SERPL-CCNC: 13 U/L (ref 0–31)
BASOPHILS ABSOLUTE: 0.05 E9/L (ref 0–0.2)
BASOPHILS RELATIVE PERCENT: 0.5 % (ref 0–2)
BILIRUB SERPL-MCNC: 0.2 MG/DL (ref 0–1.2)
BILIRUBIN URINE: NEGATIVE
BLOOD, URINE: NEGATIVE
BUN BLDV-MCNC: 11 MG/DL (ref 6–20)
CALCIUM SERPL-MCNC: 9.2 MG/DL (ref 8.6–10.2)
CHLORIDE BLD-SCNC: 103 MMOL/L (ref 98–107)
CLARITY: CLEAR
CO2: 22 MMOL/L (ref 22–29)
COLOR: YELLOW
CREAT SERPL-MCNC: 0.7 MG/DL (ref 0.5–1)
EOSINOPHILS ABSOLUTE: 0.13 E9/L (ref 0.05–0.5)
EOSINOPHILS RELATIVE PERCENT: 1.3 % (ref 0–6)
GFR SERPL CREATININE-BSD FRML MDRD: >60 ML/MIN/1.73
GLUCOSE BLD-MCNC: 88 MG/DL (ref 74–99)
GLUCOSE URINE: NEGATIVE MG/DL
GONADOTROPIN, CHORIONIC (HCG) QUANT: 49.8 MIU/ML
HCG, URINE, POC: NEGATIVE
HCT VFR BLD CALC: 33.3 % (ref 34–48)
HEMOGLOBIN: 10 G/DL (ref 11.5–15.5)
IMMATURE GRANULOCYTES #: 0.05 E9/L
IMMATURE GRANULOCYTES %: 0.5 % (ref 0–5)
KETONES, URINE: NEGATIVE MG/DL
LACTIC ACID: 0.8 MMOL/L (ref 0.5–2.2)
LEUKOCYTE ESTERASE, URINE: NEGATIVE
LIPASE: 29 U/L (ref 13–60)
LYMPHOCYTES ABSOLUTE: 2.86 E9/L (ref 1.5–4)
LYMPHOCYTES RELATIVE PERCENT: 27.8 % (ref 20–42)
Lab: NORMAL
MCH RBC QN AUTO: 25.6 PG (ref 26–35)
MCHC RBC AUTO-ENTMCNC: 30 % (ref 32–34.5)
MCV RBC AUTO: 85.2 FL (ref 80–99.9)
MONOCYTES ABSOLUTE: 0.51 E9/L (ref 0.1–0.95)
MONOCYTES RELATIVE PERCENT: 5 % (ref 2–12)
NEGATIVE QC PASS/FAIL: NORMAL
NEUTROPHILS ABSOLUTE: 6.68 E9/L (ref 1.8–7.3)
NEUTROPHILS RELATIVE PERCENT: 64.9 % (ref 43–80)
NITRITE, URINE: NEGATIVE
PDW BLD-RTO: 15 FL (ref 11.5–15)
PH UA: 7 (ref 5–9)
PLATELET # BLD: 402 E9/L (ref 130–450)
PMV BLD AUTO: 9.3 FL (ref 7–12)
POSITIVE QC PASS/FAIL: NORMAL
POTASSIUM SERPL-SCNC: 4 MMOL/L (ref 3.5–5)
PROTEIN UA: NEGATIVE MG/DL
RBC # BLD: 3.91 E12/L (ref 3.5–5.5)
SODIUM BLD-SCNC: 135 MMOL/L (ref 132–146)
SPECIFIC GRAVITY UA: 1.02 (ref 1–1.03)
TOTAL PROTEIN: 7 G/DL (ref 6.4–8.3)
UROBILINOGEN, URINE: 1 E.U./DL
WBC # BLD: 10.3 E9/L (ref 4.5–11.5)

## 2023-01-03 PROCEDURE — 81003 URINALYSIS AUTO W/O SCOPE: CPT

## 2023-01-03 PROCEDURE — 76801 OB US < 14 WKS SINGLE FETUS: CPT

## 2023-01-03 PROCEDURE — 85025 COMPLETE CBC W/AUTO DIFF WBC: CPT

## 2023-01-03 PROCEDURE — 83605 ASSAY OF LACTIC ACID: CPT

## 2023-01-03 PROCEDURE — 99284 EMERGENCY DEPT VISIT MOD MDM: CPT

## 2023-01-03 PROCEDURE — 80053 COMPREHEN METABOLIC PANEL: CPT

## 2023-01-03 PROCEDURE — 84702 CHORIONIC GONADOTROPIN TEST: CPT

## 2023-01-03 PROCEDURE — 83690 ASSAY OF LIPASE: CPT

## 2023-01-03 ASSESSMENT — PAIN SCALES - GENERAL: PAINLEVEL_OUTOF10: 9

## 2023-01-03 NOTE — ED PROVIDER NOTES
Independent CARMEN Visit. HPI:  1/3/23,   Time: 3:37 PM LOBO Encinas is a 20 y.o. female presenting to the ED for abdominal pain with nausea and vomiting, beginning yesterday ago. The complaint has been intermittent, mild in severity, and worsened by nothing. Presents for complaints of lower abdominal and pelvic pain with associated nausea and vomiting but denies any diarrhea. She denies any dysuria symptoms. She states her symptoms onset was yesterday. Last menstrual cycle was on . She did not take a home pregnancy test.  She is a G1, P1. She is does see Dr. Clifton green for OB/GYN.    ROS:   Pertinent positives and negatives are stated within HPI, all other systems reviewed and are negative.  --------------------------------------------- PAST HISTORY ---------------------------------------------  Past Medical History:  has a past medical history of Asthma. Past Surgical History:  has a past surgical history that includes Tonsillectomy and  section (N/A, 2019). Social History:  reports that she has never smoked. She has never used smokeless tobacco. She reports that she does not drink alcohol and does not use drugs. Family History: family history is not on file. The patients home medications have been reviewed.     Allergies: Norco [hydrocodone-acetaminophen] and Penicillins    -------------------------------------------------- RESULTS -------------------------------------------------  All laboratory and radiology results have been personally reviewed by myself   LABS:  Results for orders placed or performed during the hospital encounter of 23   CBC with Auto Differential   Result Value Ref Range    WBC 10.3 4.5 - 11.5 E9/L    RBC 3.91 3.50 - 5.50 E12/L    Hemoglobin 10.0 (L) 11.5 - 15.5 g/dL    Hematocrit 33.3 (L) 34.0 - 48.0 %    MCV 85.2 80.0 - 99.9 fL    MCH 25.6 (L) 26.0 - 35.0 pg    MCHC 30.0 (L) 32.0 - 34.5 %    RDW 15.0 11.5 - 15.0 fL    Platelets 402 130 - 450 E9/L    MPV 9.3 7.0 - 12.0 fL    Neutrophils % 64.9 43.0 - 80.0 %    Immature Granulocytes % 0.5 0.0 - 5.0 %    Lymphocytes % 27.8 20.0 - 42.0 %    Monocytes % 5.0 2.0 - 12.0 %    Eosinophils % 1.3 0.0 - 6.0 %    Basophils % 0.5 0.0 - 2.0 %    Neutrophils Absolute 6.68 1.80 - 7.30 E9/L    Immature Granulocytes # 0.05 E9/L    Lymphocytes Absolute 2.86 1.50 - 4.00 E9/L    Monocytes Absolute 0.51 0.10 - 0.95 E9/L    Eosinophils Absolute 0.13 0.05 - 0.50 E9/L    Basophils Absolute 0.05 0.00 - 0.20 E9/L   Comprehensive Metabolic Panel   Result Value Ref Range    Sodium 135 132 - 146 mmol/L    Potassium 4.0 3.5 - 5.0 mmol/L    Chloride 103 98 - 107 mmol/L    CO2 22 22 - 29 mmol/L    Anion Gap 10 7 - 16 mmol/L    Glucose 88 74 - 99 mg/dL    BUN 11 6 - 20 mg/dL    Creatinine 0.7 0.5 - 1.0 mg/dL    Est, Glom Filt Rate >60 >=60 mL/min/1.73    Calcium 9.2 8.6 - 10.2 mg/dL    Total Protein 7.0 6.4 - 8.3 g/dL    Albumin 4.0 3.5 - 5.2 g/dL    Total Bilirubin 0.2 0.0 - 1.2 mg/dL    Alkaline Phosphatase 63 35 - 104 U/L    ALT 11 0 - 32 U/L    AST 13 0 - 31 U/L   Lactic Acid   Result Value Ref Range    Lactic Acid 0.8 0.5 - 2.2 mmol/L   Lipase   Result Value Ref Range    Lipase 29 13 - 60 U/L   Urinalysis   Result Value Ref Range    Color, UA Yellow Straw/Yellow    Clarity, UA Clear Clear    Glucose, Ur Negative Negative mg/dL    Bilirubin Urine Negative Negative    Ketones, Urine Negative Negative mg/dL    Specific Gravity, UA 1.025 1.005 - 1.030    Blood, Urine Negative Negative    pH, UA 7.0 5.0 - 9.0    Protein, UA Negative Negative mg/dL    Urobilinogen, Urine 1.0 <2.0 E.U./dL    Nitrite, Urine Negative Negative    Leukocyte Esterase, Urine Negative Negative   HCG, Quantitative, Pregnancy   Result Value Ref Range    hCG Quant 49.8 (H) <10 mIU/mL   POC Pregnancy Urine   Result Value Ref Range    HCG, Urine, POC Negative Negative    Lot Number [id number]     Positive QC Pass/Fail Pass     Negative QC Pass/Fail Pass RADIOLOGY:  Interpreted by Radiologist.  US OB LESS THAN 14 WEEKS SINGLE OR FIRST GESTATION   Final Result   No visible intrauterine pregnancy. Differential considerations include a   very early live IUP which is not yet visible, recent spontaneous ,   and the presence of an ectopic pregnancy. Small complex right ovarian cyst.      Small amount of free fluid in the cul-de-sac.             ------------------------- NURSING NOTES AND VITALS REVIEWED ---------------------------   The nursing notes within the ED encounter and vital signs as below have been reviewed. BP (!) 101/52   Pulse 87   Temp 98.7 °F (37.1 °C) (Oral)   Resp 18   LMP 2023   SpO2 96%   Oxygen Saturation Interpretation: Normal      ---------------------------------------------------PHYSICAL EXAM--------------------------------------      Constitutional/General: Alert and oriented x3, well appearing, non toxic in NAD  Head: NC/AT  Eyes: PERRL, EOMI  Mouth: Oropharynx clear, handling secretions, no trismus  Neck: Supple, full ROM, no meningeal signs  Pulmonary: Lungs clear to auscultation bilaterally, no wheezes, rales, or rhonchi. Not in respiratory distress  Cardiovascular:  Regular rate and rhythm, no murmurs, gallops, or rubs. 2+ distal pulses  Abdomen: Soft, mild tenderness to lower pelvic and suprapubic region. Non distended,   Extremities: Moves all extremities x 4. Warm and well perfused  Skin: warm and dry without rash  Neurologic: GCS 15,  Psych: Normal Affect      ------------------------------ ED COURSE/MEDICAL DECISION MAKING----------------------  Medications - No data to display      Medical Decision Making:    Presents for complaints of lower abdominal and pelvic pain with associated nausea and vomiting but denies any diarrhea. She denies any dysuria symptoms. She states her symptoms onset was yesterday. Last menstrual cycle was on . She did not take a home pregnancy test.  She is a G1, P1.   She is does see Dr. Luiz Dupree for OB/GYN. And has an upcoming appointment to see him in the next 2 weeks. Chart review noted that her blood type is A+. Her pregnancy test today is positive with a beta quant at 48 hemoglobin is at baseline at 10, she denies any vaginal bleeding. Electrolytes are all normal.  Ultrasound reveals no intrauterine pregnancy either due to early pregnancy, recent spontaneous , or early ectopic. Also noted a small complex right ovarian cyst  1600- ob/gyn attending Dr. Sara garcia, was called update on patient's clinical presentation physical exam diagnostic results advised to send the patient home with a prescription for repeat quantitative hCG in 48 hours. The patient was updated on the test results including the ultrasound results revealing a possibility of an early pregnancy but also cannot rule out an early ectopic pregnancy. If she has any worsening pain she is advised to return back to the emergency room otherwise follow-up with OB/GYN. Advised to have the repeat quantitative hCG done in 48 hours with results to go to OB/GYN. She again denies any active bleeding at this time. Counseling: The emergency provider has spoken with the patient and discussed todays results, in addition to providing specific details for the plan of care and counseling regarding the diagnosis and prognosis. Questions are answered at this time and they are agreeable with the plan.      --------------------------------- IMPRESSION AND DISPOSITION ---------------------------------    IMPRESSION  1. Complication of pregnancy in first trimester    2.  Threatened miscarriage in early pregnancy        DISPOSITION  Disposition: Discharge to home  Patient condition is good                  PAZ Diaz - LUCINDA  23 [id number]

## 2023-01-03 NOTE — ED NOTES
Urine preg poc is positive, it was negative after 3 minutes and then after a short time later faint positive line noted     Jeana Cano RN  01/03/23 [id number]

## 2023-01-31 NOTE — ED PROVIDER NOTES
Independent MLP  HPI:  21, Time: 7:45 PM LOBO Evans is a 24 y.o. female presenting to the ED for fever cough , beginning 1 day  ago. The complaint has been persistent, moderate in severity, and worsened by cough. *patient comes in with complaint of fever cough body aches that started yesterday. She has had decreased appetite states she has not been eating or drinking well denies any nausea vomiting or diarrhea. Chest discomfort with coughing. She does have history of asthma. Review of Systems:   A complete review of systems was performed and pertinent positives and negatives are stated within HPI, all other systems reviewed and are negative.          --------------------------------------------- PAST HISTORY ---------------------------------------------  Past Medical History:  has a past medical history of Asthma. Past Surgical History:  has a past surgical history that includes Tonsillectomy and  section (N/A, 2019). Social History:  reports that she has never smoked. She has never used smokeless tobacco. She reports that she does not drink alcohol and does not use drugs. Family History: family history is not on file. The patients home medications have been reviewed.     Allergies: Norco [hydrocodone-acetaminophen] and Penicillins    -------------------------------------------------- RESULTS -------------------------------------------------  All laboratory and radiology results have been personally reviewed by myself   LABS:  Results for orders placed or performed during the hospital encounter of 21   COVID-19, Rapid    Specimen: Nasopharyngeal Swab   Result Value Ref Range    SARS-CoV-2, NAAT DETECTED (A) Not Detected   CBC Auto Differential   Result Value Ref Range    WBC 3.5 (L) 4.5 - 11.5 E9/L    RBC 3.97 3.50 - 5.50 E12/L    Hemoglobin 10.8 (L) 11.5 - 15.5 g/dL    Hematocrit 35.1 34.0 - 48.0 %    MCV 88.4 80.0 - 99.9 fL    MCH 27.2 26.0 - 35.0 pg    MCHC 30.8 (L) 32.0 - 34.5 %    RDW 14.7 11.5 - 15.0 fL    Platelets 316 951 - 271 E9/L    MPV 9.4 7.0 - 12.0 fL    Neutrophils % 47.9 43.0 - 80.0 %    Immature Granulocytes % 0.3 0.0 - 5.0 %    Lymphocytes % 37.1 20.0 - 42.0 %    Monocytes % 12.4 (H) 2.0 - 12.0 %    Eosinophils % 1.4 0.0 - 6.0 %    Basophils % 0.9 0.0 - 2.0 %    Neutrophils Absolute 1.67 (L) 1.80 - 7.30 E9/L    Immature Granulocytes # 0.01 E9/L    Lymphocytes Absolute 1.29 (L) 1.50 - 4.00 E9/L    Monocytes Absolute 0.43 0.10 - 0.95 E9/L    Eosinophils Absolute 0.05 0.05 - 0.50 E9/L    Basophils Absolute 0.03 0.00 - 0.20 E9/L   Comprehensive Metabolic Panel   Result Value Ref Range    Sodium 140 132 - 146 mmol/L    Potassium 3.7 3.5 - 5.0 mmol/L    Chloride 102 98 - 107 mmol/L    CO2 26 22 - 29 mmol/L    Anion Gap 12 7 - 16 mmol/L    Glucose 79 74 - 99 mg/dL    BUN 8 6 - 20 mg/dL    CREATININE 0.8 0.5 - 1.0 mg/dL    GFR Non-African American >60 >=60 mL/min/1.73    GFR African American >60     Calcium 9.3 8.6 - 10.2 mg/dL    Total Protein 7.5 6.4 - 8.3 g/dL    Albumin 4.4 3.5 - 5.2 g/dL    Total Bilirubin 0.2 0.0 - 1.2 mg/dL    Alkaline Phosphatase 72 35 - 104 U/L    ALT 26 0 - 32 U/L    AST 24 0 - 31 U/L   Troponin   Result Value Ref Range    Troponin, High Sensitivity <6 0 - 9 ng/L   EKG 12 Lead   Result Value Ref Range    Ventricular Rate 78 BPM    Atrial Rate 78 BPM    P-R Interval 148 ms    QRS Duration 86 ms    Q-T Interval 356 ms    QTc Calculation (Bazett) 405 ms    P Axis 37 degrees    R Axis 58 degrees    T Axis 39 degrees       RADIOLOGY:  Interpreted by Radiologist.  XR CHEST (2 VW)   Final Result   No acute process. ------------------------- NURSING NOTES AND VITALS REVIEWED ---------------------------   The nursing notes within the ED encounter and vital signs as below have been reviewed.    /83   Pulse 95   Temp 96.8 °F (36 °C) (Temporal)   Resp 18   SpO2 98%   Oxygen Saturation Interpretation: Normal      ---------------------------------------------------PHYSICAL EXAM--------------------------------------      Constitutional/General: Alert and oriented x3, well appearing, non toxic in NAD  Head: Normocephalic and atraumatic  Eyes: PERRL, EOMI  Mouth: Oropharynx clear, handling secretions, no trismus  Neck: Supple, full ROM,   Pulmonary: Lungs clear to auscultation bilaterally, no wheezes, rales, or rhonchi. Not in respiratory distress  Cardiovascular:  Regular rate and rhythm, no murmurs, gallops, or rubs. 2+ distal pulses  Abdomen: Soft, non tender, non distended,   Extremities: Moves all extremities x 4. Warm and well perfused  Skin: warm and dry without rash  Neurologic: GCS 15,  Psych: Normal Affect      ------------------------------ ED COURSE/MEDICAL DECISION MAKING----------------------  Medications - No data to display      ED COURSE:    used    EKG # 1:  Interpreted by emergency department attending physician unless otherwise noted. 12/26/21  Time:1752    Rhythm: normal sinus   Rate: 78  Axis: normal  Conduction: normal  ST Segments: normal  T Waves: normal    Clinical Impression: NSR, Normal ecg  Comparison to Prior tracings: There are no significant changes when compared to prior tracings. Medical Decision Making:   Patient with complaint of generalized body aches cough. She is positive for COVID-19. Chest x-ray no pneumonia she had normal kidney function she did not appear to be dehydrated she is not tachycardic with normal blood pressure. Symptomatic treatment Tylenol for fever chills body aches she was instructed to keep well-hydrated. Patient was instructed on obtaining a pulse oximeter to monitor O2 saturations to return to the ER if they are less than 90%    Counseling:    The emergency provider has spoken with the patient and discussed todays results, in addition to providing specific details for the plan of care and counseling regarding the diagnosis and prognosis. Questions are answered at this time and they are agreeable with the plan.      --------------------------------- IMPRESSION AND DISPOSITION ---------------------------------    IMPRESSION  1. COVID-19        DISPOSITION  Disposition: Discharge to home  Patient condition is good      NOTE: This report was transcribed using voice recognition software.  Every effort was made to ensure accuracy; however, inadvertent computerized transcription errors may be present     ABDIAZIZ Hammer  12/26/21 2138       ABDIAZIZ Hammer  12/26/21 2145 (3) no apparent problem

## 2023-05-31 ENCOUNTER — HOSPITAL ENCOUNTER (EMERGENCY)
Age: 23
Discharge: HOME | End: 2023-05-31
Attending: EMERGENCY MEDICINE
Payer: MEDICAID

## 2023-05-31 VITALS
DIASTOLIC BLOOD PRESSURE: 59 MMHG | BODY MASS INDEX: 34.95 KG/M2 | TEMPERATURE: 98 F | HEART RATE: 81 BPM | SYSTOLIC BLOOD PRESSURE: 103 MMHG | RESPIRATION RATE: 20 BRPM | WEIGHT: 210 LBS | OXYGEN SATURATION: 100 %

## 2023-05-31 DIAGNOSIS — R42 DIZZINESS: Primary | ICD-10-CM

## 2023-05-31 LAB
ALBUMIN SERPL-MCNC: 4.1 G/DL (ref 3.5–5.2)
ALP SERPL-CCNC: 64 U/L (ref 35–104)
ALT SERPL-CCNC: 10 U/L (ref 0–32)
ANION GAP SERPL CALCULATED.3IONS-SCNC: 11 MMOL/L (ref 7–16)
AST SERPL-CCNC: 12 U/L (ref 0–31)
BACTERIA URNS QL MICRO: ABNORMAL /HPF
BASOPHILS # BLD: 0.06 E9/L (ref 0–0.2)
BASOPHILS NFR BLD: 0.5 % (ref 0–2)
BILIRUB SERPL-MCNC: 0.2 MG/DL (ref 0–1.2)
BILIRUB UR QL STRIP: NEGATIVE
BUN SERPL-MCNC: 9 MG/DL (ref 6–20)
CALCIUM SERPL-MCNC: 8.9 MG/DL (ref 8.6–10.2)
CHLORIDE SERPL-SCNC: 106 MMOL/L (ref 98–107)
CLARITY UR: CLEAR
CO2 SERPL-SCNC: 24 MMOL/L (ref 22–29)
COLOR UR: YELLOW
CREAT SERPL-MCNC: 0.7 MG/DL (ref 0.5–1)
EOSINOPHIL # BLD: 0.09 E9/L (ref 0.05–0.5)
EOSINOPHIL NFR BLD: 0.8 % (ref 0–6)
EPI CELLS #/AREA URNS HPF: ABNORMAL /HPF
ERYTHROCYTE [DISTWIDTH] IN BLOOD BY AUTOMATED COUNT: 13.7 FL (ref 11.5–15)
GLUCOSE SERPL-MCNC: 85 MG/DL (ref 74–99)
GLUCOSE UR STRIP-MCNC: NEGATIVE MG/DL
HCG, URINE, POC: NEGATIVE
HCT VFR BLD AUTO: 33.4 % (ref 34–48)
HGB BLD-MCNC: 10.7 G/DL (ref 11.5–15.5)
HGB UR QL STRIP: NEGATIVE
IMM GRANULOCYTES # BLD: 0.04 E9/L
IMM GRANULOCYTES NFR BLD: 0.4 % (ref 0–5)
KETONES UR STRIP-MCNC: NEGATIVE MG/DL
LEUKOCYTE ESTERASE UR QL STRIP: NEGATIVE
LYMPHOCYTES # BLD: 2.97 E9/L (ref 1.5–4)
LYMPHOCYTES NFR BLD: 27.1 % (ref 20–42)
Lab: NORMAL
MCH RBC QN AUTO: 27.6 PG (ref 26–35)
MCHC RBC AUTO-ENTMCNC: 32 % (ref 32–34.5)
MCV RBC AUTO: 86.3 FL (ref 80–99.9)
MONOCYTES # BLD: 0.65 E9/L (ref 0.1–0.95)
MONOCYTES NFR BLD: 5.9 % (ref 2–12)
MUCOUS THREADS URNS QL MICRO: PRESENT /LPF
NEGATIVE QC PASS/FAIL: NORMAL
NEUTROPHILS # BLD: 7.16 E9/L (ref 1.8–7.3)
NEUTS SEG NFR BLD: 65.3 % (ref 43–80)
NITRITE UR QL STRIP: POSITIVE
PH UR STRIP: 5.5 [PH] (ref 5–9)
PLATELET # BLD AUTO: 385 E9/L (ref 130–450)
PMV BLD AUTO: 9.6 FL (ref 7–12)
POSITIVE QC PASS/FAIL: NORMAL
POTASSIUM SERPL-SCNC: 3.9 MMOL/L (ref 3.5–5)
PROT SERPL-MCNC: 7.1 G/DL (ref 6.4–8.3)
PROT UR STRIP-MCNC: NEGATIVE MG/DL
RBC # BLD AUTO: 3.87 E12/L (ref 3.5–5.5)
RBC #/AREA URNS HPF: ABNORMAL /HPF (ref 0–2)
SODIUM SERPL-SCNC: 141 MMOL/L (ref 132–146)
SP GR UR STRIP: >=1.03 (ref 1–1.03)
UROBILINOGEN UR STRIP-ACNC: 4 E.U./DL
WBC # BLD: 11 E9/L (ref 4.5–11.5)
WBC #/AREA URNS HPF: ABNORMAL /HPF (ref 0–5)

## 2023-05-31 PROCEDURE — 80053 COMPREHEN METABOLIC PANEL: CPT

## 2023-05-31 PROCEDURE — 96374 THER/PROPH/DIAG INJ IV PUSH: CPT

## 2023-05-31 PROCEDURE — 99284 EMERGENCY DEPT VISIT MOD MDM: CPT

## 2023-05-31 PROCEDURE — 87088 URINE BACTERIA CULTURE: CPT

## 2023-05-31 PROCEDURE — 81001 URINALYSIS AUTO W/SCOPE: CPT

## 2023-05-31 PROCEDURE — 2580000003 HC RX 258: Performed by: EMERGENCY MEDICINE

## 2023-05-31 PROCEDURE — 6360000002 HC RX W HCPCS: Performed by: EMERGENCY MEDICINE

## 2023-05-31 PROCEDURE — 85025 COMPLETE CBC W/AUTO DIFF WBC: CPT

## 2023-05-31 RX ORDER — ONDANSETRON 2 MG/ML
4 INJECTION INTRAMUSCULAR; INTRAVENOUS ONCE
Status: COMPLETED | OUTPATIENT
Start: 2023-05-31 | End: 2023-05-31

## 2023-05-31 RX ORDER — ONDANSETRON 4 MG/1
4 TABLET, FILM COATED ORAL EVERY 8 HOURS PRN
Qty: 12 TABLET | Refills: 0 | Status: SHIPPED | OUTPATIENT
Start: 2023-05-31 | End: 2023-06-05

## 2023-05-31 RX ORDER — 0.9 % SODIUM CHLORIDE 0.9 %
1000 INTRAVENOUS SOLUTION INTRAVENOUS ONCE
Status: COMPLETED | OUTPATIENT
Start: 2023-05-31 | End: 2023-05-31

## 2023-05-31 RX ADMIN — SODIUM CHLORIDE 1000 ML: 9 INJECTION, SOLUTION INTRAVENOUS at 19:26

## 2023-05-31 RX ADMIN — ONDANSETRON 4 MG: 2 INJECTION INTRAMUSCULAR; INTRAVENOUS at 19:27

## 2023-05-31 ASSESSMENT — ENCOUNTER SYMPTOMS
ABDOMINAL PAIN: 1
SHORTNESS OF BREATH: 0
NAUSEA: 1
CHEST TIGHTNESS: 0

## 2023-05-31 ASSESSMENT — PAIN SCALES - GENERAL: PAINLEVEL_OUTOF10: 9

## 2023-05-31 ASSESSMENT — PAIN DESCRIPTION - LOCATION: LOCATION: ABDOMEN

## 2023-05-31 ASSESSMENT — PAIN - FUNCTIONAL ASSESSMENT: PAIN_FUNCTIONAL_ASSESSMENT: 0-10

## 2023-05-31 NOTE — ED PROVIDER NOTES
23 yo Female presenting with sensation of dizziness for few days. She does not any lower quadrant abdominal pain as well. No fevers or chills, does have some nausea but no diarrhea. She says that when she gets up the dizzy sensation is worse, this happens intermittently. She also feels that her lips are somewhat pale and dry. No family history on file. Past Surgical History:   Procedure Laterality Date     SECTION N/A 2019     SECTION performed by Consuelo Wyatt MD at [facility] L&D    TONSILLECTOMY         Review of Systems   Constitutional:  Negative for chills and fever. Respiratory:  Negative for chest tightness and shortness of breath. Cardiovascular:  Negative for chest pain. Gastrointestinal:  Positive for abdominal pain and nausea. Neurological:  Positive for dizziness and light-headedness. Physical Exam  Constitutional:       General: She is not in acute distress. Appearance: She is well-developed. HENT:      Head: Normocephalic and atraumatic. Eyes:      Conjunctiva/sclera: Conjunctivae normal.      Pupils: Pupils are equal, round, and reactive to light. Neck:      Thyroid: No thyromegaly. Cardiovascular:      Rate and Rhythm: Normal rate and regular rhythm. Pulmonary:      Effort: Pulmonary effort is normal. No respiratory distress. Breath sounds: Normal breath sounds. Abdominal:      General: There is no distension. Palpations: Abdomen is soft. Tenderness: There is no abdominal tenderness. There is no guarding or rebound. Comments: No peritoneal signs or rigidity or guarding   Musculoskeletal:         General: No tenderness. Normal range of motion. Cervical back: Normal range of motion. Skin:     General: Skin is warm and dry. Findings: No erythema. Neurological:      Mental Status: She is alert and oriented to person, place, and time. Cranial Nerves: No cranial nerve deficit.       Coordination: Coordination

## 2023-06-03 LAB — BACTERIA UR CULT: NORMAL

## 2023-07-20 ENCOUNTER — APPOINTMENT (OUTPATIENT)
Dept: CT IMAGING | Age: 23
End: 2023-07-20
Payer: MEDICAID

## 2023-07-20 ENCOUNTER — HOSPITAL ENCOUNTER (EMERGENCY)
Age: 23
Discharge: HOME | End: 2023-07-20
Payer: MEDICAID

## 2023-07-20 VITALS
HEIGHT: 65 IN | HEART RATE: 93 BPM | TEMPERATURE: 97.3 F | SYSTOLIC BLOOD PRESSURE: 139 MMHG | DIASTOLIC BLOOD PRESSURE: 80 MMHG | RESPIRATION RATE: 18 BRPM | BODY MASS INDEX: 35.82 KG/M2 | OXYGEN SATURATION: 100 % | WEIGHT: 215 LBS

## 2023-07-20 DIAGNOSIS — R51.9 ACUTE NONINTRACTABLE HEADACHE, UNSPECIFIED HEADACHE TYPE: Primary | ICD-10-CM

## 2023-07-20 LAB
BACTERIA URNS QL MICRO: ABNORMAL
BILIRUB UR QL STRIP: NEGATIVE
CLARITY UR: CLEAR
COLOR UR: YELLOW
EPI CELLS #/AREA URNS HPF: ABNORMAL /HPF
GLUCOSE UR STRIP-MCNC: NEGATIVE MG/DL
HCG, URINE, POC: NEGATIVE
HGB UR QL STRIP.AUTO: ABNORMAL
KETONES UR STRIP-MCNC: NEGATIVE MG/DL
LEUKOCYTE ESTERASE UR QL STRIP: NEGATIVE
Lab: NORMAL
MUCOUS THREADS URNS QL MICRO: PRESENT
NEGATIVE QC PASS/FAIL: NORMAL
NITRITE UR QL STRIP: NEGATIVE
PH UR STRIP: 5 [PH] (ref 5–9)
POSITIVE QC PASS/FAIL: NORMAL
PROT UR STRIP-MCNC: NEGATIVE MG/DL
RBC #/AREA URNS HPF: ABNORMAL /HPF
SP GR UR STRIP: >1.03 (ref 1–1.03)
UROBILINOGEN UR STRIP-ACNC: 0.2 EU/DL (ref 0–1)
WBC #/AREA URNS HPF: ABNORMAL /HPF

## 2023-07-20 PROCEDURE — 96374 THER/PROPH/DIAG INJ IV PUSH: CPT

## 2023-07-20 PROCEDURE — 70450 CT HEAD/BRAIN W/O DYE: CPT

## 2023-07-20 PROCEDURE — 87086 URINE CULTURE/COLONY COUNT: CPT

## 2023-07-20 PROCEDURE — 96375 TX/PRO/DX INJ NEW DRUG ADDON: CPT

## 2023-07-20 PROCEDURE — 81001 URINALYSIS AUTO W/SCOPE: CPT

## 2023-07-20 PROCEDURE — 6360000002 HC RX W HCPCS: Performed by: PHYSICIAN ASSISTANT

## 2023-07-20 PROCEDURE — 2580000003 HC RX 258: Performed by: PHYSICIAN ASSISTANT

## 2023-07-20 PROCEDURE — 99284 EMERGENCY DEPT VISIT MOD MDM: CPT

## 2023-07-20 PROCEDURE — 96361 HYDRATE IV INFUSION ADD-ON: CPT

## 2023-07-20 RX ORDER — KETOROLAC TROMETHAMINE 30 MG/ML
30 INJECTION, SOLUTION INTRAMUSCULAR; INTRAVENOUS ONCE
Status: COMPLETED | OUTPATIENT
Start: 2023-07-20 | End: 2023-07-20

## 2023-07-20 RX ORDER — METOCLOPRAMIDE HYDROCHLORIDE 5 MG/ML
10 INJECTION INTRAMUSCULAR; INTRAVENOUS ONCE
Status: COMPLETED | OUTPATIENT
Start: 2023-07-20 | End: 2023-07-20

## 2023-07-20 RX ORDER — DIPHENHYDRAMINE HYDROCHLORIDE 50 MG/ML
25 INJECTION INTRAMUSCULAR; INTRAVENOUS ONCE
Status: COMPLETED | OUTPATIENT
Start: 2023-07-20 | End: 2023-07-20

## 2023-07-20 RX ORDER — 0.9 % SODIUM CHLORIDE 0.9 %
1000 INTRAVENOUS SOLUTION INTRAVENOUS ONCE
Status: COMPLETED | OUTPATIENT
Start: 2023-07-20 | End: 2023-07-20

## 2023-07-20 RX ADMIN — METOCLOPRAMIDE 10 MG: 5 INJECTION, SOLUTION INTRAMUSCULAR; INTRAVENOUS at 20:25

## 2023-07-20 RX ADMIN — KETOROLAC TROMETHAMINE 30 MG: 30 INJECTION, SOLUTION INTRAMUSCULAR; INTRAVENOUS at 20:27

## 2023-07-20 RX ADMIN — DIPHENHYDRAMINE HYDROCHLORIDE 25 MG: 50 INJECTION, SOLUTION INTRAMUSCULAR; INTRAVENOUS at 20:26

## 2023-07-20 RX ADMIN — SODIUM CHLORIDE 1000 ML: 9 INJECTION, SOLUTION INTRAVENOUS at 20:21

## 2023-07-20 ASSESSMENT — PAIN SCALES - GENERAL
PAINLEVEL_OUTOF10: 7
PAINLEVEL_OUTOF10: 9

## 2023-07-20 ASSESSMENT — LIFESTYLE VARIABLES
HOW OFTEN DO YOU HAVE A DRINK CONTAINING ALCOHOL: NEVER
HOW MANY STANDARD DRINKS CONTAINING ALCOHOL DO YOU HAVE ON A TYPICAL DAY: PATIENT DOES NOT DRINK

## 2023-07-20 ASSESSMENT — PAIN DESCRIPTION - DESCRIPTORS
DESCRIPTORS: ACHING
DESCRIPTORS: ACHING;THROBBING

## 2023-07-20 ASSESSMENT — PAIN DESCRIPTION - LOCATION: LOCATION: HEAD

## 2023-07-20 ASSESSMENT — PAIN DESCRIPTION - FREQUENCY: FREQUENCY: CONTINUOUS

## 2023-07-20 ASSESSMENT — PAIN DESCRIPTION - PAIN TYPE: TYPE: ACUTE PAIN

## 2023-07-20 ASSESSMENT — PAIN - FUNCTIONAL ASSESSMENT: PAIN_FUNCTIONAL_ASSESSMENT: 0-10

## 2023-07-22 LAB
MICROORGANISM SPEC CULT: ABNORMAL
SPECIMEN DESCRIPTION: ABNORMAL

## 2023-09-07 ENCOUNTER — HOSPITAL ENCOUNTER (EMERGENCY)
Age: 23
Discharge: HOME | End: 2023-09-07
Payer: MEDICAID

## 2023-09-07 VITALS
DIASTOLIC BLOOD PRESSURE: 71 MMHG | HEART RATE: 96 BPM | OXYGEN SATURATION: 99 % | SYSTOLIC BLOOD PRESSURE: 119 MMHG | TEMPERATURE: 98 F | RESPIRATION RATE: 18 BRPM

## 2023-09-07 DIAGNOSIS — G56.01 CARPAL TUNNEL SYNDROME OF RIGHT WRIST: ICD-10-CM

## 2023-09-07 DIAGNOSIS — M25.531 RIGHT WRIST PAIN: Primary | ICD-10-CM

## 2023-09-07 PROCEDURE — 6370000000 HC RX 637 (ALT 250 FOR IP): Performed by: PHYSICIAN ASSISTANT

## 2023-09-07 PROCEDURE — 99283 EMERGENCY DEPT VISIT LOW MDM: CPT

## 2023-09-07 RX ORDER — IBUPROFEN 800 MG/1
800 TABLET ORAL ONCE
Status: COMPLETED | OUTPATIENT
Start: 2023-09-07 | End: 2023-09-07

## 2023-09-07 RX ORDER — PREDNISONE 10 MG/1
40 TABLET ORAL DAILY
Qty: 20 TABLET | Refills: 0 | Status: SHIPPED | OUTPATIENT
Start: 2023-09-07 | End: 2023-09-12

## 2023-09-07 RX ORDER — NAPROXEN 500 MG/1
500 TABLET ORAL 2 TIMES DAILY
Qty: 14 TABLET | Refills: 0 | Status: SHIPPED | OUTPATIENT
Start: 2023-09-07 | End: 2023-09-14

## 2023-09-07 RX ORDER — PREDNISONE 20 MG/1
60 TABLET ORAL ONCE
Status: COMPLETED | OUTPATIENT
Start: 2023-09-07 | End: 2023-09-07

## 2023-09-07 RX ADMIN — PREDNISONE 60 MG: 20 TABLET ORAL at 08:24

## 2023-09-07 RX ADMIN — IBUPROFEN 800 MG: 800 TABLET, FILM COATED ORAL at 08:24

## 2023-09-07 ASSESSMENT — PAIN - FUNCTIONAL ASSESSMENT: PAIN_FUNCTIONAL_ASSESSMENT: NONE - DENIES PAIN

## 2023-09-07 NOTE — ED NOTES
Discharge instructions given. No further questions at this time.       Larene Gitelman, RN  09/07/23 4027

## 2024-04-28 ENCOUNTER — HOSPITAL ENCOUNTER (EMERGENCY)
Age: 24
Discharge: HOME | End: 2024-04-28
Attending: STUDENT IN AN ORGANIZED HEALTH CARE EDUCATION/TRAINING PROGRAM
Payer: MEDICAID

## 2024-04-28 ENCOUNTER — APPOINTMENT (OUTPATIENT)
Dept: ULTRASOUND IMAGING | Age: 24
End: 2024-04-28
Payer: MEDICAID

## 2024-04-28 VITALS
DIASTOLIC BLOOD PRESSURE: 66 MMHG | TEMPERATURE: 98 F | HEART RATE: 69 BPM | OXYGEN SATURATION: 99 % | BODY MASS INDEX: 28.29 KG/M2 | RESPIRATION RATE: 18 BRPM | WEIGHT: 170 LBS | SYSTOLIC BLOOD PRESSURE: 126 MMHG

## 2024-04-28 DIAGNOSIS — N93.9 ABNORMAL UTERINE BLEEDING: Primary | ICD-10-CM

## 2024-04-28 LAB
ALBUMIN SERPL-MCNC: 4 G/DL (ref 3.5–5.2)
ALP SERPL-CCNC: 58 U/L (ref 35–104)
ALT SERPL-CCNC: 10 U/L (ref 0–32)
ANION GAP SERPL CALCULATED.3IONS-SCNC: 11 MMOL/L (ref 7–16)
AST SERPL-CCNC: 14 U/L (ref 0–31)
BASOPHILS # BLD: 0.06 K/UL (ref 0–0.2)
BASOPHILS NFR BLD: 1 % (ref 0–2)
BILIRUB SERPL-MCNC: <0.2 MG/DL (ref 0–1.2)
BILIRUB UR QL STRIP: NEGATIVE
BUN SERPL-MCNC: 15 MG/DL (ref 6–20)
CALCIUM SERPL-MCNC: 9 MG/DL (ref 8.6–10.2)
CHLORIDE SERPL-SCNC: 105 MMOL/L (ref 98–107)
CLARITY UR: CLEAR
CO2 SERPL-SCNC: 24 MMOL/L (ref 22–29)
COLOR UR: YELLOW
CREAT SERPL-MCNC: 0.7 MG/DL (ref 0.5–1)
EOSINOPHIL # BLD: 0.24 K/UL (ref 0.05–0.5)
EOSINOPHILS RELATIVE PERCENT: 2 % (ref 0–6)
EPI CELLS #/AREA URNS HPF: ABNORMAL /HPF
ERYTHROCYTE [DISTWIDTH] IN BLOOD BY AUTOMATED COUNT: 14.3 % (ref 11.5–15)
GFR SERPL CREATININE-BSD FRML MDRD: >90 ML/MIN/1.73M2
GLUCOSE SERPL-MCNC: 90 MG/DL (ref 74–99)
GLUCOSE UR STRIP-MCNC: NEGATIVE MG/DL
HCG, URINE, POC: NEGATIVE
HCT VFR BLD AUTO: 29.8 % (ref 34–48)
HGB BLD-MCNC: 9.8 G/DL (ref 11.5–15.5)
HGB UR QL STRIP.AUTO: ABNORMAL
IMM GRANULOCYTES # BLD AUTO: 0.03 K/UL (ref 0–0.58)
IMM GRANULOCYTES NFR BLD: 0 % (ref 0–5)
KETONES UR STRIP-MCNC: NEGATIVE MG/DL
LEUKOCYTE ESTERASE UR QL STRIP: NEGATIVE
LYMPHOCYTES NFR BLD: 3.48 K/UL (ref 1.5–4)
LYMPHOCYTES RELATIVE PERCENT: 30 % (ref 20–42)
Lab: NORMAL
MCH RBC QN AUTO: 27.9 PG (ref 26–35)
MCHC RBC AUTO-ENTMCNC: 32.9 G/DL (ref 32–34.5)
MCV RBC AUTO: 84.9 FL (ref 80–99.9)
MONOCYTES NFR BLD: 0.69 K/UL (ref 0.1–0.95)
MONOCYTES NFR BLD: 6 % (ref 2–12)
NEGATIVE QC PASS/FAIL: NORMAL
NEUTROPHILS NFR BLD: 61 % (ref 43–80)
NEUTS SEG NFR BLD: 7.1 K/UL (ref 1.8–7.3)
NITRITE UR QL STRIP: NEGATIVE
PH UR STRIP: 5.5 [PH] (ref 5–9)
PLATELET # BLD AUTO: 352 K/UL (ref 130–450)
PMV BLD AUTO: 9.8 FL (ref 7–12)
POSITIVE QC PASS/FAIL: NORMAL
POTASSIUM SERPL-SCNC: 4 MMOL/L (ref 3.5–5)
PROT SERPL-MCNC: 7.1 G/DL (ref 6.4–8.3)
PROT UR STRIP-MCNC: NEGATIVE MG/DL
RBC # BLD AUTO: 3.51 M/UL (ref 3.5–5.5)
RBC #/AREA URNS HPF: ABNORMAL /HPF
SODIUM SERPL-SCNC: 140 MMOL/L (ref 132–146)
SP GR UR STRIP: >1.03 (ref 1–1.03)
UROBILINOGEN UR STRIP-ACNC: 0.2 EU/DL (ref 0–1)
WBC #/AREA URNS HPF: ABNORMAL /HPF
WBC OTHER # BLD: 11.6 K/UL (ref 4.5–11.5)

## 2024-04-28 PROCEDURE — 81001 URINALYSIS AUTO W/SCOPE: CPT

## 2024-04-28 PROCEDURE — 85025 COMPLETE CBC W/AUTO DIFF WBC: CPT

## 2024-04-28 PROCEDURE — 99284 EMERGENCY DEPT VISIT MOD MDM: CPT

## 2024-04-28 PROCEDURE — 76830 TRANSVAGINAL US NON-OB: CPT

## 2024-04-28 PROCEDURE — 80053 COMPREHEN METABOLIC PANEL: CPT

## 2024-04-29 NOTE — DISCHARGE INSTRUCTIONS
Return to the emergency room if you are having more than 2 fully soaked pads going on since every 2 hours.  Follow-up with your obstetrician.

## 2024-04-29 NOTE — ED PROVIDER NOTES
[facility] EMERGENCY DEPARTMENT  EMERGENCY DEPARTMENT ENCOUNTER        Pt Name: Kenyon Pool  MRN: [id number]  Birthdate 2000  Date of evaluation: 2024  Provider: Jet Kinney MD  PCP: No primary care provider on file.  Note Started: 8:50 PM EDT 24    CHIEF COMPLAINT       Chief Complaint   Patient presents with    Vaginal Bleeding     Pt complaint of vaginal bleeding x1 week. States it has gotten worse over last week.        HISTORY OF PRESENT ILLNESS: 1 or more Elements   History From: Patient    Limitations to history : None  Social Determinants : None    Kenyon Pool is a 24 y.o. female who presents with complaints of abnormal vaginal bleeding.  She mentions that she is between her midcycle currently and has been having on and off vaginal bleeding since the past week.  She mentions that sometimes she has clots more than usual and then sometimes not at all.  She mentioned that yesterday she did pass big clots however today it is decreasing.  She mentioned that she has changed her pads about 10 times today but they were not fully soaked and just had stains of blood present.    Her last menstrual period was on 2024.    Denies any fever, chills, nausea, vomiting, headache, dizziness, vision changes, neck tenderness or stiffness, weakness, chest pain, palpitations, leg swelling/tenderness, sob, cough, abdominal pain, dysuria, hematuria, diarrhea, constipation, bloody stools.    Nursing Notes were all reviewed and agreed with or any disagreements were addressed in the HPI.    ROS:   Pertinent positives and negatives are stated within HPI, all other systems reviewed and are negative.      --------------------------------------------- PAST HISTORY ---------------------------------------------  Past Medical History:       Diagnosis Date    Asthma        Past Surgical History:       Procedure Laterality Date     SECTION N/A 2019

## 2024-05-15 ENCOUNTER — APPOINTMENT (OUTPATIENT)
Dept: ULTRASOUND IMAGING | Age: 24
End: 2024-05-15
Payer: MEDICAID

## 2024-05-15 ENCOUNTER — HOSPITAL ENCOUNTER (EMERGENCY)
Age: 24
Discharge: HOME | End: 2024-05-15
Attending: EMERGENCY MEDICINE
Payer: MEDICAID

## 2024-05-15 VITALS
SYSTOLIC BLOOD PRESSURE: 100 MMHG | RESPIRATION RATE: 20 BRPM | OXYGEN SATURATION: 100 % | DIASTOLIC BLOOD PRESSURE: 60 MMHG | BODY MASS INDEX: 29.16 KG/M2 | TEMPERATURE: 98.2 F | HEART RATE: 99 BPM | HEIGHT: 65 IN | WEIGHT: 175 LBS

## 2024-05-15 DIAGNOSIS — R20.2 HAND TINGLING: Primary | ICD-10-CM

## 2024-05-15 LAB
HCG, URINE, POC: NEGATIVE
Lab: NORMAL
NEGATIVE QC PASS/FAIL: NORMAL
POSITIVE QC PASS/FAIL: NORMAL

## 2024-05-15 PROCEDURE — 96372 THER/PROPH/DIAG INJ SC/IM: CPT

## 2024-05-15 PROCEDURE — 93971 EXTREMITY STUDY: CPT

## 2024-05-15 PROCEDURE — 6360000002 HC RX W HCPCS: Performed by: EMERGENCY MEDICINE

## 2024-05-15 PROCEDURE — 99284 EMERGENCY DEPT VISIT MOD MDM: CPT

## 2024-05-15 RX ORDER — KETOROLAC TROMETHAMINE 30 MG/ML
60 INJECTION, SOLUTION INTRAMUSCULAR; INTRAVENOUS ONCE
Status: COMPLETED | OUTPATIENT
Start: 2024-05-15 | End: 2024-05-15

## 2024-05-15 RX ORDER — PREDNISONE 10 MG/1
TABLET ORAL
Qty: 20 TABLET | Refills: 0 | Status: SHIPPED | OUTPATIENT
Start: 2024-05-15 | End: 2024-05-25

## 2024-05-15 RX ADMIN — KETOROLAC TROMETHAMINE 60 MG: 60 INJECTION, SOLUTION INTRAMUSCULAR at 08:38

## 2024-05-15 ASSESSMENT — ENCOUNTER SYMPTOMS
ABDOMINAL PAIN: 0
CHEST TIGHTNESS: 0
SHORTNESS OF BREATH: 0

## 2024-05-15 ASSESSMENT — LIFESTYLE VARIABLES
HOW MANY STANDARD DRINKS CONTAINING ALCOHOL DO YOU HAVE ON A TYPICAL DAY: PATIENT DOES NOT DRINK
HOW OFTEN DO YOU HAVE A DRINK CONTAINING ALCOHOL: NEVER

## 2024-05-15 ASSESSMENT — PAIN SCALES - GENERAL: PAINLEVEL_OUTOF10: 6

## 2024-05-15 NOTE — ED PROVIDER NOTES
Findings: No erythema.   Neurological:      Mental Status: She is alert and oriented to person, place, and time.      Cranial Nerves: No cranial nerve deficit.      Coordination: Coordination normal.          Procedures     MDM       History From: Patient    CC/HPI Summary, DDx, ED Course, Reassessment, Tests Considered, Patient expectation:   See HPI    Social Determinants affecting Dx or Tx: Stress, language difficulties    Chronic Conditions: History of complicated pregnancies    Records Reviewed: Neurology visit from 10/19/2021 for morbid obesity.  She also had a gastric surgery relating to this at [facility], general surgery note reviewed as well.               --------------------------------------------- PAST HISTORY ---------------------------------------------  Past Medical History:  has a past medical history of Asthma.    Past Surgical History:  has a past surgical history that includes Tonsillectomy and  section (N/A, 2019).    Social History:  reports that she has never smoked. She has never used smokeless tobacco. She reports current drug use. Drug: Marijuana (Weed). She reports that she does not drink alcohol.    Family History: family history is not on file.     The patient’s home medications have been reviewed.    Allergies: Norco [hydrocodone-acetaminophen] and Penicillins    -------------------------------------------------- RESULTS -------------------------------------------------  Labs:  Results for orders placed or performed during the hospital encounter of 05/15/24   POC Pregnancy Urine Qual   Result Value Ref Range    HCG, Urine, POC Negative Negative    Lot Number [id number]     Positive QC Pass/Fail Pass     Negative QC Pass/Fail Pass        Radiology:  Vascular duplex upper extremity venous right   Final Result   No evidence of DVT.             ------------------------- NURSING NOTES AND VITALS REVIEWED ---------------------------  Date / Time Roomed:  5/15/2024  8:14 AM  ED

## 2024-05-28 ENCOUNTER — TELEPHONE (OUTPATIENT)
Dept: PRIMARY CARE CLINIC | Age: 24
End: 2024-05-28

## 2024-10-24 ENCOUNTER — HOSPITAL ENCOUNTER (EMERGENCY)
Age: 24
Discharge: LEFT BEFORE DISCHARGE | End: 2024-10-24

## 2024-10-24 VITALS — OXYGEN SATURATION: 99 % | TEMPERATURE: 98.5 F | HEART RATE: 86 BPM

## 2024-12-22 ENCOUNTER — HOSPITAL ENCOUNTER (EMERGENCY)
Age: 24
Discharge: HOME | End: 2024-12-22
Attending: EMERGENCY MEDICINE
Payer: MEDICAID

## 2024-12-22 VITALS
BODY MASS INDEX: 28.96 KG/M2 | RESPIRATION RATE: 20 BRPM | TEMPERATURE: 98 F | DIASTOLIC BLOOD PRESSURE: 118 MMHG | HEART RATE: 104 BPM | SYSTOLIC BLOOD PRESSURE: 135 MMHG | OXYGEN SATURATION: 99 % | WEIGHT: 174 LBS

## 2024-12-22 DIAGNOSIS — R19.7 NAUSEA VOMITING AND DIARRHEA: Primary | ICD-10-CM

## 2024-12-22 DIAGNOSIS — R11.2 NAUSEA VOMITING AND DIARRHEA: Primary | ICD-10-CM

## 2024-12-22 LAB
HCG, URINE, POC: NEGATIVE
Lab: NORMAL
NEGATIVE QC PASS/FAIL: NORMAL
POSITIVE QC PASS/FAIL: NORMAL

## 2024-12-22 PROCEDURE — 6370000000 HC RX 637 (ALT 250 FOR IP): Performed by: EMERGENCY MEDICINE

## 2024-12-22 PROCEDURE — 99283 EMERGENCY DEPT VISIT LOW MDM: CPT

## 2024-12-22 RX ORDER — DICYCLOMINE HYDROCHLORIDE 10 MG/1
10 CAPSULE ORAL
Qty: 30 CAPSULE | Refills: 0 | Status: SHIPPED | OUTPATIENT
Start: 2024-12-22

## 2024-12-22 RX ORDER — ONDANSETRON 4 MG/1
4 TABLET, ORALLY DISINTEGRATING ORAL ONCE
Status: COMPLETED | OUTPATIENT
Start: 2024-12-22 | End: 2024-12-22

## 2024-12-22 RX ORDER — DICYCLOMINE HYDROCHLORIDE 10 MG/1
10 CAPSULE ORAL ONCE
Status: COMPLETED | OUTPATIENT
Start: 2024-12-22 | End: 2024-12-22

## 2024-12-22 RX ORDER — ONDANSETRON 4 MG/1
4 TABLET, FILM COATED ORAL EVERY 8 HOURS PRN
Qty: 20 TABLET | Refills: 0 | Status: SHIPPED | OUTPATIENT
Start: 2024-12-22

## 2024-12-22 RX ADMIN — ONDANSETRON 4 MG: 4 TABLET, ORALLY DISINTEGRATING ORAL at 15:42

## 2024-12-22 RX ADMIN — DICYCLOMINE HYDROCHLORIDE 10 MG: 10 CAPSULE ORAL at 15:43

## 2024-12-22 NOTE — ED PROVIDER NOTES
Patient presents emergency department complaining of nausea vomiting and diarrhea that started last evening.  She states that when she tries to drink anything she ends up vomiting.  She denies any significant past medical history and states that she is not having any fevers or chills.  She denies abdominal discomfort except when she is about to vomit or move her bowels.  She states there is been no dysuria, no vaginal bleeding and no vaginal discharge.    The history is provided by the patient.       Review of Systems   Constitutional:  Positive for activity change and appetite change. Negative for chills and fever.   HENT: Negative.     Respiratory:  Negative for shortness of breath.    Cardiovascular:  Negative for chest pain.   Gastrointestinal:  Positive for diarrhea, nausea and vomiting. Negative for abdominal pain.   Genitourinary:  Negative for dysuria, flank pain, vaginal bleeding and vaginal discharge.   Neurological:  Negative for light-headedness.       Physical Exam  Vitals and nursing note reviewed.   Constitutional:       General: She is not in acute distress.     Appearance: Normal appearance. She is well-developed and normal weight. She is not ill-appearing or toxic-appearing.   HENT:      Head: Normocephalic and atraumatic.      Nose: Nose normal.      Mouth/Throat:      Mouth: Mucous membranes are moist.      Pharynx: Oropharynx is clear.   Eyes:      Extraocular Movements: Extraocular movements intact.      Conjunctiva/sclera: Conjunctivae normal.      Pupils: Pupils are equal, round, and reactive to light.   Cardiovascular:      Rate and Rhythm: Normal rate and regular rhythm.      Pulses: Normal pulses.      Heart sounds: Normal heart sounds. No murmur heard.  Pulmonary:      Effort: Pulmonary effort is normal. No respiratory distress.      Breath sounds: Normal breath sounds. No wheezing or rales.   Abdominal:      General: Bowel sounds are normal.      Palpations: Abdomen is soft.

## 2024-12-22 NOTE — ED NOTES
Per physician, patient can be discharged if she can tolerate eating and drinking.  Patient given saltine crackers and an orange juice at this time.

## 2025-01-29 ENCOUNTER — APPOINTMENT (OUTPATIENT)
Dept: GENERAL RADIOLOGY | Age: 25
End: 2025-01-29
Payer: MEDICAID

## 2025-01-29 ENCOUNTER — HOSPITAL ENCOUNTER (EMERGENCY)
Age: 25
Discharge: HOME | End: 2025-01-29
Attending: STUDENT IN AN ORGANIZED HEALTH CARE EDUCATION/TRAINING PROGRAM
Payer: MEDICAID

## 2025-01-29 VITALS
OXYGEN SATURATION: 96 % | DIASTOLIC BLOOD PRESSURE: 68 MMHG | WEIGHT: 174 LBS | HEART RATE: 88 BPM | TEMPERATURE: 98 F | SYSTOLIC BLOOD PRESSURE: 104 MMHG | HEIGHT: 65 IN | BODY MASS INDEX: 28.99 KG/M2 | RESPIRATION RATE: 20 BRPM

## 2025-01-29 DIAGNOSIS — J45.901 EXACERBATION OF ASTHMA, UNSPECIFIED ASTHMA SEVERITY, UNSPECIFIED WHETHER PERSISTENT: ICD-10-CM

## 2025-01-29 DIAGNOSIS — J06.9 ACUTE UPPER RESPIRATORY INFECTION: Primary | ICD-10-CM

## 2025-01-29 LAB
FLUAV RNA RESP QL NAA+PROBE: DETECTED
FLUBV RNA RESP QL NAA+PROBE: NOT DETECTED
SARS-COV-2 RNA RESP QL NAA+PROBE: NOT DETECTED
SOURCE: ABNORMAL
SPECIMEN DESCRIPTION: ABNORMAL

## 2025-01-29 PROCEDURE — 87636 SARSCOV2 & INF A&B AMP PRB: CPT

## 2025-01-29 PROCEDURE — 6370000000 HC RX 637 (ALT 250 FOR IP): Performed by: STUDENT IN AN ORGANIZED HEALTH CARE EDUCATION/TRAINING PROGRAM

## 2025-01-29 PROCEDURE — 94640 AIRWAY INHALATION TREATMENT: CPT

## 2025-01-29 PROCEDURE — 99284 EMERGENCY DEPT VISIT MOD MDM: CPT

## 2025-01-29 PROCEDURE — 71046 X-RAY EXAM CHEST 2 VIEWS: CPT

## 2025-01-29 RX ORDER — ALBUTEROL SULFATE 90 UG/1
2 INHALANT RESPIRATORY (INHALATION) 4 TIMES DAILY PRN
Qty: 18 G | Refills: 0 | Status: SHIPPED | OUTPATIENT
Start: 2025-01-29

## 2025-01-29 RX ORDER — PREDNISONE 50 MG/1
50 TABLET ORAL DAILY
Qty: 5 TABLET | Refills: 0 | Status: SHIPPED | OUTPATIENT
Start: 2025-01-29 | End: 2025-02-03

## 2025-01-29 RX ORDER — OSELTAMIVIR PHOSPHATE 75 MG/1
75 CAPSULE ORAL 2 TIMES DAILY
Qty: 10 CAPSULE | Refills: 0 | Status: SHIPPED | OUTPATIENT
Start: 2025-01-29 | End: 2025-02-03

## 2025-01-29 RX ORDER — IPRATROPIUM BROMIDE AND ALBUTEROL SULFATE 2.5; .5 MG/3ML; MG/3ML
1 SOLUTION RESPIRATORY (INHALATION) ONCE
Status: COMPLETED | OUTPATIENT
Start: 2025-01-29 | End: 2025-01-29

## 2025-01-29 RX ORDER — ONDANSETRON 4 MG/1
4 TABLET, ORALLY DISINTEGRATING ORAL 3 TIMES DAILY PRN
Qty: 21 TABLET | Refills: 0 | Status: SHIPPED | OUTPATIENT
Start: 2025-01-29

## 2025-01-29 RX ADMIN — IPRATROPIUM BROMIDE AND ALBUTEROL SULFATE 1 DOSE: .5; 3 SOLUTION RESPIRATORY (INHALATION) at 08:03

## 2025-01-29 NOTE — DISCHARGE INSTRUCTIONS
You were seen in the emergency room today for shortness of breath cough and fever.  I suspect you likely have a viral infection, this will take time to clear on its own and can take up to 2 weeks to fully resolve.  I wrote you for some steroids to go home with I recommend using them for the next week, along with your albuterol inhaler every 4 hours while you are awake for the next week.  If you start experiencing issues with worsening shortness of breath, fevers, chills, dehydration, please return to the emergency department.

## 2025-01-29 NOTE — DISCHARGE INSTR - COC
Continuity of Care Form    Patient Name: Kenyon Pool   :  2000  MRN:  [id number]    Admit date:  2025  Discharge date:  ***    Code Status Order: Prior   Advance Directives:   Advance Care Flowsheet Documentation             Admitting Physician:  No admitting provider for patient encounter.  PCP: No primary care provider on file.    Discharging Nurse: ***  Discharging Hospital Unit/Room#:   Discharging Unit Phone Number: ***    Emergency Contact:   Extended Emergency Contact Information  Primary Emergency Contact: JALEESA POOL  Home Phone: [phone]  Mobile Phone: [phone]  Relation: Parent   needed? No    Past Surgical History:  Past Surgical History:   Procedure Laterality Date     SECTION N/A 2019     SECTION [facility]ok Cook [facility]         Immunization History:   Immunization History   Administered Date(s) Administered    COVID-19, PFIZER GRAY top, DO NOT Dilute, (age 12 y+), IM, 30 mcg/0.3 mL 2022    Influenza, FLUCELVAX, (age 6 mo+), MDCK, Quadv PF, 0.5mL 10/03/2018    TDaP, ADACEL (age 10y-64y), BOOSTRIX (age 10y+), IM, 0.5mL 10/30/2018       Active Problems:  Patient Active Problem List   Diagnosis Code    Chlamydia infection affecting pregnancy, antepartum, first trimester O98.811, A74.9    Non-English speaking patient Z78.9    Suspected problem with fetal growth not found Z03.74    Right upper quadrant abdominal pain R10.11    Abdominal pain affecting pregnancy O26.899, R10.9    UTI (urinary tract infection) during pregnancy, third trimester O23.43    Obesity affecting pregnancy in third trimester O99.213    Variable fetal heart rate decelerations, antepartum O36.8390    Non-reactive NST (non-stress test) O28.8    Oligohydramnios in third trimester O41.03X0    39 weeks gestation of pregnancy Z3A.39       Isolation/Infection:   Isolation            No Isolation          Patient Infection Status

## 2025-01-29 NOTE — ED PROVIDER NOTES
[facility] EMERGENCY DEPARTMENT  EMERGENCY DEPARTMENT ENCOUNTER        Pt Name: Kenyon Pool  MRN: [id number]  Birthdate 2000  Date of evaluation: 2025  Provider: Gama Bennett MD  PCP: No primary care provider on file.  Note Started: 7:46 AM EST 25    CHIEF COMPLAINT       Chief Complaint   Patient presents with    Cough    Fever     Body aches x2 days       HISTORY OF PRESENT ILLNESS: 1 or more Elements        Limitations to history : None    Kenyon Pool is a 25 y.o. female who presents to the emergency department for cough, fever, congestion.  No nausea, no vomiting, diarrhea.  Is experiencing bodyaches.  Denies any urinary symptoms.  Denies any abdominal pain.  Her son is currently sick with upper respiratory infection symptoms.  She does have a history of asthma and says she feels a little short of breath.    Nursing Notes were all reviewed and agreed with or any disagreements were addressed in the HPI.      REVIEW OF EXTERNAL NOTE :       As below    REVIEW OF SYSTEMS :      Positives and Pertinent negatives as per HPI.     SURGICAL HISTORY     Past Surgical History:   Procedure Laterality Date     SECTION N/A 2019     SECTION performed by Ashok Cook MD at [facility] L&D    TONSILLECTOMY         CURRENTMEDICATIONS       Previous Medications    DICYCLOMINE (BENTYL) 10 MG CAPSULE    Take 1 capsule by mouth 4 times daily (before meals and nightly)       ALLERGIES     Norco [hydrocodone-acetaminophen] and Penicillins    FAMILYHISTORY     History reviewed. No pertinent family history.     SOCIAL HISTORY       Social History     Tobacco Use    Smoking status: Never    Smokeless tobacco: Never   Vaping Use    Vaping status: Never Used   Substance Use Topics    Alcohol use: No    Drug use: Not Currently     Types: Marijuana (Weed)       SCREENINGS        Hickman Coma Scale  Eye Opening: Spontaneous  Best Verbal Response:

## 2025-03-25 ENCOUNTER — CLINICAL DOCUMENTATION (OUTPATIENT)
Dept: INFUSION THERAPY | Age: 25
End: 2025-03-25

## 2025-03-25 ENCOUNTER — HOSPITAL ENCOUNTER (OUTPATIENT)
Dept: INFUSION THERAPY | Age: 25
Discharge: HOME | End: 2025-03-25
Payer: MEDICAID

## 2025-03-25 ENCOUNTER — OFFICE VISIT (OUTPATIENT)
Dept: ONCOLOGY | Age: 25
End: 2025-03-25
Payer: MEDICAID

## 2025-03-25 VITALS
SYSTOLIC BLOOD PRESSURE: 95 MMHG | HEART RATE: 62 BPM | WEIGHT: 176.2 LBS | DIASTOLIC BLOOD PRESSURE: 61 MMHG | BODY MASS INDEX: 29.36 KG/M2 | OXYGEN SATURATION: 100 % | HEIGHT: 65 IN | TEMPERATURE: 98.1 F

## 2025-03-25 DIAGNOSIS — D50.0 IRON DEFICIENCY ANEMIA DUE TO CHRONIC BLOOD LOSS: ICD-10-CM

## 2025-03-25 DIAGNOSIS — D50.0 IRON DEFICIENCY ANEMIA DUE TO CHRONIC BLOOD LOSS: Primary | ICD-10-CM

## 2025-03-25 LAB
ALBUMIN SERPL-MCNC: 4.5 G/DL (ref 3.5–5.2)
ALP SERPL-CCNC: 53 U/L (ref 35–104)
ALT SERPL-CCNC: 9 U/L (ref 0–32)
ANION GAP SERPL CALCULATED.3IONS-SCNC: 12 MMOL/L (ref 7–16)
AST SERPL-CCNC: 15 U/L (ref 0–31)
BASOPHILS # BLD: 0.04 K/UL (ref 0–0.2)
BASOPHILS NFR BLD: 1 % (ref 0–2)
BILIRUB SERPL-MCNC: 0.2 MG/DL (ref 0–1.2)
BUN SERPL-MCNC: 10 MG/DL (ref 6–20)
CALCIUM SERPL-MCNC: 9 MG/DL (ref 8.6–10.2)
CHLORIDE SERPL-SCNC: 103 MMOL/L (ref 98–107)
CO2 SERPL-SCNC: 23 MMOL/L (ref 22–29)
CREAT SERPL-MCNC: 0.7 MG/DL (ref 0.5–1)
EOSINOPHIL # BLD: 0.14 K/UL (ref 0.05–0.5)
EOSINOPHILS RELATIVE PERCENT: 2 % (ref 0–6)
ERYTHROCYTE [DISTWIDTH] IN BLOOD BY AUTOMATED COUNT: 16 % (ref 11.5–15)
FERRITIN SERPL-MCNC: 10 NG/ML
GFR, ESTIMATED: >90 ML/MIN/1.73M2
GLUCOSE SERPL-MCNC: 90 MG/DL (ref 74–99)
HCT VFR BLD AUTO: 33.7 % (ref 34–48)
HGB BLD-MCNC: 10.5 G/DL (ref 11.5–15.5)
IMM GRANULOCYTES # BLD AUTO: <0.03 K/UL (ref 0–0.58)
IMM GRANULOCYTES NFR BLD: 0 % (ref 0–5)
IRON SATN MFR SERPL: 25 % (ref 15–50)
IRON SERPL-MCNC: 95 UG/DL (ref 37–145)
LYMPHOCYTES NFR BLD: 1.78 K/UL (ref 1.5–4)
LYMPHOCYTES RELATIVE PERCENT: 23 % (ref 20–42)
MCH RBC QN AUTO: 27.6 PG (ref 26–35)
MCHC RBC AUTO-ENTMCNC: 31.2 G/DL (ref 32–34.5)
MCV RBC AUTO: 88.7 FL (ref 80–99.9)
MONOCYTES NFR BLD: 0.49 K/UL (ref 0.1–0.95)
MONOCYTES NFR BLD: 6 % (ref 2–12)
NEUTROPHILS NFR BLD: 69 % (ref 43–80)
NEUTS SEG NFR BLD: 5.43 K/UL (ref 1.8–7.3)
PLATELET # BLD AUTO: 350 K/UL (ref 130–450)
PMV BLD AUTO: 9.7 FL (ref 7–12)
POTASSIUM SERPL-SCNC: 3.7 MMOL/L (ref 3.5–5)
PROT SERPL-MCNC: 7.3 G/DL (ref 6.4–8.3)
RBC # BLD AUTO: 3.8 M/UL (ref 3.5–5.5)
SODIUM SERPL-SCNC: 138 MMOL/L (ref 132–146)
TIBC SERPL-MCNC: 377 UG/DL (ref 250–450)
WBC OTHER # BLD: 7.9 K/UL (ref 4.5–11.5)

## 2025-03-25 PROCEDURE — 83540 ASSAY OF IRON: CPT

## 2025-03-25 PROCEDURE — 36415 COLL VENOUS BLD VENIPUNCTURE: CPT

## 2025-03-25 PROCEDURE — 83550 IRON BINDING TEST: CPT

## 2025-03-25 PROCEDURE — 99214 OFFICE O/P EST MOD 30 MIN: CPT

## 2025-03-25 PROCEDURE — 82728 ASSAY OF FERRITIN: CPT

## 2025-03-25 PROCEDURE — 80053 COMPREHEN METABOLIC PANEL: CPT

## 2025-03-25 PROCEDURE — 85025 COMPLETE CBC W/AUTO DIFF WBC: CPT

## 2025-03-25 RX ORDER — SODIUM CHLORIDE 9 MG/ML
5-250 INJECTION, SOLUTION INTRAVENOUS PRN
OUTPATIENT
Start: 2025-04-01

## 2025-03-25 RX ORDER — SODIUM CHLORIDE 0.9 % (FLUSH) 0.9 %
5-40 SYRINGE (ML) INJECTION PRN
OUTPATIENT
Start: 2025-04-01

## 2025-03-25 RX ORDER — ONDANSETRON 2 MG/ML
8 INJECTION INTRAMUSCULAR; INTRAVENOUS
OUTPATIENT
Start: 2025-04-01

## 2025-03-25 RX ORDER — SODIUM CHLORIDE 9 MG/ML
INJECTION, SOLUTION INTRAVENOUS CONTINUOUS
OUTPATIENT
Start: 2025-04-01

## 2025-03-25 RX ORDER — ALBUTEROL SULFATE 90 UG/1
4 INHALANT RESPIRATORY (INHALATION) PRN
OUTPATIENT
Start: 2025-04-01

## 2025-03-25 RX ORDER — EPINEPHRINE 1 MG/ML
0.3 INJECTION, SOLUTION, CONCENTRATE INTRAVENOUS PRN
OUTPATIENT
Start: 2025-04-01

## 2025-03-25 RX ORDER — HEPARIN 100 UNIT/ML
500 SYRINGE INTRAVENOUS PRN
OUTPATIENT
Start: 2025-04-01

## 2025-03-25 RX ORDER — DIPHENHYDRAMINE HYDROCHLORIDE 50 MG/ML
50 INJECTION, SOLUTION INTRAMUSCULAR; INTRAVENOUS
OUTPATIENT
Start: 2025-04-01

## 2025-03-25 RX ORDER — ACETAMINOPHEN 325 MG/1
650 TABLET ORAL
OUTPATIENT
Start: 2025-04-01

## 2025-03-25 RX ORDER — HYDROCORTISONE SODIUM SUCCINATE 100 MG/2ML
100 INJECTION INTRAMUSCULAR; INTRAVENOUS
OUTPATIENT
Start: 2025-04-01

## 2025-03-25 NOTE — PROGRESS NOTES
Kenyon Curtisiz Pool  2000 25 y.o.      Referring Physician:     PCP: No primary care provider on file.    Vitals:    25 1254   BP: 95/61   Pulse: 62   Temp: 98.1 °F (36.7 °C)   SpO2: 100%        Wt Readings from Last 3 Encounters:   25 79.9 kg (176 lb 3.2 oz)   25 78.9 kg (174 lb)   24 78.9 kg (174 lb)        Body mass index is 29.32 kg/m².          Chief Complaint:   Chief Complaint   Patient presents with    New Patient     ANEMIA           Cancer Staging   No matching staging information was found for the patient.      Prior Radiation Therapy? NO    Concurrent Chemo/radiation? NO    Prior Chemotherapy? NO    Prior Hormonal Therapy? NO    Head and Neck Cancer? No, patient does NOT have HN cancer.      LMP: 3/1/25    Age at first Menses: unknown    : 2    Para: 1          Current Outpatient Medications:     albuterol sulfate HFA (VENTOLIN HFA) 108 (90 Base) MCG/ACT inhaler, Inhale 2 puffs into the lungs 4 times daily as needed for Wheezing, Disp: 18 g, Rfl: 0    ondansetron (ZOFRAN-ODT) 4 MG disintegrating tablet, Take 1 tablet by mouth 3 times daily as needed for Nausea or Vomiting (Patient not taking: Reported on 3/25/2025), Disp: 21 tablet, Rfl: 0    dicyclomine (BENTYL) 10 MG capsule, Take 1 capsule by mouth 4 times daily (before meals and nightly) (Patient not taking: Reported on 3/25/2025), Disp: 30 capsule, Rfl: 0       Past Medical History:   Diagnosis Date    Asthma        Past Surgical History:   Procedure Laterality Date     SECTION N/A 2019     SECTION performed by Ashok Cook MD at [facility] L&D    TONSILLECTOMY         History reviewed. No pertinent family history.    Social History     Socioeconomic History    Marital status: Single     Spouse name: Not on file    Number of children: Not on file    Years of education: Not on file    Highest education level: Not on file   Occupational History    Not on file   Tobacco Use    Smoking status:

## 2025-03-25 NOTE — PROGRESS NOTES
[facility]  [address] [place] [id number]   Hematology/Oncology  Consult      Patient Name: Kenyon Pool  YOB: 2000  PCP: No primary care provider on file.   Referring Provider:      Reason for Consultation:   Chief Complaint   Patient presents with    New Patient     ANEMIA         History of Present Illness:  This pt is a very pleasant 24 yo female who presents today in referral for evaluation of anemia. She reportedly had a recent Hgb of 10.2, MCV 89, ferritin 3. WBC WL, platelets 452. TSH and B12 and folate WNL. She reportedly has heavy menstrual cycles, going through ~ 20 pads a day on her heaviest days. Normally 6 days long. She denies SOB or CP. Does have some fatigue. No other bleeding reported. Has been taking PO iron for months with no improvement     Diagnostic Data:     Past Medical History:   Diagnosis Date    Asthma        Patient Active Problem List    Diagnosis Date Noted    Iron deficiency anemia due to chronic blood loss 2025    Variable fetal heart rate decelerations, antepartum 2019    Non-reactive NST (non-stress test) 2019    Oligohydramnios in third trimester 2019    39 weeks gestation of pregnancy 2019    Abdominal pain affecting pregnancy     UTI (urinary tract infection) during pregnancy, third trimester     Obesity affecting pregnancy in third trimester     Right upper quadrant abdominal pain     Suspected problem with fetal growth not found 2018    Non-English speaking patient 2018    Chlamydia infection affecting pregnancy, antepartum, first trimester 2018        Past Surgical History:   Procedure Laterality Date    BARIATRIC SURGERY           SECTION N/A 2019     SECTION performed by Ashok Cook MD at [facility] L&D    TONSILLECTOMY         Family History  History reviewed. No pertinent family history.    Social History    TOBACCO:   reports that she has never

## 2025-03-25 NOTE — PROGRESS NOTES
Patient to get 3 doses Venofer. Per Dr. Bennett,   Venofer 400 mg followed by 300 mg x 2 weekly.   Pat Quiroga, JUANJOSEN, RN,OCN 3/25/25 4435

## 2025-03-26 ENCOUNTER — TELEPHONE (OUTPATIENT)
Dept: ONCOLOGY | Age: 25
End: 2025-03-26

## 2025-04-01 ENCOUNTER — TELEPHONE (OUTPATIENT)
Dept: ONCOLOGY | Age: 25
End: 2025-04-01

## 2025-04-01 NOTE — TELEPHONE ENCOUNTER
Attempt #2 to contact pt and schedule iron infusions. Pts main number is out of service. Pts secondary number rings to busy w/o option for voicemail. Attempted to contact pts boyfriend, provided on consent form, but no answer at this time. Will send pt Provident Linkt message and letter with request to please contact us.

## 2025-04-03 ENCOUNTER — HOSPITAL ENCOUNTER (OUTPATIENT)
Dept: INFUSION THERAPY | Age: 25
Discharge: HOME | End: 2025-04-03
Payer: MEDICAID

## 2025-04-03 VITALS
DIASTOLIC BLOOD PRESSURE: 64 MMHG | OXYGEN SATURATION: 100 % | HEART RATE: 70 BPM | SYSTOLIC BLOOD PRESSURE: 97 MMHG | TEMPERATURE: 98 F | RESPIRATION RATE: 18 BRPM

## 2025-04-03 DIAGNOSIS — D50.0 IRON DEFICIENCY ANEMIA DUE TO CHRONIC BLOOD LOSS: Primary | ICD-10-CM

## 2025-04-03 PROCEDURE — 2580000003 HC RX 258: Performed by: INTERNAL MEDICINE

## 2025-04-03 PROCEDURE — 96366 THER/PROPH/DIAG IV INF ADDON: CPT

## 2025-04-03 PROCEDURE — 96365 THER/PROPH/DIAG IV INF INIT: CPT

## 2025-04-03 PROCEDURE — 6360000002 HC RX W HCPCS: Performed by: INTERNAL MEDICINE

## 2025-04-03 PROCEDURE — 2500000003 HC RX 250 WO HCPCS: Performed by: INTERNAL MEDICINE

## 2025-04-03 RX ORDER — SODIUM CHLORIDE 0.9 % (FLUSH) 0.9 %
5-40 SYRINGE (ML) INJECTION PRN
OUTPATIENT
Start: 2025-04-10

## 2025-04-03 RX ORDER — FAMOTIDINE 10 MG/ML
20 INJECTION, SOLUTION INTRAVENOUS
OUTPATIENT
Start: 2025-04-10

## 2025-04-03 RX ORDER — DIPHENHYDRAMINE HYDROCHLORIDE 50 MG/ML
50 INJECTION, SOLUTION INTRAMUSCULAR; INTRAVENOUS
OUTPATIENT
Start: 2025-04-10

## 2025-04-03 RX ORDER — SODIUM CHLORIDE 9 MG/ML
5-250 INJECTION, SOLUTION INTRAVENOUS PRN
OUTPATIENT
Start: 2025-04-10

## 2025-04-03 RX ORDER — HEPARIN 100 UNIT/ML
500 SYRINGE INTRAVENOUS PRN
OUTPATIENT
Start: 2025-04-10

## 2025-04-03 RX ORDER — HYDROCORTISONE SODIUM SUCCINATE 100 MG/2ML
100 INJECTION INTRAMUSCULAR; INTRAVENOUS
OUTPATIENT
Start: 2025-04-10

## 2025-04-03 RX ORDER — ALBUTEROL SULFATE 90 UG/1
4 INHALANT RESPIRATORY (INHALATION) PRN
OUTPATIENT
Start: 2025-04-10

## 2025-04-03 RX ORDER — ACETAMINOPHEN 325 MG/1
650 TABLET ORAL
OUTPATIENT
Start: 2025-04-10

## 2025-04-03 RX ORDER — SODIUM CHLORIDE 0.9 % (FLUSH) 0.9 %
5-40 SYRINGE (ML) INJECTION PRN
Status: DISCONTINUED | OUTPATIENT
Start: 2025-04-03 | End: 2025-04-04 | Stop reason: HOSPADM

## 2025-04-03 RX ORDER — SODIUM CHLORIDE 9 MG/ML
INJECTION, SOLUTION INTRAVENOUS CONTINUOUS
OUTPATIENT
Start: 2025-04-10

## 2025-04-03 RX ORDER — SODIUM CHLORIDE 9 MG/ML
5-250 INJECTION, SOLUTION INTRAVENOUS PRN
Status: DISCONTINUED | OUTPATIENT
Start: 2025-04-03 | End: 2025-04-04 | Stop reason: HOSPADM

## 2025-04-03 RX ORDER — EPINEPHRINE 1 MG/ML
0.3 INJECTION, SOLUTION, CONCENTRATE INTRAVENOUS PRN
OUTPATIENT
Start: 2025-04-10

## 2025-04-03 RX ORDER — ONDANSETRON 2 MG/ML
8 INJECTION INTRAMUSCULAR; INTRAVENOUS
OUTPATIENT
Start: 2025-04-10

## 2025-04-03 RX ADMIN — SODIUM CHLORIDE, PRESERVATIVE FREE 10 ML: 5 INJECTION INTRAVENOUS at 10:22

## 2025-04-03 RX ADMIN — SODIUM CHLORIDE 20 ML/HR: 0.9 INJECTION, SOLUTION INTRAVENOUS at 10:22

## 2025-04-03 RX ADMIN — SODIUM CHLORIDE 400 MG: 9 INJECTION, SOLUTION INTRAVENOUS at 10:40

## 2025-04-03 NOTE — PROGRESS NOTES
Spiritual Health History and Assessment/Progress Note  [facility]    Initial Encounter, Spiritual/Emotional Needs,  ,  ,      Name: Kenyon Pool MRN: [id number]    Age: 25 y.o.     Sex: female   Language: Nigerien   Mandaen: None   <principal problem not specified>     Date: 4/3/2025                           Spiritual Assessment began in [facility]        Referral/Consult From: Rounding   Encounter Overview/Reason: Initial Encounter, Spiritual/Emotional Needs  Service Provided For: Patient    Kathy, Belief, Meaning:   Patient identifies as spiritual  Family/Friends No family/friends present      Importance and Influence:  Patient has spiritual/personal beliefs that influence decisions regarding their health  Family/Friends No family/friends present    Community:  Patient feels well-supported. Support system includes: Spouse/Partner  Family/Friends No family/friends present    Assessment and Plan of Care:     Patient Interventions include: Engaged in theological reflection  Family/Friends Interventions include: No family/friends present    Patient Plan of Care: Spiritual Care available upon further referral  Family/Friends Plan of Care: No family/friends present    Electronically signed by Chaplain Vanita on 4/3/2025 at 11:35 AM

## 2025-04-10 ENCOUNTER — HOSPITAL ENCOUNTER (OUTPATIENT)
Dept: INFUSION THERAPY | Age: 25
Discharge: HOME | End: 2025-04-10
Payer: MEDICAID

## 2025-04-10 VITALS
HEART RATE: 70 BPM | OXYGEN SATURATION: 100 % | SYSTOLIC BLOOD PRESSURE: 112 MMHG | RESPIRATION RATE: 16 BRPM | DIASTOLIC BLOOD PRESSURE: 59 MMHG | TEMPERATURE: 97.5 F

## 2025-04-10 DIAGNOSIS — D50.0 IRON DEFICIENCY ANEMIA DUE TO CHRONIC BLOOD LOSS: Primary | ICD-10-CM

## 2025-04-10 PROCEDURE — 96361 HYDRATE IV INFUSION ADD-ON: CPT

## 2025-04-10 PROCEDURE — 96365 THER/PROPH/DIAG IV INF INIT: CPT

## 2025-04-10 PROCEDURE — 6360000002 HC RX W HCPCS: Performed by: INTERNAL MEDICINE

## 2025-04-10 PROCEDURE — 2580000003 HC RX 258: Performed by: INTERNAL MEDICINE

## 2025-04-10 RX ORDER — SODIUM CHLORIDE 9 MG/ML
5-250 INJECTION, SOLUTION INTRAVENOUS PRN
OUTPATIENT
Start: 2025-04-24

## 2025-04-10 RX ORDER — DIPHENHYDRAMINE HYDROCHLORIDE 50 MG/ML
50 INJECTION, SOLUTION INTRAMUSCULAR; INTRAVENOUS
OUTPATIENT
Start: 2025-04-24

## 2025-04-10 RX ORDER — EPINEPHRINE 1 MG/ML
0.3 INJECTION, SOLUTION, CONCENTRATE INTRAVENOUS PRN
OUTPATIENT
Start: 2025-04-24

## 2025-04-10 RX ORDER — SODIUM CHLORIDE 9 MG/ML
INJECTION, SOLUTION INTRAVENOUS CONTINUOUS
OUTPATIENT
Start: 2025-04-24

## 2025-04-10 RX ORDER — FAMOTIDINE 10 MG/ML
20 INJECTION, SOLUTION INTRAVENOUS
OUTPATIENT
Start: 2025-04-24

## 2025-04-10 RX ORDER — ALBUTEROL SULFATE 90 UG/1
4 INHALANT RESPIRATORY (INHALATION) PRN
OUTPATIENT
Start: 2025-04-24

## 2025-04-10 RX ORDER — HYDROCORTISONE SODIUM SUCCINATE 100 MG/2ML
100 INJECTION INTRAMUSCULAR; INTRAVENOUS
OUTPATIENT
Start: 2025-04-24

## 2025-04-10 RX ORDER — SODIUM CHLORIDE 0.9 % (FLUSH) 0.9 %
5-40 SYRINGE (ML) INJECTION PRN
OUTPATIENT
Start: 2025-04-24

## 2025-04-10 RX ORDER — HEPARIN 100 UNIT/ML
500 SYRINGE INTRAVENOUS PRN
OUTPATIENT
Start: 2025-04-24

## 2025-04-10 RX ORDER — ACETAMINOPHEN 325 MG/1
650 TABLET ORAL
OUTPATIENT
Start: 2025-04-24

## 2025-04-10 RX ORDER — ONDANSETRON 2 MG/ML
8 INJECTION INTRAMUSCULAR; INTRAVENOUS
OUTPATIENT
Start: 2025-04-24

## 2025-04-10 RX ORDER — SODIUM CHLORIDE 9 MG/ML
5-250 INJECTION, SOLUTION INTRAVENOUS PRN
Status: DISCONTINUED | OUTPATIENT
Start: 2025-04-10 | End: 2025-04-11 | Stop reason: HOSPADM

## 2025-04-10 RX ADMIN — SODIUM CHLORIDE 300 MG: 9 INJECTION, SOLUTION INTRAVENOUS at 10:08

## 2025-04-10 RX ADMIN — SODIUM CHLORIDE 20 ML/HR: 0.9 INJECTION, SOLUTION INTRAVENOUS at 09:49

## 2025-04-10 NOTE — PROGRESS NOTES
Spiritual Health History and Assessment/Progress Note  [facility]    Follow-up,  ,  ,      Name: Kenyon Pool MRN: [id number]    Age: 25 y.o.     Sex: female   Language: Anguillan   Taoism: None   <principal problem not specified>     Date: 4/10/2025                           Spiritual Assessment continued in [facility]        Referral/Consult From: Rounding   Encounter Overview/Reason: Follow-up  Service Provided For: Patient    Kathy, Belief, Meaning:   Patient is connected with a kathy tradition or spiritual practice  Family/Friends No family/friends present      Importance and Influence:  Patient has spiritual/personal beliefs that influence decisions regarding their health  Family/Friends No family/friends present    Community:  Patient is connected with a spiritual community  Family/Friends No family/friends present    Assessment and Plan of Care:     Patient Interventions include: Engaged in theological reflection  Family/Friends Interventions include: No family/friends present    Patient Plan of Care: Spiritual Care available upon further referral  Family/Friends Plan of Care: No family/friends present    Electronically signed by Chaplain Vanita on 4/10/2025 at 1:08 PM

## 2025-04-22 ENCOUNTER — HOSPITAL ENCOUNTER (EMERGENCY)
Age: 25
Discharge: HOME | End: 2025-04-22
Payer: MEDICAID

## 2025-04-22 ENCOUNTER — APPOINTMENT (OUTPATIENT)
Dept: ULTRASOUND IMAGING | Age: 25
End: 2025-04-22
Payer: MEDICAID

## 2025-04-22 VITALS
OXYGEN SATURATION: 100 % | HEART RATE: 66 BPM | TEMPERATURE: 97.9 F | DIASTOLIC BLOOD PRESSURE: 60 MMHG | SYSTOLIC BLOOD PRESSURE: 105 MMHG | RESPIRATION RATE: 18 BRPM

## 2025-04-22 DIAGNOSIS — Z33.1 IUP (INTRAUTERINE PREGNANCY), INCIDENTAL: Primary | ICD-10-CM

## 2025-04-22 DIAGNOSIS — R82.71 BACTERIA IN URINE: ICD-10-CM

## 2025-04-22 LAB
ABO + RH BLD: NORMAL
ALBUMIN SERPL-MCNC: 4.4 G/DL (ref 3.5–5.2)
ALP SERPL-CCNC: 53 U/L (ref 35–104)
ALT SERPL-CCNC: 13 U/L (ref 0–32)
ANION GAP SERPL CALCULATED.3IONS-SCNC: 11 MMOL/L (ref 7–16)
AST SERPL-CCNC: 17 U/L (ref 0–31)
B-HCG SERPL EIA 3RD IS-ACNC: ABNORMAL MIU/ML (ref 0–7)
BACTERIA URNS QL MICRO: ABNORMAL
BASOPHILS # BLD: 0.04 K/UL (ref 0–0.2)
BASOPHILS NFR BLD: 1 % (ref 0–2)
BILIRUB SERPL-MCNC: 0.2 MG/DL (ref 0–1.2)
BILIRUB UR QL STRIP: NEGATIVE
BUN SERPL-MCNC: 8 MG/DL (ref 6–20)
CALCIUM SERPL-MCNC: 9.7 MG/DL (ref 8.6–10.2)
CHLORIDE SERPL-SCNC: 104 MMOL/L (ref 98–107)
CLARITY UR: CLEAR
CLUE CELLS VAG QL WET PREP: NORMAL
CO2 SERPL-SCNC: 19 MMOL/L (ref 22–29)
COLOR UR: YELLOW
CREAT SERPL-MCNC: 0.6 MG/DL (ref 0.5–1)
EOSINOPHIL # BLD: 0.12 K/UL (ref 0.05–0.5)
EOSINOPHILS RELATIVE PERCENT: 2 % (ref 0–6)
EPI CELLS #/AREA URNS HPF: ABNORMAL /HPF
ERYTHROCYTE [DISTWIDTH] IN BLOOD BY AUTOMATED COUNT: 16.5 % (ref 11.5–15)
FLUAV RNA RESP QL NAA+PROBE: NOT DETECTED
FLUBV RNA RESP QL NAA+PROBE: NOT DETECTED
GFR, ESTIMATED: >90 ML/MIN/1.73M2
GLUCOSE SERPL-MCNC: 84 MG/DL (ref 74–99)
GLUCOSE UR STRIP-MCNC: NEGATIVE MG/DL
HCG, URINE, POC: POSITIVE
HCT VFR BLD AUTO: 34.5 % (ref 34–48)
HGB BLD-MCNC: 11.5 G/DL (ref 11.5–15.5)
HGB UR QL STRIP.AUTO: NEGATIVE
IMM GRANULOCYTES # BLD AUTO: <0.03 K/UL (ref 0–0.58)
IMM GRANULOCYTES NFR BLD: 0 % (ref 0–5)
KETONES UR STRIP-MCNC: NEGATIVE MG/DL
LEUKOCYTE ESTERASE UR QL STRIP: NEGATIVE
LYMPHOCYTES NFR BLD: 1.85 K/UL (ref 1.5–4)
LYMPHOCYTES RELATIVE PERCENT: 30 % (ref 20–42)
Lab: NORMAL
MCH RBC QN AUTO: 29.6 PG (ref 26–35)
MCHC RBC AUTO-ENTMCNC: 33.3 G/DL (ref 32–34.5)
MCV RBC AUTO: 88.9 FL (ref 80–99.9)
MONOCYTES NFR BLD: 0.61 K/UL (ref 0.1–0.95)
MONOCYTES NFR BLD: 10 % (ref 2–12)
NEGATIVE QC PASS/FAIL: NORMAL
NEUTROPHILS NFR BLD: 58 % (ref 43–80)
NEUTS SEG NFR BLD: 3.63 K/UL (ref 1.8–7.3)
NITRITE UR QL STRIP: NEGATIVE
PH UR STRIP: 6 [PH] (ref 5–8)
PLATELET # BLD AUTO: 292 K/UL (ref 130–450)
PMV BLD AUTO: 9.5 FL (ref 7–12)
POSITIVE QC PASS/FAIL: NORMAL
POTASSIUM SERPL-SCNC: 4 MMOL/L (ref 3.5–5)
PROT SERPL-MCNC: 7.2 G/DL (ref 6.4–8.3)
PROT UR STRIP-MCNC: NEGATIVE MG/DL
RBC # BLD AUTO: 3.88 M/UL (ref 3.5–5.5)
RBC #/AREA URNS HPF: ABNORMAL /HPF
SARS-COV-2 RNA RESP QL NAA+PROBE: NOT DETECTED
SODIUM SERPL-SCNC: 134 MMOL/L (ref 132–146)
SOURCE WET PREP: NORMAL
SOURCE: NORMAL
SP GR UR STRIP: 1.02 (ref 1–1.03)
SPECIMEN DESCRIPTION: NORMAL
SPECIMEN SOURCE: NORMAL
STREP A, MOLECULAR: NEGATIVE
T VAGINALIS VAG QL WET PREP: NORMAL
UROBILINOGEN UR STRIP-ACNC: 0.2 EU/DL (ref 0–1)
WBC #/AREA URNS HPF: ABNORMAL /HPF
WBC OTHER # BLD: 6.3 K/UL (ref 4.5–11.5)
YEAST WET PREP: NORMAL

## 2025-04-22 PROCEDURE — 80053 COMPREHEN METABOLIC PANEL: CPT

## 2025-04-22 PROCEDURE — 87086 URINE CULTURE/COLONY COUNT: CPT

## 2025-04-22 PROCEDURE — 87210 SMEAR WET MOUNT SALINE/INK: CPT

## 2025-04-22 PROCEDURE — 76801 OB US < 14 WKS SINGLE FETUS: CPT

## 2025-04-22 PROCEDURE — 81001 URINALYSIS AUTO W/SCOPE: CPT

## 2025-04-22 PROCEDURE — 87651 STREP A DNA AMP PROBE: CPT

## 2025-04-22 PROCEDURE — 87491 CHLMYD TRACH DNA AMP PROBE: CPT

## 2025-04-22 PROCEDURE — 99284 EMERGENCY DEPT VISIT MOD MDM: CPT

## 2025-04-22 PROCEDURE — 87591 N.GONORRHOEAE DNA AMP PROB: CPT

## 2025-04-22 PROCEDURE — 86901 BLOOD TYPING SEROLOGIC RH(D): CPT

## 2025-04-22 PROCEDURE — 87636 SARSCOV2 & INF A&B AMP PRB: CPT

## 2025-04-22 PROCEDURE — 84702 CHORIONIC GONADOTROPIN TEST: CPT

## 2025-04-22 PROCEDURE — 86900 BLOOD TYPING SEROLOGIC ABO: CPT

## 2025-04-22 PROCEDURE — 85025 COMPLETE CBC W/AUTO DIFF WBC: CPT

## 2025-04-22 RX ORDER — CEPHALEXIN 500 MG/1
500 CAPSULE ORAL 4 TIMES DAILY
Qty: 28 CAPSULE | Refills: 0 | Status: SHIPPED | OUTPATIENT
Start: 2025-04-22 | End: 2025-04-29

## 2025-04-22 NOTE — ED PROVIDER NOTES
Independent CARMEN Visit.   HPI:  25, Time: 11:51 AM EDT         Kenyon Pool is a 25 y.o. female presenting to the ED for abdominal pain nausea vomiting, cough, beginning  2 days  ago.  The complaint has been persistent, moderate in severity, and worsened by cough.    Cough chest discomfort with cough only  generalized abdominal pain nausea vomiting x 2 days last menses  generalized body aches    Patient comes in with complaint of nonproductive cough started 2 days ago with fever chills body aches.  Chest discomfort with cough only.  No shortness of breath she also complains of lower abdominal pain with nausea vomiting anytime she eats or drinks.  Denies any diarrhea constipation.  Denies any urinary frequency urgency burning.  No vaginal discharge patient's last menses was , no vaginal bleeding.      Review of Systems:   A complete review of systems was performed and pertinent positives and negatives are stated within HPI, all other systems reviewed and are negative.          --------------------------------------------- PAST HISTORY ---------------------------------------------  Past Medical History:  has a past medical history of Asthma.    Past Surgical History:  has a past surgical history that includes Tonsillectomy;  section (N/A, 2019); and Bariatric Surgery.    Social History:  reports that she has never smoked. She has never used smokeless tobacco. She reports current drug use. Drug: Marijuana (Weed). She reports that she does not drink alcohol.    Family History: family history is not on file.     The patient’s home medications have been reviewed.    Allergies: Norco [hydrocodone-acetaminophen] and Penicillins    -------------------------------------------------- RESULTS -------------------------------------------------  All laboratory and radiology results have been personally reviewed by myself   LABS:  Results for orders placed or performed during  EENMT

## 2025-04-23 LAB
MICROORGANISM SPEC CULT: ABNORMAL
SERVICE CMNT-IMP: ABNORMAL
SPECIMEN DESCRIPTION: ABNORMAL

## 2025-04-24 ENCOUNTER — HOSPITAL ENCOUNTER (OUTPATIENT)
Age: 25
Discharge: HOME | End: 2025-04-24
Payer: MEDICAID

## 2025-04-24 DIAGNOSIS — R82.71 BACTERIA IN URINE: ICD-10-CM

## 2025-04-24 LAB — B-HCG SERPL EIA 3RD IS-ACNC: ABNORMAL MIU/ML (ref 0–7)

## 2025-04-24 PROCEDURE — 84702 CHORIONIC GONADOTROPIN TEST: CPT

## 2025-04-24 PROCEDURE — 36415 COLL VENOUS BLD VENIPUNCTURE: CPT

## 2025-04-25 LAB
CHLAMYDIA DNA UR QL NAA+PROBE: NEGATIVE
N GONORRHOEA DNA UR QL NAA+PROBE: NEGATIVE
SPECIMEN DESCRIPTION: NORMAL

## 2025-04-27 ENCOUNTER — APPOINTMENT (OUTPATIENT)
Dept: ULTRASOUND IMAGING | Age: 25
End: 2025-04-27
Payer: MEDICAID

## 2025-04-27 ENCOUNTER — HOSPITAL ENCOUNTER (EMERGENCY)
Age: 25
Discharge: HOME | End: 2025-04-27
Attending: EMERGENCY MEDICINE
Payer: MEDICAID

## 2025-04-27 VITALS
SYSTOLIC BLOOD PRESSURE: 105 MMHG | DIASTOLIC BLOOD PRESSURE: 57 MMHG | OXYGEN SATURATION: 99 % | TEMPERATURE: 98.3 F | RESPIRATION RATE: 16 BRPM | BODY MASS INDEX: 28.62 KG/M2 | WEIGHT: 172 LBS | HEART RATE: 74 BPM

## 2025-04-27 DIAGNOSIS — O26.899 PELVIC PAIN DURING PREGNANCY: Primary | ICD-10-CM

## 2025-04-27 DIAGNOSIS — R10.2 PELVIC PAIN DURING PREGNANCY: Primary | ICD-10-CM

## 2025-04-27 LAB
B-HCG SERPL EIA 3RD IS-ACNC: ABNORMAL MIU/ML (ref 0–7)
BILIRUB UR QL STRIP: NEGATIVE
CLARITY UR: CLEAR
COLOR UR: YELLOW
GLUCOSE UR STRIP-MCNC: NEGATIVE MG/DL
HGB UR QL STRIP.AUTO: NEGATIVE
KETONES UR STRIP-MCNC: 15 MG/DL
LEUKOCYTE ESTERASE UR QL STRIP: NEGATIVE
NITRITE UR QL STRIP: NEGATIVE
PH UR STRIP: 6 [PH] (ref 5–8)
PROT UR STRIP-MCNC: NEGATIVE MG/DL
RBC #/AREA URNS HPF: ABNORMAL /HPF
SP GR UR STRIP: 1.02 (ref 1–1.03)
UROBILINOGEN UR STRIP-ACNC: 0.2 EU/DL (ref 0–1)
WBC #/AREA URNS HPF: ABNORMAL /HPF

## 2025-04-27 PROCEDURE — 76801 OB US < 14 WKS SINGLE FETUS: CPT

## 2025-04-27 PROCEDURE — 99284 EMERGENCY DEPT VISIT MOD MDM: CPT

## 2025-04-27 PROCEDURE — 87086 URINE CULTURE/COLONY COUNT: CPT

## 2025-04-27 PROCEDURE — 84702 CHORIONIC GONADOTROPIN TEST: CPT

## 2025-04-27 PROCEDURE — 6370000000 HC RX 637 (ALT 250 FOR IP): Performed by: EMERGENCY MEDICINE

## 2025-04-27 PROCEDURE — 81001 URINALYSIS AUTO W/SCOPE: CPT

## 2025-04-27 RX ORDER — ONDANSETRON 4 MG/1
4 TABLET, ORALLY DISINTEGRATING ORAL ONCE
Status: COMPLETED | OUTPATIENT
Start: 2025-04-27 | End: 2025-04-27

## 2025-04-27 RX ADMIN — ONDANSETRON 4 MG: 4 TABLET, ORALLY DISINTEGRATING ORAL at 13:08

## 2025-04-27 NOTE — ED PROVIDER NOTES
this time the patient is without objective evidence of an acute process requiring hospitalization or inpatient management.  They have remained hemodynamically stable throughout their entire ED visit and are stable for discharge with outpatient follow-up.     The plan has been discussed in detail and they are aware of the specific conditions for emergent return, as well as the importance of follow-up.      New Prescriptions    No medications on file       Diagnosis:  1. Pelvic pain during pregnancy        Disposition:  Patient's disposition: Discharge to home  Patient's condition is stable.            Fadia Ly DO  04/27/25 1538

## 2025-04-27 NOTE — ED NOTES
Department of Emergency Medicine  FIRST PROVIDER TRIAGE NOTE             Independent MLP           4/27/25  11:15 AM EDT    Date of Encounter: 4/27/25   MRN: [id number]      HPI: Kenyon Pool is a 25 y.o. female who presents to the ED for Abdominal Pain (PAIN STARTED TODAY, VOMITING FOR WEEKS, STATED SHE WAS SEEN 2 WEEKS AGO AND WAS TOLD SHE WAS 6 WEEKS ALONG AT THAT TIME, HX PREVIOUS MISCARRIAGE, DENIES ANY VAGINAL BLEEDING)       ROS: Negative for fever.    PE: Gen Appearance/Constitutional: alert  Musculoskeletal: moves all extremities x 4    Initial Plan of Care: All treatment areas with department are currently occupied.      Plan to order/Initiate the following while awaiting opening in ED: Triage evaluation  .     Provider-Patient relationship only established for Provider In Triage (PIT).  Full assessment, HPI and examination not performed, therefore, it is not yet possible to state whether or not an emergency medical condition exists     Initial Plan of Care: Initiate Treatment-Testing, Proceed toTreatment Area When Bed Available for ED Attending/MLP to Continue Care  Secondary to high volume, low staffing, and/or boarding- patient to await bed availability.     This ends my PIT-Patient relationship.  Care of patient relinquished after triage         Electronically signed by PAZ Kaplan CNP   DD: 4/27/25

## 2025-04-28 ENCOUNTER — RESULTS FOLLOW-UP (OUTPATIENT)
Dept: PHARMACY | Age: 25
End: 2025-04-28

## 2025-04-28 LAB
MICROORGANISM SPEC CULT: ABNORMAL
MICROORGANISM SPEC CULT: ABNORMAL
SERVICE CMNT-IMP: ABNORMAL
SPECIMEN DESCRIPTION: ABNORMAL

## 2025-04-30 LAB
ABO GROUPING: NORMAL
ANTIBODY SCREEN: NORMAL
BACTERIA: NORMAL /HPF
COMMENT: NORMAL
EPITHELIAL CELLS, UA: NORMAL /HPF
HCT VFR BLD CALC: 35.2 % (ref 35–45)
HEMOGLOBIN: 11.8 G/DL (ref 11.7–15.5)
HEP B S AGB SURF AG: NORMAL
HEPATITIS B CORE IGM ANTIBODY: NORMAL
HEPATITIS C ANTIBODY: NORMAL
HIV 1+2 AB+HIV1P24 AG, EIA: NORMAL
HYALINE CASTS: NORMAL /LPF
LEUKOCYTES, UA: NORMAL /HPF
MCH RBC QN AUTO: 30.6 PG (ref 27–33)
MCHC RBC AUTO-ENTMCNC: 33.5 G/DL (ref 32–36)
MCV RBC AUTO: 91.2 FL (ref 80–100)
PDW BLD-RTO: 15.9 % (ref 11–15)
PLATELET # BLD: 300 THOUSAND/UL (ref 140–400)
PMV BLD AUTO: 9.8 FL (ref 7.5–12.5)
RBC # BLD: 3.86 MILLION/UL (ref 3.8–5.1)
RBC UA: NORMAL /HPF
RH FACTOR: NORMAL
RPR: NORMAL
RUBELLA ANTIBODY IGG: 4.57 INDEX
VZV IGG SER QL IA: <1 S/CO
WBC # BLD: 10 THOUSAND/UL (ref 3.8–10.8)

## 2025-05-01 LAB — CULTURE: NORMAL

## 2025-05-23 ENCOUNTER — APPOINTMENT (OUTPATIENT)
Dept: ULTRASOUND IMAGING | Age: 25
End: 2025-05-23
Payer: MEDICAID

## 2025-05-23 ENCOUNTER — HOSPITAL ENCOUNTER (EMERGENCY)
Age: 25
Discharge: LEFT BEFORE DISCHARGE | End: 2025-05-24
Attending: STUDENT IN AN ORGANIZED HEALTH CARE EDUCATION/TRAINING PROGRAM
Payer: MEDICAID

## 2025-05-23 VITALS
TEMPERATURE: 98.7 F | RESPIRATION RATE: 16 BRPM | DIASTOLIC BLOOD PRESSURE: 68 MMHG | OXYGEN SATURATION: 98 % | HEART RATE: 70 BPM | SYSTOLIC BLOOD PRESSURE: 116 MMHG

## 2025-05-23 DIAGNOSIS — R10.84 GENERALIZED ABDOMINAL PAIN: ICD-10-CM

## 2025-05-23 DIAGNOSIS — Z53.29 LEFT AGAINST MEDICAL ADVICE: ICD-10-CM

## 2025-05-23 DIAGNOSIS — R51.9 NONINTRACTABLE HEADACHE, UNSPECIFIED CHRONICITY PATTERN, UNSPECIFIED HEADACHE TYPE: Primary | ICD-10-CM

## 2025-05-23 PROCEDURE — 76801 OB US < 14 WKS SINGLE FETUS: CPT

## 2025-05-23 PROCEDURE — 99284 EMERGENCY DEPT VISIT MOD MDM: CPT

## 2025-05-24 RX ORDER — 0.9 % SODIUM CHLORIDE 0.9 %
1000 INTRAVENOUS SOLUTION INTRAVENOUS ONCE
Status: DISCONTINUED | OUTPATIENT
Start: 2025-05-24 | End: 2025-05-24 | Stop reason: HOSPADM

## 2025-05-24 RX ORDER — ACETAMINOPHEN 500 MG
1000 TABLET ORAL ONCE
Status: DISCONTINUED | OUTPATIENT
Start: 2025-05-24 | End: 2025-05-24 | Stop reason: HOSPADM

## 2025-05-24 NOTE — ED NOTES
Pt stated she needed to leave to get her son;  Notified and he explained to patient she would have to sign out AMA form; pt understood

## 2025-05-24 NOTE — DISCHARGE INSTR - COC
Continuity of Care Form    Patient Name: Kenyon Pool   :  2000  MRN:  [id number]    Admit date:  2025  Discharge date:  ***    Code Status Order: Prior   Advance Directives:     Admitting Physician:  No admitting provider for patient encounter.  PCP: No primary care provider on file.    Discharging Nurse: ***  Discharging Hospital Unit/Room#: RADHA/RADHA  Discharging Unit Phone Number: ***    Emergency Contact:   Extended Emergency Contact Information  Primary Emergency Contact: JALEESA POOL  Home Phone: [phone]  Mobile Phone: [phone]  Relation: Parent   needed? No    Past Surgical History:  Past Surgical History:   Procedure Laterality Date    BARIATRIC SURGERY           SECTION N/A 2019     [facility]         Immunization History:   Immunization History   Administered Date(s) Administered    COVID-19, PFIZER GRAY top, DO NOT Dilute, (age 12 y+), IM, 30 mcg/0.3 mL 2022    Influenza, FLUCELVAX, (age 6 mo+), MDCK, Quadv PF, 0.5mL 10/03/2018    TDaP, ADACEL (age 10y-64y), BOOSTRIX (age 10y+), IM, 0.5mL 10/30/2018       Active Problems:  Patient Active Problem List   Diagnosis Code    Chlamydia infection affecting pregnancy, antepartum, first trimester O98.811, A74.9    Non-English speaking patient Z78.9    Suspected problem with fetal growth not found Z03.74    Right upper quadrant abdominal pain R10.11    Abdominal pain affecting pregnancy O26.899, R10.9    UTI (urinary tract infection) during pregnancy, third trimester O23.43    Obesity affecting pregnancy in third trimester O99.213    Variable fetal heart rate decelerations, antepartum O36.8390    Non-reactive NST (non-stress test) O28.8    Oligohydramnios in third trimester O41.03X0    39 weeks gestation of pregnancy Z3A.39    Iron deficiency anemia due to chronic blood loss D50.0       Isolation/Infection:   Isolation            No

## 2025-05-24 NOTE — DISCHARGE INSTRUCTIONS
You decided to leave AGAINST MEDICAL ADVICE  If you notice any new worrisome symptoms you may return to emergency room at anytime  US OB LESS THAN 14 WEEKS SINGLE OR FIRST GESTATION   Final Result   Intrauterine pregnancy with embryonic/fetal cardiac activity. Estimated   gestational age by current ultrasound is 11 weeks 2 days.

## 2025-05-26 NOTE — ED PROVIDER NOTES
[facility] EMERGENCY DEPARTMENT  EMERGENCY DEPARTMENT ENCOUNTER      Pt Name: Kenyon Pool  MRN: [id number]  Birthdate 2000  Date of evaluation: 2025  Provider: Javier Eid DO  PCP: No primary care provider on file.    CHIEF COMPLAINT       Chief Complaint   Patient presents with    Headache     Headache since yesterday    Abdominal Pain     Lower abd pain since this morning       HISTORY OF PRESENT ILLNESS: 1 or more Elements   History From: Patient  Limitations to history : None    Kenyon Pool is a 25 y.o. female Past medical history of bariatric surgery, iron deficiency anemia.  Patient currently 11 weeks pregnant.  Patient presents with chief complaint of some headache as well as some lower abdominal pain.  States that symptoms began yesterday.  Patient notes a mild dull headache as well as some mild lower abdominal pain.  Patient denies any vaginal bleeding or discharge.  Patient denies any fevers, chills, chest pain, cough, abdominal pain, constipation or diarrhea    Nursing Notes were all reviewed and agreed with or any disagreements were addressed in the HPI.    REVIEW OF SYSTEMS :    Positives and Pertinent negatives as per HPI.     PAST MEDICAL HISTORY/Chronic Conditions Affecting Care    has a past medical history of Asthma.     SURGICAL HISTORY     Past Surgical History:   Procedure Laterality Date    BARIATRIC SURGERY           SECTION N/A 2019     SECTION performed by Ashok Cook MD at [facility] L&D    TONSILLECTOMY         CURRENTMEDICATIONS       Discharge Medication List as of 2025 12:45 AM        CONTINUE these medications which have NOT CHANGED    Details   albuterol sulfate HFA (VENTOLIN HFA) 108 (90 Base) MCG/ACT inhaler Inhale 2 puffs into the lungs 4 times daily as needed for Wheezing, Disp-18 g, R-0Normal      ondansetron (ZOFRAN-ODT) 4 MG disintegrating tablet Take 1 tablet by mouth 3 times daily as needed

## 2025-06-11 ENCOUNTER — APPOINTMENT (OUTPATIENT)
Dept: ULTRASOUND IMAGING | Age: 25
End: 2025-06-11
Payer: MEDICAID

## 2025-06-11 ENCOUNTER — HOSPITAL ENCOUNTER (EMERGENCY)
Age: 25
Discharge: HOME | End: 2025-06-12
Attending: EMERGENCY MEDICINE
Payer: MEDICAID

## 2025-06-11 VITALS
BODY MASS INDEX: 28.99 KG/M2 | TEMPERATURE: 97.6 F | SYSTOLIC BLOOD PRESSURE: 102 MMHG | HEIGHT: 65 IN | RESPIRATION RATE: 16 BRPM | WEIGHT: 174 LBS | HEART RATE: 86 BPM | DIASTOLIC BLOOD PRESSURE: 56 MMHG | OXYGEN SATURATION: 98 %

## 2025-06-11 DIAGNOSIS — R10.9 ABDOMINAL PAIN DURING PREGNANCY IN FIRST TRIMESTER: Primary | ICD-10-CM

## 2025-06-11 DIAGNOSIS — O26.891 ABDOMINAL PAIN DURING PREGNANCY IN FIRST TRIMESTER: Primary | ICD-10-CM

## 2025-06-11 PROCEDURE — 76801 OB US < 14 WKS SINGLE FETUS: CPT

## 2025-06-11 PROCEDURE — 99284 EMERGENCY DEPT VISIT MOD MDM: CPT

## 2025-06-11 PROCEDURE — 81001 URINALYSIS AUTO W/SCOPE: CPT

## 2025-06-11 PROCEDURE — 93975 VASCULAR STUDY: CPT

## 2025-06-11 ASSESSMENT — PAIN DESCRIPTION - LOCATION: LOCATION: ABDOMEN;FLANK

## 2025-06-11 ASSESSMENT — PAIN - FUNCTIONAL ASSESSMENT
PAIN_FUNCTIONAL_ASSESSMENT: ACTIVITIES ARE NOT PREVENTED
PAIN_FUNCTIONAL_ASSESSMENT: 0-10

## 2025-06-11 ASSESSMENT — PAIN DESCRIPTION - DESCRIPTORS: DESCRIPTORS: DISCOMFORT

## 2025-06-11 ASSESSMENT — PAIN SCALES - GENERAL: PAINLEVEL_OUTOF10: 9

## 2025-06-11 ASSESSMENT — PAIN DESCRIPTION - ORIENTATION: ORIENTATION: LEFT

## 2025-06-11 ASSESSMENT — PAIN DESCRIPTION - PAIN TYPE: TYPE: ACUTE PAIN

## 2025-06-12 LAB
BILIRUB UR QL STRIP: NEGATIVE
CLARITY UR: ABNORMAL
COLOR UR: YELLOW
GLUCOSE UR STRIP-MCNC: NEGATIVE MG/DL
HGB UR QL STRIP.AUTO: NEGATIVE
KETONES UR STRIP-MCNC: NEGATIVE MG/DL
LEUKOCYTE ESTERASE UR QL STRIP: ABNORMAL
NITRITE UR QL STRIP: NEGATIVE
PH UR STRIP: 7 [PH] (ref 5–8)
PROT UR STRIP-MCNC: NEGATIVE MG/DL
RBC #/AREA URNS HPF: ABNORMAL /HPF
SP GR UR STRIP: 1.02 (ref 1–1.03)
UROBILINOGEN UR STRIP-ACNC: 0.2 EU/DL (ref 0–1)
WBC #/AREA URNS HPF: ABNORMAL /HPF

## 2025-06-12 NOTE — ED PROVIDER NOTES
[facility] EMERGENCY DEPARTMENT  EMERGENCY DEPARTMENT ENCOUNTER        Pt Name: Kenyon Pool  MRN: [id number]  Birthdate 2000  Date of evaluation: 2025  Provider: Mojgan Gleason DO  PCP: No primary care provider on file.  Note Started: 10:37 PM EDT 25    CHIEF COMPLAINT       Chief Complaint   Patient presents with    Abdominal Pain     Left flank that wraps around to left abd, started at 1800, no N/V/D, denies hx of kidney stones or urinary issues       HISTORY OF PRESENT ILLNESS: 1 or more Elements   History From: Patient    Limitations to history : None    Kenyon Pool is a 25 y.o. female who presents with concern for lower abdominal pain.  Notes she is 13 weeks pregnant, she began to have lower pelvic pain, worse on the left starting this morning.  Not associated with changes with urination, no nausea vomiting, diarrhea or constipation, he is not any chest pain or difficulty breathing.  It does not radiate to her back or into her abdomen otherwise.  Notes this is her third pregnancy, she had 1 and did have another miscarriage, no other associated complaints.      EXTERNAL NOTE REVIEW:      On chart review last OB/GYN on 2025    REVIEW OF SYSTEMS :      Positives and Pertinent negatives as per HPI.     SURGICAL HISTORY     Past Surgical History:   Procedure Laterality Date    BARIATRIC SURGERY           SECTION N/A 2019     SECTION performed by Ashok Cook MD at [facility] L&D    TONSILLECTOMY         CURRENTMEDICATIONS       Previous Medications    ALBUTEROL SULFATE HFA (VENTOLIN HFA) 108 (90 BASE) MCG/ACT INHALER    Inhale 2 puffs into the lungs 4 times daily as needed for Wheezing    DICYCLOMINE (BENTYL) 10 MG CAPSULE    Take 1 capsule by mouth 4 times daily (before meals and nightly)    ONDANSETRON (ZOFRAN-ODT) 4 MG DISINTEGRATING TABLET    Take 1 tablet by mouth 3 times daily as needed for Nausea or Vomiting  first trimester          DISPOSITION/PLAN     DISPOSITION Decision To Discharge 06/12/2025 12:27:33 AM      PATIENT REFERRED TO:  [facility]  [phone]  Call in 1 day  follow up for PCP, follow up    Real Hidalgo MD  [address]  [facility]  [facility] [address]  [phone]    Call   follow up      DISCHARGE MEDICATIONS:  New Prescriptions    No medications on file       DISCONTINUED MEDICATIONS:  Discontinued Medications    No medications on file              (Please note that portions of this note were completed with a voice recognition program.  Efforts were made to edit the dictations but occasionally words are mis-transcribed.)    Mojgan Gleason DO (electronically signed)            Mojgan Gleason DO  06/12/25 0030

## 2025-08-12 ENCOUNTER — HOSPITAL ENCOUNTER (OUTPATIENT)
Age: 25
Discharge: HOME | End: 2025-08-12
Attending: OBSTETRICS & GYNECOLOGY | Admitting: OBSTETRICS & GYNECOLOGY
Payer: MEDICAID

## 2025-08-12 VITALS
SYSTOLIC BLOOD PRESSURE: 93 MMHG | OXYGEN SATURATION: 100 % | DIASTOLIC BLOOD PRESSURE: 51 MMHG | RESPIRATION RATE: 20 BRPM | TEMPERATURE: 98 F | HEART RATE: 78 BPM

## 2025-08-12 PROBLEM — Z78.9 ADMITTED TO LABOR AND DELIVERY: Status: ACTIVE | Noted: 2025-08-12

## 2025-08-12 LAB
BACTERIA URNS QL MICRO: ABNORMAL
BILIRUB UR QL STRIP: NEGATIVE
CLARITY UR: ABNORMAL
COLOR UR: YELLOW
EPI CELLS #/AREA URNS HPF: ABNORMAL /HPF
GLUCOSE UR STRIP-MCNC: NEGATIVE MG/DL
HGB UR QL STRIP.AUTO: NEGATIVE
KETONES UR STRIP-MCNC: ABNORMAL MG/DL
LEUKOCYTE ESTERASE UR QL STRIP: ABNORMAL
NITRITE UR QL STRIP: NEGATIVE
PH UR STRIP: 6 [PH] (ref 5–8)
PROT UR STRIP-MCNC: NEGATIVE MG/DL
RBC #/AREA URNS HPF: ABNORMAL /HPF
SP GR UR STRIP: 1.02 (ref 1–1.03)
UROBILINOGEN UR STRIP-ACNC: 0.2 EU/DL (ref 0–1)
WBC #/AREA URNS HPF: ABNORMAL /HPF

## 2025-08-12 PROCEDURE — 96361 HYDRATE IV INFUSION ADD-ON: CPT

## 2025-08-12 PROCEDURE — 81001 URINALYSIS AUTO W/SCOPE: CPT

## 2025-08-12 PROCEDURE — 96360 HYDRATION IV INFUSION INIT: CPT

## 2025-08-12 PROCEDURE — 99202 OFFICE O/P NEW SF 15 MIN: CPT

## 2025-08-12 PROCEDURE — 2580000003 HC RX 258: Performed by: OBSTETRICS & GYNECOLOGY

## 2025-08-12 RX ORDER — SODIUM CHLORIDE, SODIUM LACTATE, POTASSIUM CHLORIDE, CALCIUM CHLORIDE 600; 310; 30; 20 MG/100ML; MG/100ML; MG/100ML; MG/100ML
INJECTION, SOLUTION INTRAVENOUS CONTINUOUS
Status: DISCONTINUED | OUTPATIENT
Start: 2025-08-12 | End: 2025-08-12 | Stop reason: HOSPADM

## 2025-08-12 RX ORDER — SODIUM CHLORIDE, SODIUM LACTATE, POTASSIUM CHLORIDE, AND CALCIUM CHLORIDE .6; .31; .03; .02 G/100ML; G/100ML; G/100ML; G/100ML
500 INJECTION, SOLUTION INTRAVENOUS ONCE
Status: COMPLETED | OUTPATIENT
Start: 2025-08-12 | End: 2025-08-12

## 2025-08-12 RX ADMIN — SODIUM CHLORIDE, SODIUM LACTATE, POTASSIUM CHLORIDE, AND CALCIUM CHLORIDE 500 ML: .6; .31; .03; .02 INJECTION, SOLUTION INTRAVENOUS at 15:44

## 2025-08-12 RX ADMIN — SODIUM CHLORIDE, SODIUM LACTATE, POTASSIUM CHLORIDE, AND CALCIUM CHLORIDE: .6; .31; .03; .02 INJECTION, SOLUTION INTRAVENOUS at 16:22

## 2025-09-04 ENCOUNTER — HOSPITAL ENCOUNTER (OUTPATIENT)
Age: 25
Discharge: HOME | End: 2025-09-04
Attending: OBSTETRICS & GYNECOLOGY | Admitting: OBSTETRICS & GYNECOLOGY
Payer: MEDICAID

## 2025-09-04 VITALS
RESPIRATION RATE: 18 BRPM | TEMPERATURE: 99.2 F | SYSTOLIC BLOOD PRESSURE: 106 MMHG | OXYGEN SATURATION: 97 % | HEART RATE: 88 BPM | DIASTOLIC BLOOD PRESSURE: 64 MMHG

## 2025-09-04 PROBLEM — Z3A.25 25 WEEKS GESTATION OF PREGNANCY: Status: ACTIVE | Noted: 2025-09-04

## 2025-09-04 LAB
BACTERIA URNS QL MICRO: ABNORMAL
BILIRUB UR QL STRIP: NEGATIVE
CLARITY UR: CLEAR
COLOR UR: YELLOW
EPI CELLS #/AREA URNS HPF: ABNORMAL /HPF
GLUCOSE UR STRIP-MCNC: NEGATIVE MG/DL
HGB UR QL STRIP.AUTO: NEGATIVE
KETONES UR STRIP-MCNC: NEGATIVE MG/DL
LEUKOCYTE ESTERASE UR QL STRIP: ABNORMAL
NITRITE UR QL STRIP: NEGATIVE
PH UR STRIP: 6.5 [PH] (ref 5–8)
PROT UR STRIP-MCNC: NEGATIVE MG/DL
RBC #/AREA URNS HPF: ABNORMAL /HPF
SP GR UR STRIP: 1.02 (ref 1–1.03)
UROBILINOGEN UR STRIP-ACNC: 0.2 EU/DL (ref 0–1)
WBC #/AREA URNS HPF: ABNORMAL /HPF

## 2025-09-04 PROCEDURE — 6370000000 HC RX 637 (ALT 250 FOR IP): Performed by: OBSTETRICS & GYNECOLOGY

## 2025-09-04 PROCEDURE — 99211 OFF/OP EST MAY X REQ PHY/QHP: CPT

## 2025-09-04 PROCEDURE — 81001 URINALYSIS AUTO W/SCOPE: CPT

## 2025-09-04 RX ORDER — NITROFURANTOIN 25; 75 MG/1; MG/1
100 CAPSULE ORAL EVERY 12 HOURS SCHEDULED
Status: COMPLETED | OUTPATIENT
Start: 2025-09-04 | End: 2025-09-04

## 2025-09-04 RX ORDER — NITROFURANTOIN 25; 75 MG/1; MG/1
100 CAPSULE ORAL EVERY 12 HOURS SCHEDULED
Status: DISCONTINUED | OUTPATIENT
Start: 2025-09-04 | End: 2025-09-04

## 2025-09-04 RX ADMIN — NITROFURANTOIN MONOHYDRATE/MACROCRYSTALS 100 MG: 75; 25 CAPSULE ORAL at 13:22

## (undated) DEVICE — GOWN,SIRUS,POLYRNF,BRTHSLV,XLN/XXL,18/CS: Brand: MEDLINE

## (undated) DEVICE — CESAREAN BIRTH PACK II: Brand: MEDLINE INDUSTRIES, INC.

## (undated) DEVICE — MEDI-VAC YANKAUER SUCTION HANDLE W/BULBOUS TIP: Brand: CARDINAL HEALTH

## (undated) DEVICE — SUTURE VCRL + SZ 3-0 L36IN ABSRB UD L36MM CT-1 1/2 CIR VCP944H

## (undated) DEVICE — GLOVE SURG SZ 75 L12IN FNGR THK83MIL CRM POLYISOPRENE

## (undated) DEVICE — GOWN,SIRUS,FABRNF,L,20/CS: Brand: MEDLINE

## (undated) DEVICE — BARRIER ADH W3XL4IN UTER PELV ABSRB GYNECARE INTCEED

## (undated) DEVICE — COVER,LIGHT HANDLE,FLX,2/PK: Brand: MEDLINE INDUSTRIES, INC.

## (undated) DEVICE — SYSTEM TISS RETEN TRS

## (undated) DEVICE — SKIN AFFIX SURG ADHESIVE 72/CS 0.55ML: Brand: MEDLINE

## (undated) DEVICE — STAPLER SKIN SQ 30 ABSRB STPL DISP INSORB

## (undated) DEVICE — CONTAINER SPEC 64OZ POLYPR PATH SNAP LOK CAP W/ LID

## (undated) DEVICE — SHEET,DRAPE,53X77,STERILE: Brand: MEDLINE

## (undated) DEVICE — TUBING, SUCTION, 3/16" X 12', STRAIGHT: Brand: MEDLINE

## (undated) DEVICE — PEN: MARKING STD 100/CS: Brand: MEDICAL ACTION INDUSTRIES

## (undated) DEVICE — SPONGE LAP W18XL18IN WHT COT 4 PLY FLD STRUNG RADPQ DISP ST

## (undated) DEVICE — SUTURE VCRL + SZ 0 L36IN ABSRB VLT L36MM CT-1 1/2 CIR VCP346H

## (undated) DEVICE — GLOVE SURG SZ 65 L12IN FNGR THK83MIL CRM POLYISOPRENE

## (undated) DEVICE — COUNTER NDL 30 COUNT DBL MAG

## (undated) DEVICE — AGENT HEMSTAT 3GM PURIFIED PLNT STARCH PWD ABSRB ARISTA AH

## (undated) DEVICE — ELECTRODE PT RET AD L9FT HI MOIST COND ADH HYDRGEL CORDED

## (undated) DEVICE — APPLICATOR PREP 26ML 0.7% IOD POVACRYLEX 74% ISO ALC ST

## (undated) DEVICE — 3000CC GUARDIAN II: Brand: GUARDIAN

## (undated) DEVICE — CONTAINER,SPEC,PNEUM TUBE,3OZ,STRL PATH: Brand: MEDLINE

## (undated) DEVICE — PENCIL ES L3M BTTN SWCH HOLSTER W/ BLDE ELECTRD EDGE

## (undated) DEVICE — TUBE BLD COLLECT ST 1 SIL COAT 7ML 10ML

## (undated) DEVICE — BLADE SURG NO20 S STL STR DISP GLASSVAN

## (undated) DEVICE — CATHETERIZATION KIT FOL16 FR 2000 CC DRAINAGE BG LUBRICATH

## (undated) DEVICE — TOWEL,OR,DSP,ST,BLUE,STD,6/PK,12PK/CS: Brand: MEDLINE